# Patient Record
Sex: MALE | Race: WHITE | Employment: OTHER | URBAN - METROPOLITAN AREA
[De-identification: names, ages, dates, MRNs, and addresses within clinical notes are randomized per-mention and may not be internally consistent; named-entity substitution may affect disease eponyms.]

---

## 2017-01-11 ENCOUNTER — OFFICE VISIT (OUTPATIENT)
Dept: CARDIOLOGY CLINIC | Age: 67
End: 2017-01-11

## 2017-01-11 VITALS
DIASTOLIC BLOOD PRESSURE: 66 MMHG | BODY MASS INDEX: 22.76 KG/M2 | HEART RATE: 86 BPM | HEIGHT: 70 IN | SYSTOLIC BLOOD PRESSURE: 138 MMHG | RESPIRATION RATE: 20 BRPM | OXYGEN SATURATION: 97 % | WEIGHT: 159 LBS

## 2017-01-11 DIAGNOSIS — I25.10 CORONARY ARTERY DISEASE INVOLVING NATIVE CORONARY ARTERY OF NATIVE HEART WITHOUT ANGINA PECTORIS: ICD-10-CM

## 2017-01-11 DIAGNOSIS — I48.0 PAF (PAROXYSMAL ATRIAL FIBRILLATION) (HCC): Primary | ICD-10-CM

## 2017-01-11 DIAGNOSIS — E78.5 DYSLIPIDEMIA: ICD-10-CM

## 2017-01-11 NOTE — PATIENT INSTRUCTIONS
[employer] Activation    Thank you for requesting access to [employer]. Please follow the instructions below to securely access and download your online medical record. [employer] allows you to send messages to your doctor, view your test results, renew your prescriptions, schedule appointments, and more. How Do I Sign Up? 1. In your internet browser, go to [email or web address]  2. Click on the First Time User? Click Here link in the Sign In box. You will be redirect to the New Member Sign Up page. 3. Enter your [employer] Access Code exactly as it appears below. You will not need to use this code after youve completed the sign-up process. If you do not sign up before the expiration date, you must request a new code. [employer] Access Code: [email or web address]  Expires: 2017 10:49 AM (This is the date your [employer] access code will )    4. Enter the last four digits of your Social Security Number (xxxx) and Date of Birth (mm/dd/yyyy) as indicated and click Submit. You will be taken to the next sign-up page. 5. Create a [employer] ID. This will be your [employer] login ID and cannot be changed, so think of one that is secure and easy to remember. 6. Create a [employer] password. You can change your password at any time. 7. Enter your Password Reset Question and Answer. This can be used at a later time if you forget your password. 8. Enter your e-mail address. You will receive e-mail notification when new information is available in [address]. 9. Click Sign Up. You can now view and download portions of your medical record. 10. Click the Download Summary menu link to download a portable copy of your medical information. Additional Information    If you have questions, please visit the Frequently Asked Questions section of the [employer] website at [email or web address] Remember, [employer] is NOT to be used for urgent needs. For medical emergencies, dial 911.            Learning About Coronary Artery Disease (CAD)  What is coronary artery disease? Coronary artery disease (CAD) occurs when plaque builds up in the arteries that bring oxygen-rich blood to your heart. Plaque is a fatty substance made of cholesterol, calcium, and other substances in the blood. This process is called hardening of the arteries, or atherosclerosis. What happens when you have coronary artery disease? · Plaque may narrow the coronary arteries. Narrowed arteries cause poor blood flow. This can lead to angina symptoms such as chest pain or discomfort. If blood flow is completely blocked, you could have a heart attack. · You can slow CAD and reduce the risk of future problems by making changes in your lifestyle. These include quitting smoking and eating heart-healthy foods. · Treatments for CAD, along with changes in your lifestyle, can help you live a longer and healthier life. How can you prevent coronary artery disease? · Do not smoke. It may be the best thing you can do to prevent heart disease. If you need help quitting, talk to your doctor about stop-smoking programs and medicines. These can increase your chances of quitting for good. · Be active. Get at least 30 minutes of exercise on most days of the week. Walking is a good choice. You also may want to do other activities, such as running, swimming, cycling, or playing tennis or team sports. · Eat heart-healthy foods. Eat more fruits and vegetables and less foods that contain saturated and trans fats. Limit alcohol, sodium, and sweets. · Stay at a healthy weight. Lose weight if you need to. · Manage other health problems such as diabetes, high blood pressure, and high cholesterol. · Manage stress. Stress can hurt your heart. To keep stress low, talk about your problems and feelings. Don't keep your feelings hidden. · If you have talked about it with your doctor, take a low-dose aspirin every day.  Aspirin can help certain people lower their risk of a heart attack or stroke. But taking aspirin isn't right for everyone, because it can cause serious bleeding. Do not start taking daily aspirin unless your doctor knows about it. How is coronary artery disease treated? · Your doctor will suggest that you make lifestyle changes. For example, your doctor may ask you to eat healthy foods, quit smoking, lose extra weight, and be more active. · You will have to take medicines. · Your doctor may suggest a procedure to open narrowed or blocked arteries. This is called angioplasty. Or your doctor may suggest using healthy blood vessels to create detours around narrowed or blocked arteries. This is called bypass surgery. Follow-up care is a key part of your treatment and safety. Be sure to make and go to all appointments, and call your doctor if you are having problems. It's also a good idea to know your test results and keep a list of the medicines you take. Where can you learn more? Go to http://argentina-hasmukh.info/. Enter [phone] in the search box to learn more about \"Learning About Coronary Artery Disease (CAD). \"  Current as of: January 27, 2016  Content Version: 11.1  © 1748-0085 [employer]. Care instructions adapted under license by [employer] (which disclaims liability or warranty for this information). If you have questions about a medical condition or this instruction, always ask your healthcare professional. [address] any warranty or liability for your use of this information.

## 2017-01-11 NOTE — LETTER
[facility] 
[facility] 
[address], [address] [facility] 
[phone] Date: 1/11/2017 To Whom It May concern: 
 
Jen Greco was seen in our office by Umm Rodriguez.  is cleared to drive and may operate heavy equipment. He has nor restrictions related to driving. Sincerely, Park Galvin LPN

## 2017-01-11 NOTE — PROGRESS NOTES
Subjective:     Problem List  Date Reviewed: 1/11/2017          Codes Class Noted    Dyspepsia ICD-10-CM: R10.13  ICD-9-CM: 536.8  10/27/2015        PAF (paroxysmal atrial fibrillation) ([facility] [address] ICD-10-CM: I48.0  ICD-9-CM: 427.31  3/9/2015        Dyslipidemia ICD-10-CM: E78.5  ICD-9-CM: 272.4  7/25/2011    Overview Addendum 1/6/2016  9:58 AM by Kermit Estrada MD     A. FLP (6/23/11): Tot 150, TG 57, HDL 50, LDL 89 (no Rx). B.  FLP (1/18/12): Tot 154, TG 45, HDL 51, LDL 94 (Zocor 10)  C. FLP (3/6/15): Tot 221, TG 92, HDL 53,  (no Rx). D.  FLP (9/24/15): Tot 123, TG 74, HDL 47, LDL 61 (Lipitor 40). Coronary artery disease ICD-10-CM: I25.10  ICD-9-CM: 414.00  6/22/2011    Overview Addendum 10/2/2015 12:24 PM by Kermit Estrada MD     A. IMI (6/22/11)  B. Cath (6/22/11):  LAD m30; D1 ost90. LCx patent. RCA p30, m100. EF 60% with mild inferior HK. No AVG/MR. C.  PCI mRCA (6/22/11): 3.0x23 Xience. D.  Exercise Cardiolite (10/17/11): Normal perfusion. EF 64%. E.  Cath (3/9/15):  LAD m95; D1 ost90. LCx patent. RCA patent with patent mid stent. EF 50%. No AVG/MR. F.  PCI mLAD (3/9/15): 2.5x23 Xience. G.  Exercise Cardiolite (10/1/15): Normal perfusion, EF 55%. Mr. Rachana Wahl is a 78 y.o. man with the above past medical history, who presents for follow up of his coronary artery disease. Cube Route He is doing very well from a cardiac standpoint. He has been walking a lot and going hunting. The other day he walked about 25 miles while hunting without any significant cardiopulmonary symptoms. He denies any anginal sounding chest pain, chest discomfort, shortness of breath, dyspnea on exertion, orthopnea, paroxysmal nocturnal dyspnea, lower extremity swelling, palpitations, syncope or near syncope. He tells me he is going to see his primary care provider in the near future and will have his lipids checked by her.          History   Smoking Status    Never Smoker Smokeless Tobacco    Never Used       Current Outpatient Prescriptions   Medication Sig Dispense Refill    cyclobenzaprine (FLEXERIL) 5 mg tablet TAKE 1 TABLET BY MOUTH ONCE DAILY AS NEEDED FOR MUSCLE SPASMS 30 Tab 0    atorvastatin (LIPITOR) 40 mg tablet Take 1 Tab by mouth daily. 30 Tab 5    silodosin (RAPAFLO) 8 mg capsule Take 8 mg by mouth every other day.  B.infantis-B.ani-B.long-B.bifi (PROBIOTIC 4X) 10-15 mg TbEC Take  by mouth daily.  co-enzyme Q-10 (CO Q-10) 100 mg capsule Take 100 mg by mouth daily.  albuterol (PROVENTIL HFA, VENTOLIN HFA, PROAIR HFA) 90 mcg/actuation inhaler Take 2 Puffs by inhalation every four (4) hours as needed for Wheezing or Shortness of Breath. 1 Inhaler 2    aspirin 81 mg chewable tablet Take 1 Tab by mouth daily. Objective:     Visit Vitals    /66 (BP 1 Location: Left arm, BP Patient Position: Sitting)    Pulse 86    Resp 20    Ht 5' 10\" (1.778 m)    Wt 159 lb (72.1 kg)    SpO2 97%    BMI 22.81 kg/m2       HEENT Exam:     Normocephalic, atraumatic. EOMI. Oropharynx negative. Neck supple. No lymphadenopathy. Lung Exam:     The patient is not dyspneic. There is no cough. The lungs are clear to percussion. Breath sounds are heard equally in all lung fields. There are no wheezes, rales, rhonchi, or rubs heard on auscultation. Heart Exam:     The rhythm is regular. The PMI is in the 5th intercostal space of the MCL. Apical impulse is normal. S1 is regular. S2 is physiologic. There is no S3, S4 gallop, murmur, click, or rub. Abdomen Exam:     Bowel sounds are normoactive. Abdomen benign. Extremities Exam:     The extremities are atraumatic appearing. There is no clubbing, cyanosis, edema, ulcers, varicose veins, rash, swelling, erythemia noted in the extremities. The neurovascular status is grossly intact with normal distal sensation and pulses.           Vascular Exam:     The radial, brachial, dorsalis pedis, posterior tibial, are equal and strong bilaterally The carotids are equal bilaterally without bruits. EKG: NSR @ 71, no isch. Assessment/Plan:     Mr. Rachana Wahl appears stable from a cardiac standpoint. We are going to stay the course with his current medication regimen, and he is going to follow up with me in one year's time. We discussed diet and exercise. Plan:  1. Continue outpatient medication regimen. 2. Follow up with me in one year's time. 3. Diet and exercise. 4. Call my office, call his primary care physician, or return to the hospital should any concerning symptomatology arise. Mr. Rachana Wahl indicated that he understood this plan and wished to proceed ahead.             Patient Care Team:  Balaji Todd MD as PCP - General (Family Practice)  Alexy Hicks MD (Urology)

## 2017-01-11 NOTE — LETTER
[facility] 
[facility] 
[address], [address] [place] [address] Road 
[phone] Date: 1/11/2017 To Whom It May concern: 
 
Juan Diego Mercer has been seen in our office by [facility].  is able to drive with no restrictions.  
  
 
 
Sincerely, 
 
Sherwin Gimenez LPN

## 2017-03-30 ENCOUNTER — OFFICE VISIT (OUTPATIENT)
Dept: FAMILY MEDICINE CLINIC | Age: 67
End: 2017-03-30

## 2017-03-30 VITALS
RESPIRATION RATE: 16 BRPM | WEIGHT: 156.6 LBS | HEART RATE: 78 BPM | SYSTOLIC BLOOD PRESSURE: 140 MMHG | OXYGEN SATURATION: 99 % | TEMPERATURE: 98 F | BODY MASS INDEX: 22.42 KG/M2 | HEIGHT: 70 IN | DIASTOLIC BLOOD PRESSURE: 81 MMHG

## 2017-03-30 DIAGNOSIS — Z23 ENCOUNTER FOR IMMUNIZATION: ICD-10-CM

## 2017-03-30 DIAGNOSIS — Z12.11 SCREENING FOR MALIGNANT NEOPLASM OF COLON: ICD-10-CM

## 2017-03-30 DIAGNOSIS — Z13.39 SCREENING FOR ALCOHOLISM: ICD-10-CM

## 2017-03-30 DIAGNOSIS — Z00.00 MEDICARE ANNUAL WELLNESS VISIT, INITIAL: Primary | ICD-10-CM

## 2017-03-30 DIAGNOSIS — R53.83 FATIGUE, UNSPECIFIED TYPE: ICD-10-CM

## 2017-03-30 DIAGNOSIS — Z11.59 NEED FOR HEPATITIS C SCREENING TEST: ICD-10-CM

## 2017-03-30 DIAGNOSIS — E78.5 DYSLIPIDEMIA: ICD-10-CM

## 2017-03-30 DIAGNOSIS — Z13.31 SCREENING FOR DEPRESSION: ICD-10-CM

## 2017-03-30 RX ORDER — BISMUTH SUBSALICYLATE 262 MG
1 TABLET,CHEWABLE ORAL DAILY
COMMUNITY
End: 2018-07-10

## 2017-03-30 NOTE — PROGRESS NOTES
Date of visit: 3/30/2017     This is an Initial Medicare Annual Wellness Exam (AWV) (Performed 12 months after IPPE or effective date of Medicare Part B enrollment, Once in a lifetime)    I have reviewed the patient's medical history in detail and updated the computerized patient record. Bob Reed is a 78 y.o. male   History obtained from: spouse and the patient. Concerns today   Hyperlipidemia:   - Significant other reports that he has muscle weakness and wasting which she attributes to his Lipitor. Reports muscle cramping and aching in the calves especially at night. Has discussed with Cardiologist and he needs to be on therapy. He is currently taking a multivitamin that is geared towards those taking statin therapy. - Also with fatigue and decreased libido. Interested in having his testosterone checked. - Due for labs but he is not fasting at this time. Will return. History     Patient Active Problem List   Diagnosis Code    Coronary artery disease I25.10    Dyslipidemia E78.5    PAF (paroxysmal atrial fibrillation) ([facility]) I48.0    Dyspepsia R10.13       Past Medical History:   Diagnosis Date    Atrial fibrillation ([facility] [address] 3/9/2015    Back problem     Dyslipidemia 7/25/2011    Embedded metal fragments     chest    MI (myocardial infarction) ([facility] [address]         Past Surgical History:   Procedure Laterality Date    HX CORONARY STENT PLACEMENT      STRESS TEST CARDIOLITE         No Known Allergies       Current Outpatient Prescriptions   Medication Sig Dispense Refill    multivitamin (ONE A DAY) tablet Take 1 Tab by mouth daily.  cyclobenzaprine (FLEXERIL) 5 mg tablet TAKE 1 TABLET BY MOUTH ONCE DAILY AS NEEDED FOR MUSCLE SPASMS 30 Tab 0    atorvastatin (LIPITOR) 40 mg tablet Take 1 Tab by mouth daily. 30 Tab 5    silodosin (RAPAFLO) 8 mg capsule Take 8 mg by mouth every other day.  B.infantis-B.ani-B.long-B.bifi (PROBIOTIC 4X) 10-15 mg TbEC Take  by mouth daily.       co-enzyme Q-10 (CO Q-10) 100 mg capsule Take 100 mg by mouth daily.  albuterol (PROVENTIL HFA, VENTOLIN HFA, PROAIR HFA) 90 mcg/actuation inhaler Take 2 Puffs by inhalation every four (4) hours as needed for Wheezing or Shortness of Breath. 1 Inhaler 2    aspirin 81 mg chewable tablet Take 1 Tab by mouth daily. Family History   Problem Relation Age of Onset    Heart Disease Mother     Heart Disease Father     Heart Disease Sister     Hypertension Sister     Heart Disease Brother     Asthma Brother      Social History   Substance Use Topics    Smoking status: Never Smoker    Smokeless tobacco: Never Used    Alcohol use No       Specialists/Care Team   Jovi Hopkins has established care with the following healthcare providers:  Patient Care Team:  Mariely Barney MD as PCP - General (Family Practice)  Madison Ennis MD (Urology)  Nancy Rutherford MD as Physician (Cardiology)    Health Risk Assessment     Demographics   male  78 y.o. General Health Questions   -During the past 4 weeks:   -how would you rate your health in general? Good   -how often have you been bothered by feeling dizzy when standing up? never   -how much have you been bothered by bodily pain? \"not unless we go out and over do it\"    -Have you noticed any hearing difficulties? no   -has your physical and emotional health limited your social activities with family or friends? no    Emotional Health Questions     PHQ 2 / 9, over the last two weeks 3/30/2017   Little interest or pleasure in doing things Not at all   Feeling down, depressed or hopeless Not at all   Total Score PHQ 2 0     Health Habits   · Please describe your diet habits: well balanced   · Do you get 5 servings of fruits or vegetables daily? yes  · Do you exercise regularly?  yes    Hearing Loss   · none    Activities of Daily Living and Functional Status   · Do you need help with eating, walking, dressing, bathing, toileting, the phone, transportation, shopping, preparing meals, housework, laundry, medications or managing money? no  · In the past four weeks, was someone available to help you if you needed and wanted help with anything? yes  · Are you confident are you that you can control and manage most of your health problems? yes  · Have you been given information to help you keep track of your medications? yes  · How often do you have trouble taking your medications as prescribed? never     ADL Assessment 3/30/2017   Feeding yourself No Help Needed   Getting from bed to chair No Help Needed   Getting dressed No Help Needed   Bathing or showering No Help Needed   Walk across the room (includes cane/walker) No Help Needed   Using the telphone No Help Needed   Taking your medications No Help Needed   Preparing meals No Help Needed   Managing money (expenses/bills) No Help Needed   Moderately strenuous housework (laundry) No Help Needed   Shopping for personal items (toiletries/medicines) No Help Needed   Shopping for groceries No Help Needed   Driving No Help Needed   Climbing a flight of stairs No Help Needed   Getting to places beyond walking distances No Help Needed       Fall Risk and Home Safety     Fall Risk Assessment, last 12 mths 3/30/2017   Able to walk? Yes   Fall in past 12 months? No     · Does your home have rugs in the hallway, lack grab bars in the bathroom, lack handrails on the stairs or have poor lighting? no  · Do you have smoke detectors and check them regularly? yes  · Do you have difficulties driving a car? no  · Do you always fasten your seat belt when you are in a car? yes     Abuse Screen   · Patient is not abused    Alcohol Risk Factor Screening: On any occasion during the past 3 months, have you had more than 4 drinks containing alcohol? No  · Do you average more than 14 drinks per week?   No    Review of Systems (if indicated for problems addressed today)   None    Physical Examination     Vitals:    03/30/17 1007   BP: 140/81   Pulse: 78 Resp: 16   Temp: 98 °F (36.7 °C)   TempSrc: Oral   SpO2: 99%   Weight: 156 lb 9.6 oz (71 kg)   Height: 5' 10\" (1.778 m)     Body mass index is 22.47 kg/(m^2). Was the patient's timed Up & Go test unsteady or longer than 30 seconds? No    General appearance - alert, well appearing, and in no distress  Eyes - pupils equal and reactive, extraocular eye movements intact, sclera anicteric  Mouth - mucous membranes moist, pharynx normal without lesions  Chest - clear to auscultation, no wheezes, rales or rhonchi, symmetric air entry  Heart - normal rate, regular rhythm, normal S1, S2, no murmurs, rubs, clicks or gallops  Skin - normal coloration and turgor    Evaluation of Cognitive Function   Mood/affect:  happy  Orientation: Person, Place, Time and Situation  Appearance: age appropriate, well dressed and within normal Limits  Family member/caregiver input: None      Advice/Referrals/Counseling (as indicated)   Education and counseling provided:  Pneumococcal Vaccine  Colorectal cancer screening tests  Hepatitis C screening    Preventive Services     (Preventive care checklist to be included in patient instructions)  Discussed today Done Previously Not Needed    x   Pneumococcal vaccines    x  Flu vaccine     x Hepatitis B vaccine (if at risk)    x  Shingles vaccine    x  TDAP vaccine   x   Digital rectal exam   x   PSA   x   Colorectal cancer screening     x Low-dose CT for lung cancer screening     x Bone density test    x  Glaucoma screening     x  Cholesterol test    x  Diabetes screening test      x Diabetes self-management class     x Nutritionist referral for diabetes or renal disease     Discussion of Advance Directive   Discussed with Annelise Lopez his ability to prepare and advance directive in the case that an injury or illness causes him to be unable to make health care decisions. ACP document reviewed and no changes; document is on file.      Assessment/Plan   Annelise Lopez is a 78 y.o. male seen today for Medicare Annual Wellness Visit. 1. Medicare annual wellness visit, initial: ACP document on file reviewed and no changes. 2. Screening for alcoholism: negative. 3. Screening for depression: negative. - Depression Screen Annual    4. Need for hepatitis C screening test: will screen based upon his birth year of 1950.   - HEPATITIS C AB    5. Encounter for immunization: PPSV-23 administered, VIS provided. - Pneumococcal Admin ()  - Pneumococcal Polysaccharide Vaccine    6. Dyslipidemia: he is due for fasting labs which he will return to have done. Continue with statin therapy. - LIPID PANEL  - METABOLIC PANEL, COMPREHENSIVE    7. Fatigue, unspecified type: check labs as below.   - TSH RFX ON ABNORMAL TO FREE T4  - TESTOSTERONE, TOTAL, ADULT MALE    8. Screening for malignant neoplasm of colon: referral to GI for screening colonoscopy provided. Encouraged to schedule appointment. - Referral to Gastroenterology    Follow-up Disposition:  Return in about 1 year (around 3/30/2018) for annual Medicare Wellness Visit or sooner as needed.       Jad Islas MD

## 2017-03-30 NOTE — PATIENT INSTRUCTIONS
The best way to stay healthy is to live a healthy lifestyle. A healthy lifestyle includes regular exercise, eating a well-balanced diet, keeping a healthy weight and not smoking. Regular physical exams and screening tests are another important way to take care of yourself. Preventive exams provided by health care providers can find health problems early when treatment works best and can keep you from getting certain diseases or illnesses. Preventive services include exams, lab tests, screenings, shots, monitoring and information to help you take care of your own health. All people over 65 should have a pneumonia shot. Pneumonia shots are usually only needed once in a lifetime unless your doctor decides differently. In addition to your physical exam, some screening tests are recommended:    All people over 65 should have a yearly flu shot. People over 65 are at medium to high risk for Hepatitis B. Three shots are needed for complete protection. Bone mass measurement (dexa scan) is recommended every two years. Diabetes Mellitus screening is recommended every year. Glaucoma is an eye disease caused by high pressure in the eye. An eye exam is recommended every year. Cardiovascular screening tests that check your cholesterol and other blood fat (lipid) levels are recommended every five years. Colorectal Cancer screening tests help to find pre-cancerous polyps (growths in the colon) so they can be removed before they turn into cancer. Tests ordered for screening depend on your personal and family history risk factors. Prostate Cancer Screening (annually up to age 76)    Screening for breast cancer is recommended yearly with a Mammogram.    Screening for cervical and vaginal cancer is recommended with a pelvic and Pap test every two years.  However if you have had an abnormal pap in the past  three years or at high risk for cervical or vaginal cancer Medicare will cover a pap test and a pelvic exam every year. Here is a list of your current Health Maintenance items with a due date:  Health Maintenance Due   Topic Date Due    Hepatitis C Test  1950   Lab has been ordered     Colonoscopy  11/01/1967    Pneumococcal Vaccine (2 of 2 - PPSV23) 08/19/2016  Done today 3/30/17     Annual Well Visit  01/12/2017  Done today 3/30/17              Pneumococcal Polysaccharide Vaccine: What You Need to Know  Why get vaccinated? Vaccination can protect older adults (and some children and younger adults) from pneumococcal disease. Pneumococcal disease is caused by bacteria that can spread from person to person through close contact. It can cause ear infections, and it can also lead to more serious infections of the:  · Lungs (pneumonia),  · Blood (bacteremia), and  · Covering of the brain and spinal cord (meningitis). Meningitis can cause deafness and brain damage, and it can be fatal.  Anyone can get pneumococcal disease, but children under 3years of age, people with certain medical conditions, adults over 72years of age, and cigarette smokers are at the highest risk. About 18,000 older adults die each year from pneumococcal disease in the [place]. Treatment of pneumococcal infections with penicillin and other drugs used to be more effective. But some strains of the disease have become resistant to these drugs. This makes prevention of the disease, through vaccination, even more important. Pneumococcal polysaccharide vaccine (PPSV23)  Pneumococcal polysaccharide vaccine (PPSV23) protects against 23 types of pneumococcal bacteria. It will not prevent all pneumococcal disease. PPSV23 is recommended for:  · All adults 72years of age and older,  · Anyone 2 through 59years of age with certain long-term health problems,  · Anyone 2 through 59years of age with a weakened immune system,  · Adults 23 through 59years of age who smoke cigarettes or have asthma.   Most people need only one dose of PPSV. A second dose is recommended for certain high-risk groups. People 72 and older should get a dose even if they have gotten one or more doses of the vaccine before they turned 65. Your healthcare provider can give you more information about these recommendations. Most healthy adults develop protection within 2 to 3 weeks of getting the shot. Some people should not get this vaccine  · Anyone who has had a life-threatening allergic reaction to PPSV should not get another dose. · Anyone who has a severe allergy to any component of PPSV should not receive it. Tell your provider if you have any severe allergies. · Anyone who is moderately or severely ill when the shot is scheduled may be asked to wait until they recover before getting the vaccine. Someone with a mild illness can usually be vaccinated. · Children less than 3years of age should not receive this vaccine. · There is no evidence that PPSV is harmful to either a pregnant woman or to her fetus. However, as a precaution, women who need the vaccine should be vaccinated before becoming pregnant, if possible. Risks of a vaccine reaction  With any medicine, including vaccines, there is a chance of side effects. These are usually mild and go away on their own, but serious reactions are also possible. About half of people who get PPSV have mild side effects, such as redness or pain where the shot is given, which go away within about two days. Less than 1 out of 100 people develop a fever, muscle aches, or more severe local reactions. Problems that could happen after any vaccine:  · People sometimes faint after a medical procedure, including vaccination. Sitting or lying down for about 15 minutes can help prevent fainting, and injuries caused by a fall. Tell your doctor if you feel dizzy, or have vision changes or ringing in the ears. · Some people get severe pain in the shoulder and have difficulty moving the arm where a shot was given.  This happens very rarely. · Any medication can cause a severe allergic reaction. Such reactions from a vaccine are very rare, estimated at about 1 in a million doses, and would happen within a few minutes to a few hours after the vaccination. As with any medicine, there is a very remote chance of a vaccine causing a serious injury or death. The safety of vaccines is always being monitored. For more information, visit: www.cdc.gov/vaccinesafety/  What if there is a serious reaction? What should I look for? Look for anything that concerns you, such as signs of a severe allergic reaction, very high fever, or unusual behavior. Signs of a severe allergic reaction can include hives, swelling of the face and throat, difficulty breathing, a fast heartbeat, dizziness, and weakness. These would usually start a few minutes to a few hours after the vaccination. What should I do? If you think it is a severe allergic reaction or other emergency that can't wait, call 9-1-1 or get to the nearest hospital. Otherwise, call your doctor. Afterward, the reaction should be reported to the Vaccine Adverse Event Reporting System (VAERS). Your doctor might file this report, or you can do it yourself through the VAERS web site at www.vaers. [facility].gov, or by calling 2-289.572.2280. [facility] does not give medical advice. How can I learn more? · Ask your doctor. He or she can give you the vaccine package insert or suggest other sources of information. · Call your local or state health department. · Contact the Centers for Disease Control and Prevention (CDC):  ¨ Call 6-559.287.1818 (1-800-CDC-INFO) or  ¨ Visit CDC's website at www.cdc.gov/vaccines  Vaccine Information Statement  PPSV Vaccine  (04/24/2015)  Department of Health and Human Services  Centers for Disease Control and Prevention  Many Vaccine Information Statements are available in Kiswahili and other languages. See www.immunize.org/vis.   [address] disponibles en español y en muchos otros idiomas. [employer]ite [place].si. Care instructions adapted under license by your healthcare professional. If you have questions about a medical condition or this instruction, always ask your healthcare professional. [address] any warranty or liability for your use of this information. A Healthy Lifestyle: Care Instructions  Your Care Instructions  A healthy lifestyle can help you feel good, stay at a healthy weight, and have plenty of energy for both work and play. A healthy lifestyle is something you can share with your whole family. A healthy lifestyle also can lower your risk for serious health problems, such as high blood pressure, heart disease, and diabetes. You can follow a few steps listed below to improve your health and the health of your family. Follow-up care is a key part of your treatment and safety. Be sure to make and go to all appointments, and call your doctor if you are having problems. Its also a good idea to know your test results and keep a list of the medicines you take. How can you care for yourself at home? · Do not eat too much sugar, fat, or fast foods. You can still have dessert and treats now and then. The goal is moderation. · Start small to improve your eating habits. Pay attention to portion sizes, drink less juice and soda pop, and eat more fruits and vegetables. ¨ Eat a healthy amount of food. A 3-ounce serving of meat, for example, is about the size of a deck of cards. Fill the rest of your plate with vegetables and whole grains. ¨ Limit the amount of soda and sports drinks you have every day. Drink more water when you are thirsty. ¨ Eat at least 5 servings of fruits and vegetables every day. It may seem like a lot, but it is not hard to reach this goal. A serving or helping is 1 piece of fruit, 1 cup of vegetables, or 2 cups of leafy, raw vegetables.  Have an apple or some carrot sticks as an afternoon snack instead of a candy bar. Try to have fruits and/or vegetables at every meal.  · Make exercise part of your daily routine. You may want to start with simple activities, such as walking, bicycling, or slow swimming. Try to be active 30 to 60 minutes every day. You do not need to do all 30 to 60 minutes all at once. For example, you can exercise 3 times a day for 10 or 20 minutes. Moderate exercise is safe for most people, but it is always a good idea to talk to your doctor before starting an exercise program.  · Keep moving. Suhail Leap the lawn, work in the garden, or [employer]. Take the stairs instead of the elevator at work. · If you smoke, quit. People who smoke have an increased risk for heart attack, stroke, cancer, and other lung illnesses. Quitting is hard, but there are ways to boost your chance of quitting tobacco for good. ¨ Use nicotine gum, patches, or lozenges. ¨ Ask your doctor about stop-smoking programs and medicines. ¨ Keep trying. In addition to reducing your risk of diseases in the future, you will notice some benefits soon after you stop using tobacco. If you have shortness of breath or asthma symptoms, they will likely get better within a few weeks after you quit. · Limit how much alcohol you drink. Moderate amounts of alcohol (up to 2 drinks a day for men, 1 drink a day for women) are okay. But drinking too much can lead to liver problems, high blood pressure, and other health problems. Family health  If you have a family, there are many things you can do together to improve your health. · Eat meals together as a family as often as possible. · Eat healthy foods. This includes fruits, vegetables, lean meats and dairy, and whole grains. · Include your family in your fitness plan. Most people think of activities such as jogging or tennis as the way to fitness, but there are many ways you and your family can be more active.  Anything that makes you breathe hard and gets your heart pumping is exercise. Here are some tips:  ¨ Walk to do errands or to take your child to school or the bus. ¨ Go for a family bike ride after dinner instead of watching TV. Where can you learn more? Go to http://argentina-hasmukh.info/. Enter U882 in the search box to learn more about \"A Healthy Lifestyle: Care Instructions. \"  Current as of: July 26, 2016  Content Version: 11.2  © 7996-1146 [employer]. Care instructions adapted under license by [employer] (which disclaims liability or warranty for this information). If you have questions about a medical condition or this instruction, always ask your healthcare professional. [address] any warranty or liability for your use of this information.

## 2017-03-30 NOTE — ACP (ADVANCE CARE PLANNING)
Advance Care Planning (ACP) Provider Conversation Snapshot    Date of ACP Conversation: 03/30/17  Persons included in Conversation:  patient and POA  Length of ACP Conversation in minutes:  <16 minutes (Non-Billable)    Authorized Decision Maker (if patient is incapable of making informed decisions): This person is:   Healthcare Agent/Medical Power of  under Advance Directive - [place]           For Patients with Decision Making Capacity:   Values/Goals: Exploration of values, goals, and preferences if recovery is not expected, even with continued medical treatment in the event of:  Imminent death  Severe, permanent brain injury  \"In these circumstances, what matters most to you? \"  Care focused more on comfort or quality of life.     Conversation Outcomes / Follow-Up Plan:   Reviewed existing Advance Directive   Recommended review of completed ACP document annually or upon change in health status

## 2017-03-30 NOTE — PROGRESS NOTES
Chief Complaint   Patient presents with   [facility] Annual Wellness Visit     \"REVIEWED RECORD IN PREPARATION FOR VISIT AND HAVE OBTAINED THE NECESSARY DOCUMENTATION\"  1. Have you been to the ER, urgent care clinic since your last visit? Hospitalized since your last visit? No    2. Have you seen or consulted any other health care providers outside of the [address] [place] [address] since your last visit? Include any pap smears or colon screening.  Patient does have a living will and advanced directives

## 2017-04-01 LAB
ALBUMIN SERPL-MCNC: 4.7 G/DL (ref 3.6–4.8)
ALBUMIN/GLOB SERPL: 2.1 {RATIO} (ref 1.2–2.2)
ALP SERPL-CCNC: 66 IU/L (ref 39–117)
ALT SERPL-CCNC: 23 IU/L (ref 0–44)
AST SERPL-CCNC: 20 IU/L (ref 0–40)
BILIRUB SERPL-MCNC: 0.5 MG/DL (ref 0–1.2)
BUN SERPL-MCNC: 17 MG/DL (ref 8–27)
BUN/CREAT SERPL: 16 (ref 10–22)
CALCIUM SERPL-MCNC: 9.5 MG/DL (ref 8.6–10.2)
CHLORIDE SERPL-SCNC: 98 MMOL/L (ref 96–106)
CHOLEST SERPL-MCNC: 192 MG/DL (ref 100–199)
CO2 SERPL-SCNC: 26 MMOL/L (ref 18–29)
COMMENT: NORMAL
CREAT SERPL-MCNC: 1.07 MG/DL (ref 0.76–1.27)
GLOBULIN SER CALC-MCNC: 2.2 G/DL (ref 1.5–4.5)
GLUCOSE SERPL-MCNC: 101 MG/DL (ref 65–99)
HCV AB S/CO SERPL IA: <0.1 S/CO RATIO (ref 0–0.9)
HDLC SERPL-MCNC: 63 MG/DL
INTERPRETATION, 910389: NORMAL
LDLC SERPL CALC-MCNC: 114 MG/DL (ref 0–99)
POTASSIUM SERPL-SCNC: 4.7 MMOL/L (ref 3.5–5.2)
PROT SERPL-MCNC: 6.9 G/DL (ref 6–8.5)
SODIUM SERPL-SCNC: 142 MMOL/L (ref 134–144)
TESTOST SERPL-MCNC: 570 NG/DL (ref 348–1197)
TRIGL SERPL-MCNC: 76 MG/DL (ref 0–149)
TSH SERPL DL<=0.005 MIU/L-ACNC: 3.78 UIU/ML (ref 0.45–4.5)
VLDLC SERPL CALC-MCNC: 15 MG/DL (ref 5–40)

## 2017-04-07 ENCOUNTER — TELEPHONE (OUTPATIENT)
Dept: FAMILY MEDICINE CLINIC | Age: 67
End: 2017-04-07

## 2017-04-12 NOTE — TELEPHONE ENCOUNTER
Patient called and VM message left asking that he return my phone call. Labs:   - Testosterone level is normal.   - Thyroid function is normal.   - Electrolytes including kidney and liver function normal. Mildly elevated blood glucose level at 101. - Lipid panel notable for mild increase in LDL level from previous (61 --> 114). He should continue with his statin therapy. - Hepatitis C screening is negative.

## 2017-04-26 ENCOUNTER — HOSPITAL ENCOUNTER (OUTPATIENT)
Dept: GENERAL RADIOLOGY | Age: 67
Discharge: HOME | End: 2017-04-26
Attending: PODIATRIST
Payer: MEDICARE

## 2017-04-26 DIAGNOSIS — M20.41 ACQUIRED HAMMER TOE OF RIGHT FOOT: ICD-10-CM

## 2017-04-26 DIAGNOSIS — M20.42 HAMMER TOE OF LEFT FOOT: ICD-10-CM

## 2017-04-26 PROCEDURE — 73630 X-RAY EXAM OF FOOT: CPT

## 2017-04-28 NOTE — H&P
.     History and Physical    Patient: Baljinder Claire MRN: [id number]  SSN: [id number]    YOB: 1950  Age: 78 y.o. Sex: male      Subjective:      Baljinder Claire is a 78 y.o. male with a  Past medical history significant for atrial fibrillation and myocardial infarctions, comes to my private office with a chief complain of pain and discomfort to the right 4th interspace. Patient has has lesion inbetween his 4th interspace for approximatly 5 years. Patient has tried all conservative method to relieve the pain however with minimal to no improvement. Patient now has an ulcer between his digits from the extensive rubbing and removal of the corn. Past Medical History:   Diagnosis Date    Atrial fibrillation ([facility] [address] 3/9/2015    Back problem     Dyslipidemia 7/25/2011    Embedded metal fragments     chest    MI (myocardial infarction) ([facility] [address]      Past Surgical History:   Procedure Laterality Date    HX CORONARY STENT PLACEMENT      STRESS TEST CARDIOLITE        Family History   Problem Relation Age of Onset    Heart Disease Mother     Heart Disease Father     Heart Disease Sister     Hypertension Sister     Heart Disease Brother     Asthma Brother      Social History   Substance Use Topics    Smoking status: Never Smoker    Smokeless tobacco: Never Used    Alcohol use No      Prior to Admission medications    Medication Sig Start Date End Date Taking? Authorizing Provider   multivitamin (ONE A DAY) tablet Take 1 Tab by mouth daily. Historical Provider   cyclobenzaprine (FLEXERIL) 5 mg tablet TAKE 1 TABLET BY MOUTH ONCE DAILY AS NEEDED FOR MUSCLE SPASMS 12/14/16   Casimiro Pompa MD   atorvastatin (LIPITOR) 40 mg tablet Take 1 Tab by mouth daily. 11/10/16   Rachel Krishnan MD   silodosin (RAPAFLO) 8 mg capsule Take 8 mg by mouth every other day. Historical Provider   B.infantis-B.ani-B.long-B.bifi (PROBIOTIC 4X) 10-15 mg TbEC Take  by mouth daily.     Historical Provider   co-enzyme Q-10 (CO Q-10) 100 mg capsule Take 100 mg by mouth daily. Historical Provider   albuterol (PROVENTIL HFA, VENTOLIN HFA, PROAIR HFA) 90 mcg/actuation inhaler Take 2 Puffs by inhalation every four (4) hours as needed for Wheezing or Shortness of Breath. 10/27/15   Lencho Stephens MD   aspirin 81 mg chewable tablet Take 1 Tab by mouth daily. 3/10/15   Danna Kearney MD        No Known Allergies    Review of Systems:  A comprehensive review of systems was negative except for that written in the History of Present Illness. Objective:       Physical Exam:  GENERAL: alert, cooperative, no distress  LYMPHATIC: Cervical, supraclavicular, and axillary nodes normal.   THROAT & NECK: normal and no erythema or exudates noted. LUNG: clear to auscultation bilaterally  HEART: regular rate and rhythm, S1, S2 normal, no murmur, click, rub or gallop  ABDOMEN: soft, non-tender. Bowel sounds normal. No masses,  no organomegaly  EXTREMITIES:  extremities normal, atraumatic, no cyanosis or edema  SKIN: lesion noted on both toes in the left and right 4th interspaces, right worse than left, macerationed hyperkeratotic lesion with thick core. PSYCHIATRIC: non focal    Assessment:     Hospital Problems  Date Reviewed: 3/30/2017    None          Plan:     78year old male with a PMH significant for afib and MI's, has a painful listers corn on his right foot which has progressed to ulcerations    1. Pt examined and evaluated  2. All conservative measures including padding, strapping, wider shoe gear have been exhausted. Patient understands if nothing is done lesion can progress to a bone infection. 3.  Pt scheduled for surgery next Wednesday for right 5th digit arthroplasty and right 4th metatarsal and proximal phalanx exostectomy. 4. All alternatives risks and complications discussed with patient.     5. P    Signed By: Mandy Betancourt DPM     April 28, 2017

## 2017-05-01 ENCOUNTER — HOSPITAL ENCOUNTER (OUTPATIENT)
Dept: PREADMISSION TESTING | Age: 67
Discharge: HOME | End: 2017-05-01

## 2017-05-01 VITALS
WEIGHT: 154.3 LBS | SYSTOLIC BLOOD PRESSURE: 150 MMHG | BODY MASS INDEX: 22.09 KG/M2 | TEMPERATURE: 98.5 F | RESPIRATION RATE: 16 BRPM | DIASTOLIC BLOOD PRESSURE: 75 MMHG | HEART RATE: 68 BPM | OXYGEN SATURATION: 98 % | HEIGHT: 70 IN

## 2017-05-01 RX ORDER — ACETAMINOPHEN 160 MG/5ML
200 SUSPENSION, ORAL (FINAL DOSE FORM) ORAL DAILY
COMMUNITY
End: 2018-07-10

## 2017-05-01 NOTE — PERIOP NOTES
[facility]  PREOPERATIVE INSTRUCTIONS    Surgery Date:  5/3/2017  Surgery arrival time given by surgeon: NO   If no,[facility] staff will call you between 4 PM- 8 PM the day before surgery with your arrival time. If your surgery is on a Monday, we will call you the preceding Friday. Please call [phone] after 8 PM if you did not receive your arrival time. 1. Please report at the designated time to the 2[address] [facility]. Bring your insurance card, photo identification, and any copayment ( if applicable). 2. You must have a responsible adult to drive you home. You need to have a responsible adult to stay with you the first 24 hours after surgery if you are going home the same day of your surgery and you should not drive a car for 24 hours following your surgery. 3. Nothing to eat or drink after midnight the night before surgery. This includes no water, gum, mints, coffee, juice, etc.  Please note special instructions, if applicable, below for medications. 4. MEDICATIONS TO TAKE THE MORNING OF SURGERY WITH A SIP OF WATER: None  5. You MAY take your pain medication the day of surgery with a sip of water. 6. No alcoholic beverages 24 hours before or after your surgery. 7. If you are being admitted to the hospital,please leave personal belongings/luggage in your car until you have an assigned hospital room number. 8. Stop Aspirin and/or any non-steroidal anti-inflammatory drugs (i.e. Ibuprofen, Naproxen, Advil, Aleve) as directed by your prescribing physician. You may take Tylenol. Stop herbal supplements 1 week prior to  surgery. 9. If you are currently taking Plavix, Coumadin,or any other blood-thinning/anticoagulant medication contact your prescribing physician for instructions. 10. Please wear comfortable clothes. Wear your glasses instead of contacts. We ask that all money, jewelry and valuables be left at home. Wear no make up, particularly mascara, the day of surgery.    11.  All body piercings, rings,and jewelry need to be removed and left at home. Please wear your hair loose or down. Please no pony-tails, buns, or any metal hair accessories. If you shower the morning of surgery, please do not apply any lotions, powders, or deodorants afterwards. Do not shave any body area within 24 hours of your surgery. 12. Please follow all instructions to avoid any potential surgical cancellation. 13. Should your physical condition change, (i.e. fever, cold, flu, etc.) please notify your surgeon as soon as possible. 14. It is important to be on time. If a situation occurs where you may be delayed, please call:  [phone] on the day of surgery. 15. The Preadmission Testing staff can be reached at [phone]. Aurelia [address] 16. Special instructions:   1. Please bring your completed medication sheet with you the day of surgery. Please update any changes in medications. 2. Free  parking  The patient was contacted  in person. He  demonstrate  understanding of all instructions does not  need reinforcement.

## 2017-05-03 ENCOUNTER — HOSPITAL ENCOUNTER (OUTPATIENT)
Age: 67
Setting detail: OUTPATIENT SURGERY
Discharge: HOME | End: 2017-05-03
Attending: PODIATRIST | Admitting: PODIATRIST
Payer: MEDICARE

## 2017-05-03 ENCOUNTER — APPOINTMENT (OUTPATIENT)
Dept: GENERAL RADIOLOGY | Age: 67
End: 2017-05-03
Attending: PODIATRIST
Payer: MEDICARE

## 2017-05-03 ENCOUNTER — ANESTHESIA (OUTPATIENT)
Dept: SURGERY | Age: 67
End: 2017-05-03
Payer: MEDICARE

## 2017-05-03 ENCOUNTER — ANESTHESIA EVENT (OUTPATIENT)
Dept: SURGERY | Age: 67
End: 2017-05-03
Payer: MEDICARE

## 2017-05-03 VITALS
SYSTOLIC BLOOD PRESSURE: 160 MMHG | HEART RATE: 61 BPM | TEMPERATURE: 97.5 F | OXYGEN SATURATION: 100 % | RESPIRATION RATE: 14 BRPM | DIASTOLIC BLOOD PRESSURE: 90 MMHG

## 2017-05-03 PROCEDURE — 76010000149 HC OR TIME 1 TO 1.5 HR: Performed by: PODIATRIST

## 2017-05-03 PROCEDURE — 77030006773 HC BLD SAW OSC BRSM -A: Performed by: PODIATRIST

## 2017-05-03 PROCEDURE — 74011250636 HC RX REV CODE- 250/636

## 2017-05-03 PROCEDURE — 77030002986 HC SUT PROL J&J -A: Performed by: PODIATRIST

## 2017-05-03 PROCEDURE — 74011000250 HC RX REV CODE- 250: Performed by: PODIATRIST

## 2017-05-03 PROCEDURE — 77030036687 HC SHOE PSTOP S2SG -A

## 2017-05-03 PROCEDURE — 74011250636 HC RX REV CODE- 250/636: Performed by: PODIATRIST

## 2017-05-03 PROCEDURE — 77030018836 HC SOL IRR NACL ICUM -A: Performed by: PODIATRIST

## 2017-05-03 PROCEDURE — 77030002933 HC SUT MCRYL J&J -A: Performed by: PODIATRIST

## 2017-05-03 PROCEDURE — 73620 X-RAY EXAM OF FOOT: CPT

## 2017-05-03 PROCEDURE — 76060000033 HC ANESTHESIA 1 TO 1.5 HR: Performed by: PODIATRIST

## 2017-05-03 PROCEDURE — 74011000250 HC RX REV CODE- 250

## 2017-05-03 PROCEDURE — 76210000026 HC REC RM PH II 1 TO 1.5 HR: Performed by: PODIATRIST

## 2017-05-03 PROCEDURE — 77030020782 HC GWN BAIR PAWS FLX 3M -B

## 2017-05-03 RX ORDER — SODIUM CHLORIDE, SODIUM LACTATE, POTASSIUM CHLORIDE, CALCIUM CHLORIDE 600; 310; 30; 20 MG/100ML; MG/100ML; MG/100ML; MG/100ML
INJECTION, SOLUTION INTRAVENOUS
Status: DISCONTINUED | OUTPATIENT
Start: 2017-05-03 | End: 2017-05-03 | Stop reason: HOSPADM

## 2017-05-03 RX ORDER — BUPIVACAINE HYDROCHLORIDE 5 MG/ML
INJECTION, SOLUTION EPIDURAL; INTRACAUDAL AS NEEDED
Status: DISCONTINUED | OUTPATIENT
Start: 2017-05-03 | End: 2017-05-03 | Stop reason: HOSPADM

## 2017-05-03 RX ORDER — GLYCOPYRROLATE 0.2 MG/ML
INJECTION INTRAMUSCULAR; INTRAVENOUS AS NEEDED
Status: DISCONTINUED | OUTPATIENT
Start: 2017-05-03 | End: 2017-05-03 | Stop reason: HOSPADM

## 2017-05-03 RX ORDER — PROPOFOL 10 MG/ML
INJECTION, EMULSION INTRAVENOUS
Status: DISCONTINUED | OUTPATIENT
Start: 2017-05-03 | End: 2017-05-03 | Stop reason: HOSPADM

## 2017-05-03 RX ORDER — SODIUM CHLORIDE 0.9 % (FLUSH) 0.9 %
5-10 SYRINGE (ML) INJECTION EVERY 8 HOURS
Status: DISCONTINUED | OUTPATIENT
Start: 2017-05-03 | End: 2017-05-03 | Stop reason: HOSPADM

## 2017-05-03 RX ORDER — HYDROMORPHONE HYDROCHLORIDE 1 MG/ML
1 INJECTION, SOLUTION INTRAMUSCULAR; INTRAVENOUS; SUBCUTANEOUS
Status: DISCONTINUED | OUTPATIENT
Start: 2017-05-03 | End: 2017-05-03 | Stop reason: HOSPADM

## 2017-05-03 RX ORDER — MIDAZOLAM HYDROCHLORIDE 1 MG/ML
INJECTION, SOLUTION INTRAMUSCULAR; INTRAVENOUS AS NEEDED
Status: DISCONTINUED | OUTPATIENT
Start: 2017-05-03 | End: 2017-05-03 | Stop reason: HOSPADM

## 2017-05-03 RX ORDER — SODIUM CHLORIDE 0.9 % (FLUSH) 0.9 %
5-10 SYRINGE (ML) INJECTION AS NEEDED
Status: DISCONTINUED | OUTPATIENT
Start: 2017-05-03 | End: 2017-05-03 | Stop reason: HOSPADM

## 2017-05-03 RX ORDER — DEXAMETHASONE SODIUM PHOSPHATE 4 MG/ML
INJECTION, SOLUTION INTRA-ARTICULAR; INTRALESIONAL; INTRAMUSCULAR; INTRAVENOUS; SOFT TISSUE AS NEEDED
Status: DISCONTINUED | OUTPATIENT
Start: 2017-05-03 | End: 2017-05-03 | Stop reason: HOSPADM

## 2017-05-03 RX ORDER — POVIDONE-IODINE 10 %
SOLUTION, NON-ORAL TOPICAL AS NEEDED
Status: DISCONTINUED | OUTPATIENT
Start: 2017-05-03 | End: 2017-05-03 | Stop reason: HOSPADM

## 2017-05-03 RX ORDER — ACETAMINOPHEN 325 MG/1
650 TABLET ORAL
Status: DISCONTINUED | OUTPATIENT
Start: 2017-05-03 | End: 2017-05-03 | Stop reason: HOSPADM

## 2017-05-03 RX ORDER — PROPOFOL 10 MG/ML
INJECTION, EMULSION INTRAVENOUS AS NEEDED
Status: DISCONTINUED | OUTPATIENT
Start: 2017-05-03 | End: 2017-05-03 | Stop reason: HOSPADM

## 2017-05-03 RX ORDER — CEFAZOLIN SODIUM IN 0.9 % NACL 2 G/50 ML
2 INTRAVENOUS SOLUTION, PIGGYBACK (ML) INTRAVENOUS ONCE
Status: COMPLETED | OUTPATIENT
Start: 2017-05-03 | End: 2017-05-03

## 2017-05-03 RX ORDER — FENTANYL CITRATE 50 UG/ML
INJECTION, SOLUTION INTRAMUSCULAR; INTRAVENOUS AS NEEDED
Status: DISCONTINUED | OUTPATIENT
Start: 2017-05-03 | End: 2017-05-03 | Stop reason: HOSPADM

## 2017-05-03 RX ORDER — HYDROCODONE BITARTRATE AND ACETAMINOPHEN 5; 325 MG/1; MG/1
1 TABLET ORAL
Status: DISCONTINUED | OUTPATIENT
Start: 2017-05-03 | End: 2017-05-03 | Stop reason: HOSPADM

## 2017-05-03 RX ADMIN — PROPOFOL 30 MG: 10 INJECTION, EMULSION INTRAVENOUS at 12:59

## 2017-05-03 RX ADMIN — FENTANYL CITRATE 12.5 MCG: 50 INJECTION, SOLUTION INTRAMUSCULAR; INTRAVENOUS at 12:51

## 2017-05-03 RX ADMIN — CEFAZOLIN 2 G: 1 INJECTION, POWDER, FOR SOLUTION INTRAMUSCULAR; INTRAVENOUS; PARENTERAL at 12:59

## 2017-05-03 RX ADMIN — SODIUM CHLORIDE, SODIUM LACTATE, POTASSIUM CHLORIDE, CALCIUM CHLORIDE: 600; 310; 30; 20 INJECTION, SOLUTION INTRAVENOUS at 12:30

## 2017-05-03 RX ADMIN — MIDAZOLAM HYDROCHLORIDE 2 MG: 1 INJECTION, SOLUTION INTRAMUSCULAR; INTRAVENOUS at 12:51

## 2017-05-03 RX ADMIN — GLYCOPYRROLATE 0.1 MG: 0.2 INJECTION INTRAMUSCULAR; INTRAVENOUS at 13:09

## 2017-05-03 RX ADMIN — PROPOFOL 50 MCG/KG/MIN: 10 INJECTION, EMULSION INTRAVENOUS at 12:59

## 2017-05-03 RX ADMIN — SODIUM CHLORIDE, SODIUM LACTATE, POTASSIUM CHLORIDE, CALCIUM CHLORIDE: 600; 310; 30; 20 INJECTION, SOLUTION INTRAVENOUS at 13:36

## 2017-05-03 RX ADMIN — FENTANYL CITRATE 12.5 MCG: 50 INJECTION, SOLUTION INTRAMUSCULAR; INTRAVENOUS at 12:53

## 2017-05-03 RX ADMIN — GLYCOPYRROLATE 0.1 MG: 0.2 INJECTION INTRAMUSCULAR; INTRAVENOUS at 13:13

## 2017-05-03 NOTE — IP AVS SNAPSHOT
303 [facility] 104 1007 [facility] 
[phone] Patient: Benigno Vásquez MRN: [id number] HEL:[id number] You are allergic to the following No active allergies Recent Documentation Smoking Status Never Smoker Emergency Contacts Name Discharge Info Relation Home Work Mobile Mahogany Ponce  Other Relative [6] [phone] About your hospitalization You were admitted on:  May 3, 2017 You last received care in the:  [facility] PACU You were discharged on:  May 3, 2017 Unit phone number:  [phone] Why you were hospitalized Your primary diagnosis was:  Not on File Providers Seen During Your Hospitalizations Provider Role Specialty Primary office phone Rommel Nguyen DPM Attending Provider Podiatry [phone] Your Primary Care Physician (PCP) Primary Care Physician Office Phone Office Fax Bubba Hernandez [phone] Follow-up Information Follow up With Details Comments Contact Info Brenda Cardoza MD   [address] [facility] [address] [place] [address] 
[phone] Current Discharge Medication List  
  
ASK your doctor about these medications Dose & Instructions Dispensing Information Comments Morning Noon Evening Bedtime  
 albuterol 90 mcg/actuation inhaler Commonly known as:  PROVENTIL HFA, VENTOLIN HFA, PROAIR HFA Your last dose was: Your next dose is:    
   
   
 Dose:  2 Puff Take 2 Puffs by inhalation every four (4) hours as needed for Wheezing or Shortness of Breath. Quantity:  1 Inhaler Refills:  2  
     
   
   
   
  
 aspirin 81 mg chewable tablet Your last dose was: Your next dose is:    
   
   
 Dose:  81 mg Take 1 Tab by mouth daily. Refills:  0 atorvastatin 40 mg tablet Commonly known as:  LIPITOR Your last dose was: Your next dose is:    
   
   
 Dose:  40 mg Take 1 Tab by mouth daily. Quantity:  30 Tab Refills:  5  
     
   
   
   
  
 coenzyme Q-10 200 mg capsule Commonly known as:  CO Q-10 Your last dose was: Your next dose is:    
   
   
 Dose:  200 mg Take 200 mg by mouth daily. Refills:  0  
     
   
   
   
  
 cyclobenzaprine 5 mg tablet Commonly known as:  FLEXERIL Your last dose was: Your next dose is: TAKE 1 TABLET BY MOUTH ONCE DAILY AS NEEDED FOR MUSCLE SPASMS Quantity:  30 Tab Refills:  0 LECITHIN PO Your last dose was: Your next dose is: Take  by mouth daily. Refills:  0  
     
   
   
   
  
 multivitamin tablet Commonly known as:  ONE A DAY Your last dose was: Your next dose is:    
   
   
 Dose:  1 Tab Take 1 Tab by mouth daily. Refills:  0  
     
   
   
   
  
 OTHER Your last dose was: Your next dose is:    
   
   
 Dose:  2 Tab  
2 Tabs daily. Oxyflex Refills:  0 PROBIOTIC 4X 10-15 mg Tbec Generic drug:  B.infantis-B.ani-B.long-B.bifi Your last dose was: Your next dose is: Take  by mouth daily. Refills:  0  
     
   
   
   
  
 silodosin 8 mg capsule Commonly known as:  RAPAFLO Your last dose was: Your next dose is:    
   
   
 Dose:  8 mg Take 8 mg by mouth every other day. Refills:  0 Discharge Instructions [facility], [address] P:   F: [phone] Podiatric Surgery Post-Operative Instructions 1. Foot surgery is more inconvenient than painful.   Although it is normal to have some pain after surgery, you may be surprised at how well you actually feel. In spite of this, it is imperative that you follow the instructions carefully and prepare yourself and your family for an extended period of rest and convalescence. 2. Numbness in the feet will wear off after approximately five to twelve hours (this will vary patient to patient). 3. Start taking your pain medication immediately upon returning home. First take the anti-inflammatory medicine with food or milk. Take the anti-inflammatory even if you experience no pain. The painkiller is only for pain not relieved by the anti-inflammatory and should be taken only if needed. Stop the medication if you develop any abnormal rash, stomach pains, or unusual sensation and call the office. The following medications have been prescribed to you: 
 
Rx pain killer: Percocet 5/325 one tab by mouth every 4 hrs as needed for pain Rx antibiotic: Keflex 500 mg one tab by mouth twice daily for 10 days Rx NSAID: Motrin 800mg one tab by mouth three time a day 4. Black and blue toes or black and blue in areas un-bandaged are a normal occurrence and resolves unremarkably after surgery. 5. The bandage must be kept completely dry after surgery. Notify the doctor if your bandage becomes wet. 6. Your feet should be elevated above your hip at all times. DO not sit with the foot on a hassock or chair. When traveling, sit in the back seat with the foot elevated on pillows. You should sleep with the surgical shoe on for the first three weeks post-operatively or until notified by your doctor that it is safe to be removed while sleeping. 7. Ice may be indicated for your surgery, you should put a cold pack behind the knee of the operative leg, 20 minutes on, 20 minutes off, especially in the first 1-2 days post op. This will reduce pain and swelling and aid in a faster recovery. Instructions for Walking After Foot Surgery 1. Weightbearing status: full weight bearing as tolerated in surgical shoe 2. Most patients can walk with only the surgical shoe after foot surgery (canes and walkers may be needed for additional stability; this will be determined by the doctor or nurses post-operatively). The following are important rules regarding walking after foot surgery for the first one to three weeks: You may walk or stand for bathroom or meals only. No prolonged standing or walking. No public places. Extremely limited use of stairs. Try to stay on one level of your home as long as possible during the day. General rule; 50 minutes off foot with limb elevated, 10 minutes on foot (maximum) for meals and bathroom only. When climbing stairs carefully advance one foot at a time (flatfooted) with assistance of a person and the banister or on your buttocks. Walking With the Surgical Shoe The shoe should be snuggly applied as to avoid movement or shifting while attempting to stand. Please sleep with the shoe on as stated unless otherwise directed by your doctor. While walking, the right foot should be turned out at approximately 2 oclock. Left foot should be turned outward at approximately 10 oclock. Walk with the surgical foot rotated outward as shown. DO not pull toes up in the air by walking on your heel. Keep your foot flat and literally drag it along slightly off the ground surface. Do not push off or bend the front of your foot while walking (no propulsion). Wear a shoe or sneaker of comparable heel height on the non-surgical foot. SIMPLY WALK ON THE FLAT OF YOUR FOOT WITH THE WEIGHT SHIFTED BACKWARDS TOWARDS THE HEEL AND THE BIG TOE TURNED OUTWARD. If you have additional questions or need assistance please call our office for emergency service 24 hours a day. Do not initiate any action or remove the bandages unless directed by your doctor.   If you notice any excessive bleeding or have pain not relieved by the prescribed medications, please call the emergency number without hesitation. DISCHARGE SUMMARY from your Nurse The following personal items collected during your admission are returned to you:  
Dental Appliance: Dental Appliances: None Vision: Visual Aid: None Hearing Aid:   
Jewelry: Jewelry: None Clothing: Clothing: Footwear, Hat, Jacket/Coat, Pants, [place], [employer], Undergarments Other Valuables: Other Valuables: None Valuables sent to safe:   
 
PATIENT INSTRUCTIONS: 
 
After general anesthesia or intravenous sedation, for 24 hours or while taking prescription Narcotics: · Limit your activities · Do not drive and operate hazardous machinery · Do not make important personal or business decisions · Do  not drink alcoholic beverages · If you have not urinated within 8 hours after discharge, please contact your surgeon on call. Report the following to your surgeon: 
· Excessive pain, swelling, redness or odor of or around the surgical area · Temperature over 100.5 · Nausea and vomiting lasting longer than 4 hours or if unable to take medications · Any signs of decreased circulation or nerve impairment to extremity: change in color, persistent  numbness, tingling, coldness or increase pain · Any questions [address] Breathing deep and coughing are very important exercises to do after surgery. Deep breathing and coughing open the little air tubes and air sacks in your lungs. You take deep breaths every day. You may not even notice - it is just something you do when you sigh or yawn. It is a natural exercise you do to keep these air passages open. After surgery, take deep breaths and cough, on purpose. Coughing and deep breathing help prevent bronchitis and pneumonia after surgery. If you had chest or belly surgery, use a pillow as a \"hug buddy\" and hold it tightly to your chest or belly when you cough. DIRECTIONS: 
6. Take 10 to 15 slow deep breaths every hour while awake. 7. Breathe in deeply, and hold it for 2 seconds. 8. Exhale slowly through puckered lips, like blowing up a balloon. 9. After every 4th or 5th deep breath, hug your pillow to your chest or belly and give a hard, deep cough. Yes, it will probably hurt. But doing this exercise is very important part of healing after surgery. Take your pain medicine to help you do this exercise without too much pain. IF YOU HAVE BEEN DIAGNOSED WITH SLEEP APNEA, PLEASE USE YOUR SLEEIFP APNEA DEVICE OR CPAP MACHINE WHEN YOU INTEND TO NAP AFTER TAKING PAIN MEDICATION. Ankle Pumps Ankle pumps increase the circulation of oxygenated blood to your lower extremities and decrease your risk for circulation problems such as blood clots. They also stretch the muscles, tendons and ligaments in your foot and ankle, and prevent joint contracture in the ankle and foot, especially after surgeries on the legs. It is important to do ankle pump exercises regularly after surgery because immobility increases your risk for developing a blood clot. Your doctor may also have you take an Aspirin for the next few days as well. If your doctor did not ask you to take an Aspirin, consult with him before starting Aspirin therapy on your own. Slowly point your foot forward, feeling the muscles on the top of your lower leg stretch, and hold this position for 5 seconds. Next, pull your foot back toward you as far as possible, stretching the calf muscles, and hold that position for 5 seconds. Repeat with the other foot. Perform 10 repetitions every hour while awake for both ankles if possible (down and then up with the foot once is one repetition). You should feel gentle stretching of the muscles in your lower leg when doing this exercise.   If you feel pain, or your range of motion is limited, don't  Push too hard. Only go the limit your joint and muscles will let you go. If you have increasing pain, progressively worsening leg warmth or swelling, STOP the exercise and call your doctor. Below is information about the medications your doctor is prescribing after your visit: 
 
Other information in your discharge envelope: PRESCRIPTIONS PHYSICAL THERAPY PRESCRIPTION 
     APPOINTMENT CARDS [facility] Pamphlet for block or block with On-Q Catheter from Anesthesia Service Medical device information sheets/pamphlets from their  School/work excuse note. /parent work excuse note. These are general instructions for a healthy lifestyle: *  Please give a list of your current medications to your Primary Care Provider. *  Please update this list whenever your medications are discontinued, doses are 
    changed, or new medications (including over-the-counter products) are added. *  Please carry medication information at all times in case of emergency situations. About Smoking No smoking / No tobacco products / Avoid exposure to second hand smoke Surgeon General's Warning:  Quitting smoking now greatly reduces serious risk to your health. Obesity, smoking, and sedentary lifestyle greatly increases your risk for illness and disease. A healthy diet, regular physical exercise & weight monitoring are important for maintaining a healthy lifestyle. Congestive Heart Failure You may be retaining fluid if you have a history of heart failure or if you experience any of the following symptoms:  Weight gain of 3 pounds or more overnight or 5 pounds in a week, increased swelling in our hands or feet or shortness of breath while lying flat in bed. Please call your doctor as soon as you notice any of these symptoms; do not wait until your next office visit. Recognize signs and symptoms of STROKE: 
F - face looks uneven A - arms unable to move or move even S - speech slurred or non-existent T - time-call 911 as soon as signs and symptoms begin-DO NOT go Back to bed or wait to see if you get better-TIME IS BRAIN. Warning signs of HEART ATTACK Call 911 if you have these symptoms · Chest discomfort. Most heart attacks involve discomfort in the center of the chest that lasts more than a few minutes, or that goes away and comes back. It can feel like uncomfortable pressure, squeezing, fullness, or pain. · Discomfort in other areas of the upper body. Symptoms can include pain or discomfort in one or both Arms, the back, neck, jaw, or stomach. ·  Shortness of breath with or without chest discomfort. · Other signs may include breaking out in a cold sweat, nausea, or lightheadedness Don't wait more than five minutes to call 911 - MINUTES MATTER! Fast action can save your life. Calling 911 is almost always the fastest way to get lifesaving treatment. Emergency Medical Services staff can begin treatment when they arrive - up to an hour sooner than if someone gets to the hospital by car. Discharge Orders None ACO Transitions of Care Introducing [employer] [address] Sara Rubio offers a voluntary care coordination program to provide high quality service and care to [facility] fee-for-service beneficiaries. Priscilla Burks was designed to help you enhance your health and well-being through the following services: ? Transitions of Care  support for individuals who are transitioning from one care setting to another (example: Hospital to home). ? Chronic and Complex Care Coordination  support for individuals and caregivers of those with serious or chronic illnesses or with more than one chronic (ongoing) condition and those who take a number of different medications. If you meet specific medical criteria, a [facility] may call you directly to coordinate your care with your primary care physician and your other care providers. For questions about the [facility] programs, please, contact your physicians office. For general questions or additional information about Accountable Care Organizations: 
Please visit www.medicare.gov/acos. html or call 1-800-MEDICARE (6-396.720.9698) TTY users should call 9-261.899.2294. Introducing [facility] SERVICES! Tanis Epley introduces Fl[employer] patient portal. Now you can access parts of your medical record, email your doctor's office, and request medication refills online. 1. In your internet browser, go to [email or web address] 2. Click on the First Time User? Click Here link in the Sign In box. You will see the New Member Sign Up page. 3. Enter your Flipboard Access Code exactly as it appears below. You will not need to use this code after youve completed the sign-up process. If you do not sign up before the expiration date, you must request a new code. · Flipboard Access Code: Hugo Cristina Expires: 6/28/2017 10:17 AM 
 
4. Enter the last four digits of your Social Security Number (xxxx) and Date of Birth (mm/dd/yyyy) as indicated and click Submit. You will be taken to the next sign-up page. 5. Create a Flipboard ID. This will be your Flipboard login ID and cannot be changed, so think of one that is secure and easy to remember. 6. Create a [employer] password. You can change your password at any time. 7. Enter your Password Reset Question and Answer. This can be used at a later time if you forget your password. 8. Enter your e-mail address. You will receive e-mail notification when new information is available in [address] 9. Click Sign Up. You can now view and download portions of your medical record.  
10. Click the Download Summary menu link to download a portable copy of your medical information. If you have questions, please visit the Frequently Asked Questions section of the Mobile [employer] website. Remember, MyChart is NOT to be used for urgent needs. For medical emergencies, dial 911. Now available from your iPhone and Android! General Information Please provide this summary of care documentation to your next provider. Patient Signature:  ____________________________________________________________ Date:  ____________________________________________________________  
  
Lajuanda Eli Provider Signature:  ____________________________________________________________ Date:  ____________________________________________________________

## 2017-05-03 NOTE — PERIOP NOTES
Reviewed DC instructions with pt and wife. Both verbalize understanding. VSS, pt up and dressed without diff.   Left via WC

## 2017-05-03 NOTE — BRIEF OP NOTE
BRIEF OPERATIVE NOTE    Date of Procedure: 5/3/2017   Preoperative Diagnosis: Acquired bilateral hammer toes [M20.41, M20.42]  Callosity [L84]  Unspecified disorder of skin and subcutaneous tissue [L98.9]  Postoperative Diagnosis: Acquired bilateral hammer toes [M20.41, M20.42]    Procedure(s):  RIGHT FOOT 5TH DIGIT ARTHROPLASTY, RIGHT FOOT 4TH DIGIT PROXIMAL PHALANX AND METATARSAL EXOSTECTOMY  (MAC/LOCAL)  Surgeon(s) and Role:     * Lexii Reese DPM - Primary    Assistant      Pb Askew DPM         Surgical Staff:  Circ-1: Liza Jo RN  Scrub Tech-1: Osiris Vogel  Event Time In   Incision Start 1312   Incision Close 1355     Anesthesia: MAC   Estimated Blood Loss: <5cc       Specimens: * No specimens in log *   Findings: Pt tolerated procedure and anesthesia well. Transported to PACU w/o complications.  Vascular status right foot intact  Complications: none  Implants: * No implants in log *

## 2017-05-03 NOTE — DISCHARGE INSTRUCTIONS
[facility]  LARY Allen Alex Hernandez, [address] [facility] El[place], [address] [facility]  P: [phone]  F: [phone]       Podiatric Surgery Post-Operative Instructions    1. Foot surgery is more inconvenient than painful. Although it is normal to have some pain after surgery, you may be surprised at how well you actually feel. In spite of this, it is imperative that you follow the instructions carefully and prepare yourself and your family for an extended period of rest and convalescence. 2. Numbness in the feet will wear off after approximately five to twelve hours (this will vary patient to patient). 3. Start taking your pain medication immediately upon returning home. First take the anti-inflammatory medicine with food or milk. Take the anti-inflammatory even if you experience no pain. The painkiller is only for pain not relieved by the anti-inflammatory and should be taken only if needed. Stop the medication if you develop any abnormal rash, stomach pains, or unusual sensation and call the office. The following medications have been prescribed to you:    Rx pain killer: Percocet 5/325 one tab by mouth every 4 hrs as needed for pain    Rx antibiotic: Keflex 500 mg one tab by mouth twice daily for 10 days    Rx NSAID: Motrin 800mg one tab by mouth three time a day    4. Black and blue toes or black and blue in areas un-bandaged are a normal occurrence and resolves unremarkably after surgery. 5. The bandage must be kept completely dry after surgery. Notify the doctor if your bandage becomes wet. 6. Your feet should be elevated above your hip at all times. DO not sit with the foot on a hassock or chair. When traveling, sit in the back seat with the foot elevated on pillows.   You should sleep with the surgical shoe on for the first three weeks post-operatively or until notified by your doctor that it is safe to be removed while sleeping. 7. Ice may be indicated for your surgery, you should put a cold pack behind the knee of the operative leg, 20 minutes on, 20 minutes off, especially in the first 1-2 days post op. This will reduce pain and swelling and aid in a faster recovery. Instructions for Walking After Foot Surgery    1. Weightbearing status: full weight bearing as tolerated in surgical shoe  2. Most patients can walk with only the surgical shoe after foot surgery (canes and walkers may be needed for additional stability; this will be determined by the doctor or nurses post-operatively). The following are important rules regarding walking after foot surgery for the first one to three weeks: You may walk or stand for bathroom or meals only. No prolonged standing or walking. No public places. Extremely limited use of stairs. Try to stay on one level of your home as long as possible during the day. General rule; 50 minutes off foot with limb elevated, 10 minutes on foot (maximum) for meals and bathroom only. When climbing stairs carefully advance one foot at a time (flatfooted) with assistance of a person and the banister or on your buttocks. Walking With the Surgical Shoe    The shoe should be snuggly applied as to avoid movement or shifting while attempting to stand. Please sleep with the shoe on as stated unless otherwise directed by your doctor. While walking, the right foot should be turned out at approximately 2 oclock. Left foot should be turned outward at approximately 10 oclock. Walk with the surgical foot rotated outward as shown. DO not pull toes up in the air by walking on your heel. Keep your foot flat and literally drag it along slightly off the ground surface. Do not push off or bend the front of your foot while walking (no propulsion). Wear a shoe or sneaker of comparable heel height on the non-surgical foot.   SIMPLY WALK ON THE FLAT OF YOUR FOOT WITH THE WEIGHT SHIFTED BACKWARDS TOWARDS THE HEEL AND THE BIG TOE TURNED OUTWARD. If you have additional questions or need assistance please call our office for emergency service 24 hours a day. Do not initiate any action or remove the bandages unless directed by your doctor. If you notice any excessive bleeding or have pain not relieved by the prescribed medications, please call the emergency number without hesitation. DISCHARGE SUMMARY from your Nurse    The following personal items collected during your admission are returned to you:   Dental Appliance: Dental Appliances: None  Vision: Visual Aid: None  Hearing Aid:    Jewelry: Jewelry: None  Clothing: Clothing: Footwear, Hat, Jacket/Coat, Pants, [place], Socks, Undergarments  Other Valuables: Other Valuables: None  Valuables sent to safe:      PATIENT INSTRUCTIONS:    After general anesthesia or intravenous sedation, for 24 hours or while taking prescription Narcotics:  · Limit your activities  · Do not drive and operate hazardous machinery  · Do not make important personal or business decisions  · Do  not drink alcoholic beverages  · If you have not urinated within 8 hours after discharge, please contact your surgeon on call. Report the following to your surgeon:  · Excessive pain, swelling, redness or odor of or around the surgical area  · Temperature over 100.5  · Nausea and vomiting lasting longer than 4 hours or if unable to take medications  · Any signs of decreased circulation or nerve impairment to extremity: change in color, persistent  numbness, tingling, coldness or increase pain  · Any questions    COUGH AND DEEP BREATHE    Breathing deep and coughing are very important exercises to do after surgery. Deep breathing and coughing open the little air tubes and air sacks in your lungs. You take deep breaths every day. You may not even notice - it is just something you do when you sigh or yawn. It is a natural exercise you do to keep these air passages open.      After surgery, take deep breaths and cough, on purpose. Coughing and deep breathing help prevent bronchitis and pneumonia after surgery. If you had chest or belly surgery, use a pillow as a \"hug perla\" and hold it tightly to your chest or belly when you cough. DIRECTIONS:  6. Take 10 to 15 slow deep breaths every hour while awake. 7. Breathe in deeply, and hold it for 2 seconds. 8. Exhale slowly through puckered lips, like blowing up a balloon. 9. After every 4th or 5th deep breath, hug your pillow to your chest or belly and give a hard, deep cough. Yes, it will probably hurt. But doing this exercise is very important part of healing after surgery. Take your pain medicine to help you do this exercise without too much pain. IF YOU HAVE BEEN DIAGNOSED WITH SLEEP APNEA, PLEASE USE YOUR SLEEIFP APNEA DEVICE OR CPAP MACHINE WHEN YOU INTEND TO NAP AFTER TAKING PAIN MEDICATION. Ankle Pumps    Ankle pumps increase the circulation of oxygenated blood to your lower extremities and decrease your risk for circulation problems such as blood clots. They also stretch the muscles, tendons and ligaments in your foot and ankle, and prevent joint contracture in the ankle and foot, especially after surgeries on the legs. It is important to do ankle pump exercises regularly after surgery because immobility increases your risk for developing a blood clot. Your doctor may also have you take an Aspirin for the next few days as well. If your doctor did not ask you to take an Aspirin, consult with him before starting Aspirin therapy on your own. Slowly point your foot forward, feeling the muscles on the top of your lower leg stretch, and hold this position for 5 seconds. Next, pull your foot back toward you as far as possible, stretching the calf muscles, and hold that position for 5 seconds. Repeat with the other foot.   Perform 10 repetitions every hour while awake for both ankles if possible (down and then up with the foot once is one repetition). You should feel gentle stretching of the muscles in your lower leg when doing this exercise. If you feel pain, or your range of motion is limited, don't  Push too hard. Only go the limit your joint and muscles will let you go. If you have increasing pain, progressively worsening leg warmth or swelling, STOP the exercise and call your doctor. Below is information about the medications your doctor is prescribing after your visit:    Other information in your discharge envelope:  []     PRESCRIPTIONS  []     PHYSICAL THERAPY PRESCRIPTION  []     APPOINTMENT CARDS  []     Regional Anesthesia Pamphlet for block or block with On-Q Catheter from Anesthesia Service  []     Medical device information sheets/pamphlets from their    []     School/work excuse note. []     /parent work excuse note. These are general instructions for a healthy lifestyle:    *  Please give a list of your current medications to your Primary Care Provider. *  Please update this list whenever your medications are discontinued, doses are      changed, or new medications (including over-the-counter products) are added. *  Please carry medication information at all times in case of emergency situations. About Smoking  No smoking / No tobacco products / Avoid exposure to second hand smoke    Surgeon General's Warning:  Quitting smoking now greatly reduces serious risk to your health. Obesity, smoking, and sedentary lifestyle greatly increases your risk for illness and disease. A healthy diet, regular physical exercise & weight monitoring are important for maintaining a healthy lifestyle.     Congestive Heart Failure  You may be retaining fluid if you have a history of heart failure or if you experience any of the following symptoms:  Weight gain of 3 pounds or more overnight or 5 pounds in a week, increased swelling in our hands or feet or shortness of breath while lying flat in bed. Please call your doctor as soon as you notice any of these symptoms; do not wait until your next office visit. Recognize signs and symptoms of STROKE:  F - face looks uneven  A - arms unable to move or move even  S - speech slurred or non-existent  T - time-call 911 as soon as signs and symptoms begin-DO NOT go         Back to bed or wait to see if you get better-TIME IS BRAIN. Warning signs of HEART ATTACK  Call 911 if you have these symptoms    · Chest discomfort. Most heart attacks involve discomfort in the center of the chest that lasts more than a few minutes, or that goes away and comes back. It can feel like uncomfortable pressure, squeezing, fullness, or pain. · Discomfort in other areas of the upper body. Symptoms can include pain or discomfort in one or both        Arms, the back, neck, jaw, or stomach. ·  Shortness of breath with or without chest discomfort. · Other signs may include breaking out in a cold sweat, nausea, or lightheadedness    Don't wait more than five minutes to call 911 - MINUTES MATTER! Fast action can save your life. Calling 911 is almost always the fastest way to get lifesaving treatment. Emergency Medical Services staff can begin treatment when they arrive - up to an hour sooner than if someone gets to the hospital by car.

## 2017-05-03 NOTE — ANESTHESIA POSTPROCEDURE EVALUATION
Post-Anesthesia Evaluation and Assessment    Patient: Rebel Rand MRN: [id number]  SSN: [id number]    YOB: 1950  Age: 78 y.o. Sex: male       Cardiovascular Function/Vital Signs  Visit Vitals    /85    Pulse 67    Temp 36.4 °C (97.5 °F)    Resp 20    SpO2 99%       Patient is status post MAC anesthesia for Procedure(s):  RIGHT FOOT 5TH DIGIT ARTHROPLASTY, RIGHT FOOT 4TH DIGIT PROXIMAL PHALANX AND METATARSAL EXOSTECTOMY  (MAC/LOCAL). Nausea/Vomiting: None    Postoperative hydration reviewed and adequate. Pain:  Pain Scale 1: Numeric (0 - 10) (05/03/17 1420)  Pain Intensity 1: 0 (05/03/17 1420)   Managed    Neurological Status:   Neuro (WDL): Exceptions to WDL (05/03/17 1407)  Neuro  Neurologic State: Drowsy; Eyes open to stimulus (05/03/17 1407)  Orientation Level: Oriented to person (05/03/17 1407)  Cognition: Follows commands (05/03/17 1407)  Speech: Clear (05/03/17 1407)  RLE Motor Response: Weak;Spontaneous ; Purposeful (05/03/17 1136)   At baseline    Mental Status and Level of Consciousness: Arousable    Pulmonary Status:   O2 Device: Room air (05/03/17 1420)   Adequate oxygenation and airway patent    Complications related to anesthesia: None    Post-anesthesia assessment completed.  No concerns    Signed By: Simon Duke MD     May 3, 2017

## 2017-05-03 NOTE — ANESTHESIA PREPROCEDURE EVALUATION
Anesthetic History   No history of anesthetic complications            Review of Systems / Medical History  Patient summary reviewed and pertinent labs reviewed    Pulmonary                Comments: Mild reactive airway disease   Neuro/Psych   Within defined limits           Cardiovascular              Past MI, CAD and cardiac stents    Exercise tolerance: >4 METS  Comments: Hx of parosysmal atrial fibrillation    GI/Hepatic/Renal  Within defined limits              Endo/Other  Within defined limits           Other Findings              Physical Exam    Airway  Mallampati: II  TM Distance: 4 - 6 cm  Neck ROM: normal range of motion   Mouth opening: Normal     Cardiovascular    Rhythm: regular  Rate: normal         Dental  No notable dental hx       Pulmonary  Breath sounds clear to auscultation               Abdominal         Other Findings            Anesthetic Plan    ASA: 3  Anesthesia type: MAC            Anesthetic plan and risks discussed with: Patient and Spouse

## 2017-05-03 NOTE — OP NOTES
Operative Report      Procedure date: 5/3/17       Surgeon: Jose Crawford DPM       1st Asst: Damien Contreras DPM        Preop diagnosis: ulcer in RT 4th interspace; hammertoe RT 5th toe, exostoses on RT 4th metatarsal head / RT 4th proximal phalanx base      Post op diagnosis: same as preop diagnosis      Procedure: RT 5th toe PIPJ arthroplasty, RT 4th proximal phalanx base exostectomy, RT 4th metatarsal head exostectomy      Pathology: none      Anesthesia: local with MAC, 10 cc 1:1 1% plain lidocaine, 0.5% plain marcaine      Hemostasis: PAT @ 250 x 40 minutes      Estimated Blood loss: <5cc      Materials: 3-0 vicryl, 4-0 proene      Injectables: 5cc- 4cc 0.5% marcaine and 1cc dexamethasone phosphate 4%.      Complications: none      Description of procedure:  Pre-operative patient identification was carried out by myself, then the patient was brought to the operating room and placed on the operating table in a supine position. After adequate anesthesia was obtained the RT LE was then prepped and draped in the normal sterile fashion. The surgical site was anesthetized with the aforementioned local anesthetic in field / digital / reverse Velez blocks.      Utilizing a #15 blade an elliptical incision was made over the RT 5th toe, the apices were distal medial and proximal lateral, dissection was carried down to the RT 5th proximal interphalangeal joint. A capsulotomy of the RT 5th PIPJ was performed, then the proximal phalanx head was freed from surrounding structures and resected with a sagittal bone saw. The heloma molle was palpated and an adequate amount of bone to offload the area was noted to have been resected.      Next a #15 blade was used to make an incision extending from the base of the 4th proximal phalanx to just proximal to the 4th metatarsophalangeal joint. Dissection was carried down to the 4th MTPJ and capsulotomy was performed just lateral to the long extensor tendon.  The lateral base of the proximal phalanx of the 4th toe and the lateral 4th metatarsal head were exposed. Sagittal saw used to resect the lateral aspects of these bones. The heloma molle was palpated and an adequate amount of bone to offload the area was noted to have been resected.      All bleeders were cauterized with Bovie. Surgical sites copiously irrigated with NSS. Satisfied with the reduction of the exostoses and deformities, deep/capsular closure was performed by 3-0 vicryl and and skin closed with 4-0 prolene in simple running fashion.      Incisions dressed with betadine gauntlet, 4x4s, Catarino, ACE. PAT was dropped and NV status of operative digits returned to baseline promptly.      The patient tolerated the procedure and anesthesia well, and was transported from the operating room to the PACU with vital signs stable and with the neurovascular status of the operative foot intact.

## 2017-11-03 RX ORDER — CYCLOBENZAPRINE HCL 5 MG
TABLET ORAL
Qty: 30 TAB | Refills: 1 | Status: SHIPPED | OUTPATIENT
Start: 2017-11-03 | End: 2020-01-10 | Stop reason: SDUPTHER

## 2017-12-15 ENCOUNTER — OFFICE VISIT (OUTPATIENT)
Dept: FAMILY MEDICINE CLINIC | Age: 67
End: 2017-12-15

## 2017-12-15 VITALS
DIASTOLIC BLOOD PRESSURE: 76 MMHG | SYSTOLIC BLOOD PRESSURE: 125 MMHG | RESPIRATION RATE: 16 BRPM | HEART RATE: 78 BPM | OXYGEN SATURATION: 99 % | WEIGHT: 152 LBS | HEIGHT: 70 IN | BODY MASS INDEX: 21.76 KG/M2 | TEMPERATURE: 97.1 F

## 2017-12-15 DIAGNOSIS — J06.9 URI WITH COUGH AND CONGESTION: Primary | ICD-10-CM

## 2017-12-15 DIAGNOSIS — Z91.030 BEE STING ALLERGY: ICD-10-CM

## 2017-12-15 RX ORDER — AMOXICILLIN AND CLAVULANATE POTASSIUM 875; 125 MG/1; MG/1
1 TABLET, FILM COATED ORAL EVERY 12 HOURS
Qty: 20 TAB | Refills: 0 | Status: SHIPPED | OUTPATIENT
Start: 2017-12-15 | End: 2017-12-25

## 2017-12-15 RX ORDER — AMOXICILLIN 250 MG/1
250 CAPSULE ORAL 3 TIMES DAILY
COMMUNITY
End: 2017-12-15 | Stop reason: ALTCHOICE

## 2017-12-15 RX ORDER — EPINEPHRINE 0.3 MG/.3ML
0.3 INJECTION SUBCUTANEOUS
Qty: 2 SYRINGE | Refills: 0 | Status: SHIPPED | OUTPATIENT
Start: 2017-12-15 | End: 2020-01-10 | Stop reason: SDUPTHER

## 2017-12-15 RX ORDER — ALBUTEROL SULFATE 90 UG/1
2 AEROSOL, METERED RESPIRATORY (INHALATION)
Qty: 1 INHALER | Refills: 2 | Status: SHIPPED | OUTPATIENT
Start: 2017-12-15 | End: 2020-01-10 | Stop reason: SDUPTHER

## 2017-12-15 NOTE — PATIENT INSTRUCTIONS

## 2017-12-15 NOTE — PROGRESS NOTES
Identified pt with two pt identifiers(name and ). Chief Complaint   Patient presents with    Nasal Congestion     girlfriend with him reports she brought him in as she is conerned becuase he had a tooth pulled on Monday, is on antibx and sx started soon after    Fever     low grade fever         Health Maintenance Due   Topic    COLONOSCOPY     GLAUCOMA SCREENING Q2Y        Wt Readings from Last 3 Encounters:   12/15/17 152 lb (68.9 kg)   17 154 lb 4.8 oz (70 kg)   17 156 lb 9.6 oz (71 kg)     Temp Readings from Last 3 Encounters:   12/15/17 97.1 °F (36.2 °C) (Oral)   17 97.5 °F (36.4 °C)   17 98.5 °F (36.9 °C)     BP Readings from Last 3 Encounters:   12/15/17 125/76   17 160/90   17 150/75     Pulse Readings from Last 3 Encounters:   12/15/17 78   17 61   17 68         Learning Assessment:  :     Learning Assessment 10/27/2015 2015   PRIMARY LEARNER Patient Patient   HIGHEST LEVEL OF EDUCATION - PRIMARY LEARNER  GRADUATED HIGH SCHOOL OR GED -   BARRIERS PRIMARY LEARNER NONE -   CO-LEARNER CAREGIVER No -   PRIMARY LANGUAGE ENGLISH ENGLISH   LEARNER PREFERENCE PRIMARY OTHER (COMMENT) LISTENING   ANSWERED BY self patient   RELATIONSHIP SELF SELF       Depression Screening:  :     PHQ over the last two weeks 3/30/2017   Little interest or pleasure in doing things Not at all   Feeling down, depressed or hopeless Not at all   Total Score PHQ 2 0       Fall Risk Assessment:  :     Fall Risk Assessment, last 12 mths 3/30/2017   Able to walk? Yes   Fall in past 12 months? No       Abuse Screening:  :     Abuse Screening Questionnaire 12/15/2017 10/27/2015   Do you ever feel afraid of your partner? N N   Are you in a relationship with someone who physically or mentally threatens you? N N   Is it safe for you to go home?  Y Y         Coordination of Care Questionnaire:  :     1) Have you been to an emergency room, urgent care clinic since your last visit? no Hospitalized since your last visit? no             2) Have you seen or consulted any other health care providers outside of [address] [place] [address] since your last visit? yes, has been seen by the dentist  (Include any pap smears or colon screenings in this section.)    3) Do you have an Advance Directive on file? no  Are you interested in receiving information about Advance Directives? no    Patient is accompanied by his girlfriend. I have received verbal consent from See Johnson to discuss any/all medical information while they are present in the room. Reviewed record in preparation for visit and have obtained necessary documentation. Medication reconciliation up to date and corrected with patient at this time.

## 2017-12-15 NOTE — PROGRESS NOTES
Subjective:      Juan Diego Mercer is a 75 y.o. male here with complaint of congestion, post nasal drip, productive cough and mild chest discomfort described as anterior chest discomfort during cough only for 8 days with gradually worsening. Associated with low grade fevers at home. He is currently on amoxicillin 250 mg three times daily for dental infection which he started 4 days ago. Evaluation to date: none   Treatment to date: albuterol inhaler - has been taking twice daily for the past few days due to his symptoms       Current Outpatient Prescriptions   Medication Sig Dispense Refill    amoxicillin (AMOXIL) 250 mg capsule Take 250 mg by mouth three (3) times daily.  cyclobenzaprine (FLEXERIL) 5 mg tablet TAKE 1 TABLET BY MOUTH ONCE DAILY AS NEEDED FOR MUSCLE SPASMS 30 Tab 1    coenzyme Q-10 (CO Q-10) 200 mg capsule Take 200 mg by mouth daily.  multivitamin (ONE A DAY) tablet Take 1 Tab by mouth daily.  atorvastatin (LIPITOR) 40 mg tablet Take 1 Tab by mouth daily. 30 Tab 5    silodosin (RAPAFLO) 8 mg capsule Take 8 mg by mouth every other day.  B.infantis-B.ani-B.long-B.bifi (PROBIOTIC 4X) 10-15 mg TbEC Take  by mouth daily.  albuterol (PROVENTIL HFA, VENTOLIN HFA, PROAIR HFA) 90 mcg/actuation inhaler Take 2 Puffs by inhalation every four (4) hours as needed for Wheezing or Shortness of Breath. 1 Inhaler 2    aspirin 81 mg chewable tablet Take 1 Tab by mouth daily. No Known Allergies      Past Medical History:   Diagnosis Date    Atrial fibrillation ([address] 3/9/2015    Back problem     Dyslipidemia 7/25/2011    Embedded metal fragments     chest    Enlarged prostate     MI (myocardial infarction)        Social History   Substance Use Topics    Smoking status: Never Smoker    Smokeless tobacco: Never Used    Alcohol use No        Review of Systems  Pertinent items are noted in HPI.      Objective:     Visit Vitals    /76 (BP 1 Location: Right arm, BP Patient Position: Sitting)    Pulse 78    Temp 97.1 °F (36.2 °C) (Oral)    Resp 16    Ht 5' 10\" (1.778 m)    Wt 152 lb (68.9 kg)    SpO2 99%    BMI 21.81 kg/m2      General appearance - alert, well appearing, and in no distress  Eyes - pupils equal and reactive, extraocular eye movements intact, sclera anicteric  Ears - bilateral TM's and external ear canals normal  Nose - mucosal congestion and mucosal erythema  Oropharyngx - mucous membranes moist, pharynx normal without lesions  Neck - supple, no significant adenopathy  Chest - clear to auscultation, no wheezes, rales or rhonchi, symmetric air entry, no tachypnea, retractions or cyanosis  Heart - normal rate, regular rhythm, normal S1, S2, no murmurs, rubs, clicks or gallops    Assessment/Plan:   Chata Marie is a 75 y.o. male seen for:     1. URI with cough and congestion: stop amoxicillin therapy and will start Augmentin as below. - amoxicillin-clavulanate (AUGMENTIN) 875-125 mg per tablet; Take 1 Tab by mouth every twelve (12) hours for 10 days. Dispense: 20 Tab; Refill: 0  - albuterol (PROVENTIL HFA, VENTOLIN HFA, PROAIR HFA) 90 mcg/actuation inhaler; Take 2 Puffs by inhalation every four (4) hours as needed for Wheezing or Shortness of Breath. Dispense: 1 Inhaler; Refill: 2  - Symptomatic therapy suggested: push fluids, rest and use cough suppressant of choice prn    2. Bee sting allergy  - EPINEPHrine (EPIPEN) 0.3 mg/0.3 mL injection; 0.3 mL by IntraMUSCular route once as needed for up to 1 dose. Indications: Anaphylaxis  Dispense: 2 Syringe; Refill: 0    I have discussed the diagnosis with the patient and the intended plan as seen in the above orders. The patient has received an after-visit summary and questions were answered concerning future plans. I have discussed medication side effects and warnings with the patient as well. Patient verbalizes understanding of plan of care and denies further questions or concerns at this time.  Informed patient to return to the office if symptoms worsen or if new symptoms arise. Follow-up Disposition:  Return if symptoms worsen or fail to improve.

## 2017-12-15 NOTE — MR AVS SNAPSHOT
Visit Information Date & Time Provider Department Dept. Phone Encounter #  
 12/15/2017  8:30 AM Emmanuel MesaGallo [phone] [id number] Follow-up Instructions Return if symptoms worsen or fail to improve. Your Appointments 1/17/2018 10:20 AM  
ESTABLISHED PATIENT with Kanchan Gonzalez MD  
[facility] (RIGO SCHEDULING) Appt Note: annual  
 [address] [facility] [place] [address] [facility]  
[address] [facility] [address] [address] Upcoming Health Maintenance Date Due COLONOSCOPY 11/1/1967 GLAUCOMA SCREENING Q2Y 7/28/2017 MEDICARE YEARLY EXAM 3/31/2018 DTaP/Tdap/Td series (3 - Td) 8/19/2025 Allergies as of 12/15/2017  Review Complete On: 12/15/2017 By: Emmanuel Mesa MD  
 No Known Allergies Current Immunizations  Reviewed on 11/29/2017 Name Date Influenza High Dose Vaccine PF 11/9/2017, 9/28/2015 Pneumococcal Conjugate (PCV-13) 8/19/2015 Pneumococcal Polysaccharide (PPSV-23) 3/30/2017 10:50 AM  
 Tdap 8/19/2015, 8/22/2011 Zoster Vaccine, Live 4/22/2010 Not reviewed this visit You Were Diagnosed With   
  
 Codes Comments URI with cough and congestion    -  Primary ICD-10-CM: J06.9 ICD-9-CM: 465.9 Bee sting allergy     ICD-10-CM: Z91.038 
ICD-9-CM: V15.06 Vitals BP Pulse Temp Resp Height(growth percentile) Weight(growth percentile) 125/76 (BP 1 Location: Right arm, BP Patient Position: Sitting) 78 97.1 °F (36.2 °C) (Oral) 16 5' 10\" (1.778 m) 152 lb (68.9 kg) SpO2 BMI Smoking Status 99% 21.81 kg/m2 Never Smoker Vitals History BMI and BSA Data Body Mass Index Body Surface Area  
 21.81 kg/m 2 1.84 m 2 Preferred Pharmacy Pharmacy Name Phone [facility], [address] [phone] Your Updated Medication List  
  
 This list is accurate as of: 12/15/17  9:13 AM.  Always use your most recent med list.  
  
  
  
  
 albuterol 90 mcg/actuation inhaler Commonly known as:  PROVENTIL HFA, VENTOLIN HFA, PROAIR HFA Take 2 Puffs by inhalation every four (4) hours as needed for Wheezing or Shortness of Breath. amoxicillin-clavulanate 875-125 mg per tablet Commonly known as:  AUGMENTIN Take 1 Tab by mouth every twelve (12) hours for 10 days. aspirin 81 mg chewable tablet Take 1 Tab by mouth daily. atorvastatin 40 mg tablet Commonly known as:  LIPITOR Take 1 Tab by mouth daily. coenzyme Q-10 200 mg capsule Commonly known as:  CO Q-10 Take 200 mg by mouth daily. cyclobenzaprine 5 mg tablet Commonly known as:  FLEXERIL  
TAKE 1 TABLET BY MOUTH ONCE DAILY AS NEEDED FOR MUSCLE SPASMS EPINEPHrine 0.3 mg/0.3 mL injection Commonly known as:  EPIPEN  
0.3 mL by IntraMUSCular route once as needed for up to 1 dose. Indications: Anaphylaxis  
  
 multivitamin tablet Commonly known as:  ONE A DAY Take 1 Tab by mouth daily. PROBIOTIC 4X 10-15 mg Tbec Generic drug:  B.infantis-B.ani-B.long-B.bifi Take  by mouth daily. silodosin 8 mg capsule Commonly known as:  RAPAFLO Take 8 mg by mouth every other day. Prescriptions Sent to Pharmacy Refills  
 amoxicillin-clavulanate (AUGMENTIN) 875-125 mg per tablet 0 Sig: Take 1 Tab by mouth every twelve (12) hours for 10 days. Class: Normal  
 Pharmacy: [place] [address] [address] Ph #: [phone] Route: Oral  
 albuterol (PROVENTIL HFA, VENTOLIN HFA, PROAIR HFA) 90 mcg/actuation inhaler 2 Sig: Take 2 Puffs by inhalation every four (4) hours as needed for Wheezing or Shortness of Breath. Class: Normal  
 Pharmacy: [place] [address] [address] Ph #: [phone] Route: Inhalation  EPINEPHrine (EPIPEN) 0.3 mg/0.3 mL injection 0  
 Si.3 mL by IntraMUSCular route once as needed for up to 1 dose. Indications: Anaphylaxis Class: Normal  
 Pharmacy: [place], [place] - [address] [address]  #: [phone] Route: IntraMUSCular Follow-up Instructions Return if symptoms worsen or fail to improve. Patient Instructions Cough: Care Instructions Your Care Instructions A cough is your body's response to something that bothers your throat or airways. Many things can cause a cough. You might cough because of a cold or the flu, bronchitis, or asthma. Smoking, postnasal drip, allergies, and stomach acid that backs up into your throat also can cause coughs. A cough is a symptom, not a disease. Most coughs stop when the cause, such as a cold, goes away. You can take a few steps at home to cough less and feel better. Follow-up care is a key part of your treatment and safety. Be sure to make and go to all appointments, and call your doctor if you are having problems. It's also a good idea to know your test results and keep a list of the medicines you take. How can you care for yourself at home? · Drink lots of water and other fluids. This helps thin the mucus and soothes a dry or sore throat. Honey or lemon juice in hot water or tea may ease a dry cough. · Take cough medicine as directed by your doctor. · Prop up your head on pillows to help you breathe and ease a dry cough. · Try cough drops to soothe a dry or sore throat. Cough drops don't stop a cough. Medicine-flavored cough drops are no better than candy-flavored drops or hard candy. · Do not smoke. Avoid secondhand smoke. If you need help quitting, talk to your doctor about stop-smoking programs and medicines. These can increase your chances of quitting for good. When should you call for help? Call 911 anytime you think you may need emergency care. For example, call if: 
? · You have severe trouble breathing. ?Call your doctor now or seek immediate medical care if: 
? · You cough up blood. ? · You have new or worse trouble breathing. ? · You have a new or higher fever. ? · You have a new rash. ? Watch closely for changes in your health, and be sure to contact your doctor if: 
? · You cough more deeply or more often, especially if you notice more mucus or a change in the color of your mucus. ? · You have new symptoms, such as a sore throat, an earache, or sinus pain. ? · You do not get better as expected. Where can you learn more? Go to http://argentina-hasmukh.info/. Enter D279 in the search box to learn more about \"Cough: Care Instructions. \" Current as of: May 12, 2017 Content Version: 11.4 © 1486-4722 [employer]. Care instructions adapted under license by [employer] (which disclaims liability or warranty for this information). If you have questions about a medical condition or this instruction, always ask your healthcare professional. [address] any warranty or liability for your use of this information. Introducing [facility]! Keli Frost introduces ToVieFor patient portal. Now you can access parts of your medical record, email your doctor's office, and request medication refills online. 1. In your internet browser, go to [email or web address] 2. Click on the First Time User? Click Here link in the Sign In box. You will see the New Member Sign Up page. 3. Enter your ToVieFor Access Code exactly as it appears below. You will not need to use this code after youve completed the sign-up process. If you do not sign up before the expiration date, you must request a new code. · ToVieFor Access Code: [email or web address] Expires: 3/15/2018  8:51 AM 
 
4. Enter the last four digits of your Social Security Number (xxxx) and Date of Birth (mm/dd/yyyy) as indicated and click Submit.  You will be taken to the next sign-up page. 5. Create a Birdi ID. This will be your Birdi login ID and cannot be changed, so think of one that is secure and easy to remember. 6. Create a Birdi password. You can change your password at any time. 7. Enter your Password Reset Question and Answer. This can be used at a later time if you forget your password. 8. Enter your e-mail address. You will receive e-mail notification when new information is available in [address] 9. Click Sign Up. You can now view and download portions of your medical record. 10. Click the Download Summary menu link to download a portable copy of your medical information. If you have questions, please visit the Frequently Asked Questions section of the Birdi website. Remember, Birdi is NOT to be used for urgent needs. For medical emergencies, dial 911. Now available from your iPhone and Android! Please provide this summary of care documentation to your next provider. Your primary care clinician is listed as Michelle Hernandez. If you have any questions after today's visit, please call [phone].

## 2018-01-02 ENCOUNTER — OFFICE VISIT (OUTPATIENT)
Dept: CARDIOLOGY CLINIC | Age: 68
End: 2018-01-02

## 2018-01-02 VITALS
BODY MASS INDEX: 21.62 KG/M2 | HEART RATE: 63 BPM | SYSTOLIC BLOOD PRESSURE: 136 MMHG | WEIGHT: 151 LBS | HEIGHT: 70 IN | DIASTOLIC BLOOD PRESSURE: 74 MMHG

## 2018-01-02 DIAGNOSIS — E78.5 DYSLIPIDEMIA: ICD-10-CM

## 2018-01-02 DIAGNOSIS — I25.10 CORONARY ARTERY DISEASE INVOLVING NATIVE CORONARY ARTERY OF NATIVE HEART WITHOUT ANGINA PECTORIS: Primary | ICD-10-CM

## 2018-01-02 DIAGNOSIS — M79.10 MUSCLE PAIN: ICD-10-CM

## 2018-01-02 DIAGNOSIS — I48.0 PAF (PAROXYSMAL ATRIAL FIBRILLATION) (HCC): ICD-10-CM

## 2018-01-02 NOTE — PROGRESS NOTES
Alban Hughes MD. [facility]              Patient: Candance Brunswick  : 1950      Today's Date: 2018            HISTORY OF PRESENT ILLNESS:     History of Present Illness:  Mr. Siomara Cleary is here for follow-up. He has been OK without major cardiac complaints. He had an infected root canal 3 weeks ago and felt weak from that. No sig chest pain. Breathing OK overall. Active physically farming. PAST MEDICAL HISTORY:     Past Medical History:   Diagnosis Date    Atrial fibrillation ([address] 2015    Had brief Afib  that resolved on its own withrout obvious recurrence     Back problem     CAD (coronary artery disease)     MI in  (RCA);  PCI to LAD      Dyslipidemia 2011    Embedded metal fragments     chest    Enlarged prostate     MI (myocardial infarction)     MI in          Past Surgical History:   Procedure Laterality Date    HX CORONARY STENT PLACEMENT      HX HEART CATHETERIZATION      x2    HX ORTHOPAEDIC      STRESS TEST CARDIOLITE             MEDICATIONS:     Current Outpatient Prescriptions   Medication Sig Dispense Refill    albuterol (PROVENTIL HFA, VENTOLIN HFA, PROAIR HFA) 90 mcg/actuation inhaler Take 2 Puffs by inhalation every four (4) hours as needed for Wheezing or Shortness of Breath. 1 Inhaler 2    EPINEPHrine (EPIPEN) 0.3 mg/0.3 mL injection 0.3 mL by IntraMUSCular route once as needed for up to 1 dose. Indications: Anaphylaxis 2 Syringe 0    cyclobenzaprine (FLEXERIL) 5 mg tablet TAKE 1 TABLET BY MOUTH ONCE DAILY AS NEEDED FOR MUSCLE SPASMS 30 Tab 1    coenzyme Q-10 (CO Q-10) 200 mg capsule Take 200 mg by mouth daily.  multivitamin (ONE A DAY) tablet Take 1 Tab by mouth daily.  silodosin (RAPAFLO) 8 mg capsule Take 8 mg by mouth every other day.  B.infantis-B.ani-B.long-B.bifi (PROBIOTIC 4X) 10-15 mg TbEC Take  by mouth daily.  aspirin 81 mg chewable tablet Take 1 Tab by mouth daily.          No Known Allergies          SOCIAL HISTORY:     Social History   Substance Use Topics    Smoking status: Never Smoker    Smokeless tobacco: Never Used    Alcohol use No           REVIEW OF SYMPTOMS:     Review of Symptoms:  Constitutional: Negative for fever, chills  HEENT: Negative for nosebleeds, tinnitus, and vision changes. Respiratory: Negative for cough, wheezing  Cardiovascular: Negative for orthopnea, claudication, syncope, and PND. Gastrointestinal: Negative for abdominal pain, diarrhea, melena. Genitourinary: Negative for dysuria  Musculoskeletal: Negative for myalgias. Skin: Negative for rash  Heme: No problems bleeding. Neurological: Negative for speech change and focal weakness. PHYSICAL EXAM:     Physical Exam:  Visit Vitals    /74    Pulse 63    Ht 5' 10\" (1.778 m)    Wt 151 lb (68.5 kg)    BMI 21.67 kg/m2     Patient appears generally well, mood and affect are appropriate and pleasant. HEENT:  Hearing intact, non-icteric, normocephalic, atraumatic. Neck Exam: Supple, No JVD    Lung Exam: Clear to auscultation, even breath sounds. Cardiac Exam: Regular rate and rhythm with no murmur  Abdomen: Soft, non-tender, normal bowel sounds. No bruits or masses. Extremities: Moves all ext well. No lower extremity edema. Psych: Appropriate affect  Neuro - Grossly intact          LABS / OTHER STUDIES:       Lab Results   Component Value Date/Time    Sodium 142 03/31/2017 08:51 AM    Potassium 4.7 03/31/2017 08:51 AM    Chloride 98 03/31/2017 08:51 AM    CO2 26 03/31/2017 08:51 AM    Anion gap 7 03/10/2015 03:35 AM    Glucose 101 03/31/2017 08:51 AM    BUN 17 03/31/2017 08:51 AM    Creatinine 1.07 03/31/2017 08:51 AM    BUN/Creatinine ratio 16 03/31/2017 08:51 AM    GFR est AA 83 03/31/2017 08:51 AM    GFR est non-AA 71 03/31/2017 08:51 AM    Calcium 9.5 03/31/2017 08:51 AM    Bilirubin, total 0.5 03/31/2017 08:51 AM    AST (SGOT) 20 03/31/2017 08:51 AM    Alk.  phosphatase 66 03/31/2017 08:51 AM    Protein, total 6.9 03/31/2017 08:51 AM    Albumin 4.7 03/31/2017 08:51 AM    Globulin 3.3 03/09/2015 08:45 AM    A-G Ratio 2.1 03/31/2017 08:51 AM    ALT (SGPT) 23 03/31/2017 08:51 AM       Lab Results   Component Value Date/Time    WBC 8.4 12/02/2015 02:38 PM    Hemoglobin (POC) 13.6 06/22/2011 04:08 PM    HGB 14.6 12/02/2015 02:38 PM    Hematocrit (POC) 40 06/22/2011 04:08 PM    HCT 42.1 12/02/2015 02:38 PM    PLATELET 607 29/89/2743 02:38 PM    MCV 92 12/02/2015 02:38 PM     Lab Results   Component Value Date/Time    Cholesterol, total 192 03/31/2017 08:51 AM    HDL Cholesterol 63 03/31/2017 08:51 AM    LDL, calculated 114 03/31/2017 08:51 AM    VLDL, calculated 15 03/31/2017 08:51 AM    Triglyceride 76 03/31/2017 08:51 AM    CHOL/HDL Ratio 3.0 06/23/2011 04:00 AM       Lab Results   Component Value Date/Time    TSH 3.780 03/31/2017 08:51 AM    TSH 2.71 03/10/2015 03:35 AM             CARDIAC DIAGNOSTICS:     Cardiac Evaluation Includes:    A. IMI (6/22/11)  B. Cath (6/22/11):  LAD m30; D1 ost90. LCx patent. RCA p30, m100. EF 60% with mild inferior HK. No AVG/MR. C.  PCI mRCA (6/22/11): 3.0x23 Xience. D.  Exercise Cardiolite (10/17/11): Normal perfusion. EF 64%. E.  Cath (3/9/15):  LAD m95; D1 ost90. LCx patent. RCA patent with patent mid stent. EF 50%. No AVG/MR. F.  PCI mLAD (3/9/15): 2.5x23 Xience. G.  Exercise Cardiolite (10/1/15): Normal perfusion, EF 55%. EKG 1/2/18 - NSR, normal           ASSESSMENT AND PLAN:     Assessment and Plan:  1) CAD, prior MI   - denies any anginal complaints   - see below regarding statin     2) Dyslipidemia   - Patient stopped atorvastatin March 2017 due to muscle pain / wasting (discussed with PCP). Refuses crestor based on what he has heard about its side effects.     - I recommended a statin for secondary prevention but he is worried about side effects    - Reviewed risks and benefits and he is agreeable to try pitavastatin 1mg three days a week - recheck lipids in 3 months     3) CDL licence   - he can proceed driving without restrictions (last stress test normal in 2015 and denies anginal complaints)     4) See me back in one year. Patient expressed understanding of the plan - questions were answered. 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 Ricardo Ceja MD, [address]  [address], [facility] [address]. Juana Vogel.  [place]nel[place], [address] [place] [address]  Ph: [phone]   Ph [phone]

## 2018-01-02 NOTE — MR AVS SNAPSHOT
Visit Information Date & Time Provider Department Dept. Phone Encounter #  
 1/2/2018 10:40 AM Divya Victor MD [facility] [place] [phone] [id number] Upcoming Health Maintenance Date Due COLONOSCOPY 11/1/1967 GLAUCOMA SCREENING Q2Y 7/28/2017 MEDICARE YEARLY EXAM 3/31/2018 DTaP/Tdap/Td series (3 - Td) 8/19/2025 Allergies as of 1/2/2018  Review Complete On: 1/2/2018 By: Divya Victor MD  
 No Known Allergies Current Immunizations  Reviewed on 11/29/2017 Name Date Influenza High Dose Vaccine PF 11/9/2017, 9/28/2015 Pneumococcal Conjugate (PCV-13) 8/19/2015 Pneumococcal Polysaccharide (PPSV-23) 3/30/2017 10:50 AM  
 Tdap 8/19/2015, 8/22/2011 Zoster Vaccine, Live 4/22/2010 Not reviewed this visit You Were Diagnosed With   
  
 Codes Comments Dyslipidemia    -  Primary ICD-10-CM: E78.5 ICD-9-CM: 272.4 Coronary artery disease involving native coronary artery of native heart without angina pectoris     ICD-10-CM: I25.10 ICD-9-CM: 414.01   
 PAF (paroxysmal atrial fibrillation) (HCC)     ICD-10-CM: I48.0 ICD-9-CM: 427.31 Muscle pain     ICD-10-CM: M79.1 ICD-9-CM: 729.1 Vitals BP Pulse Height(growth percentile) Weight(growth percentile) BMI Smoking Status 136/74 63 5' 10\" (1.778 m) 151 lb (68.5 kg) 21.67 kg/m2 Never Smoker Vitals History BMI and BSA Data Body Mass Index Body Surface Area  
 21.67 kg/m 2 1.84 m 2 Preferred Pharmacy Pharmacy Name Phone [facility], [address] [phone] Your Updated Medication List  
  
   
This list is accurate as of: 1/2/18 11:10 AM.  Always use your most recent med list.  
  
  
  
  
 albuterol 90 mcg/actuation inhaler Commonly known as:  PROVENTIL HFA, VENTOLIN HFA, PROAIR HFA Take 2 Puffs by inhalation every four (4) hours as needed for Wheezing or Shortness of Breath. aspirin 81 mg chewable tablet Take 1 Tab by mouth daily. coenzyme Q-10 200 mg capsule Commonly known as:  CO Q-10 Take 200 mg by mouth daily. cyclobenzaprine 5 mg tablet Commonly known as:  FLEXERIL  
TAKE 1 TABLET BY MOUTH ONCE DAILY AS NEEDED FOR MUSCLE SPASMS EPINEPHrine 0.3 mg/0.3 mL injection Commonly known as:  EPIPEN  
0.3 mL by IntraMUSCular route once as needed for up to 1 dose. Indications: Anaphylaxis  
  
 multivitamin tablet Commonly known as:  ONE A DAY Take 1 Tab by mouth daily. pitavastatin calcium 1 mg Tab tablet Commonly known as:  LIVALO Take 1 Tab by mouth every Monday, Wednesday, Friday. Start taking on:  1/3/2018 PROBIOTIC 4X 10-15 mg Tbec Generic drug:  B.infantis-B.ani-B.long-B.bifi Take  by mouth daily. silodosin 8 mg capsule Commonly known as:  RAPAFLO Take 8 mg by mouth every other day. Prescriptions Sent to Pharmacy Refills  
 pitavastatin calcium (LIVALO) 1 mg tab tablet 12 Starting on: 1/3/2018 Sig: Take 1 Tab by mouth every Monday, Wednesday, Friday. Class: Normal  
 Pharmacy: [address] [place] Ph #: [phone] Route: Oral  
  
We Performed the Following AMB POC EKG ROUTINE W/ 12 LEADS, INTER & REP [98991 CPT(R)] CK [id number] CPT(R)] LIPID PANEL [60798 CPT(R)] METABOLIC PANEL, COMPREHENSIVE [97474 CPT(R)] Introducing [facility]! Anjana Epps introduces Te[employer]ent Systems patient portal. Now you can access parts of your medical record, email your doctor's office, and request medication refills online. 1. In your internet browser, go to [email or web address] 2. Click on the First Time User? Click Here link in the Sign In box. You will see the New Member Sign Up page. 3. Enter your Telegent Systems Access Code exactly as it appears below. You will not need to use this code after youve completed the sign-up process.  If you do not sign up before the expiration date, you must request a new code. · CloudTalk Access Code: [email or web address] Expires: 3/15/2018  8:51 AM 
 
4. Enter the last four digits of your Social Security Number (xxxx) and Date of Birth (mm/dd/yyyy) as indicated and click Submit. You will be taken to the next sign-up page. 5. Create a CloudTalk ID. This will be your CloudTalk login ID and cannot be changed, so think of one that is secure and easy to remember. 6. Create a CloudTalk password. You can change your password at any time. 7. Enter your Password Reset Question and Answer. This can be used at a later time if you forget your password. 8. Enter your e-mail address. You will receive e-mail notification when new information is available in [address] 9. Click Sign Up. You can now view and download portions of your medical record. 10. Click the Download Summary menu link to download a portable copy of your medical information. If you have questions, please visit the Frequently Asked Questions section of the CloudTalk website. Remember, CloudTalk is NOT to be used for urgent needs. For medical emergencies, dial 911. Now available from your iPhone and Android! Please provide this summary of care documentation to your next provider. Your primary care clinician is listed as Ammy Heath. If you have any questions after today's visit, please call [phone].

## 2018-01-29 ENCOUNTER — DOCUMENTATION ONLY (OUTPATIENT)
Dept: CARDIOLOGY CLINIC | Age: 68
End: 2018-01-29

## 2018-02-15 RX ORDER — AMOXICILLIN AND CLAVULANATE POTASSIUM 875; 125 MG/1; MG/1
1 TABLET, FILM COATED ORAL EVERY 12 HOURS
Qty: 14 TAB | Refills: 0 | Status: SHIPPED | OUTPATIENT
Start: 2018-02-15 | End: 2018-02-22

## 2018-04-24 ENCOUNTER — TELEPHONE (OUTPATIENT)
Dept: FAMILY MEDICINE CLINIC | Age: 68
End: 2018-04-24

## 2018-04-24 NOTE — TELEPHONE ENCOUNTER
Pt already has the shingles vaccination and wants to know if he needs the new version if so please call order into the [place] pharmacy - please advise pt

## 2018-07-10 ENCOUNTER — OFFICE VISIT (OUTPATIENT)
Dept: FAMILY MEDICINE CLINIC | Age: 68
End: 2018-07-10

## 2018-07-10 VITALS
BODY MASS INDEX: 21.33 KG/M2 | DIASTOLIC BLOOD PRESSURE: 58 MMHG | OXYGEN SATURATION: 97 % | RESPIRATION RATE: 18 BRPM | WEIGHT: 149 LBS | HEART RATE: 89 BPM | HEIGHT: 70 IN | SYSTOLIC BLOOD PRESSURE: 102 MMHG | TEMPERATURE: 98.8 F

## 2018-07-10 DIAGNOSIS — Z00.00 MEDICARE ANNUAL WELLNESS VISIT, SUBSEQUENT: ICD-10-CM

## 2018-07-10 DIAGNOSIS — Z23 NEED FOR SHINGLES VACCINE: ICD-10-CM

## 2018-07-10 DIAGNOSIS — Z13.31 SCREENING FOR DEPRESSION: ICD-10-CM

## 2018-07-10 DIAGNOSIS — Z13.39 SCREENING FOR ALCOHOLISM: ICD-10-CM

## 2018-07-10 DIAGNOSIS — Z12.11 SCREEN FOR COLON CANCER: ICD-10-CM

## 2018-07-10 DIAGNOSIS — R25.2 MUSCLE CRAMPING: Primary | ICD-10-CM

## 2018-07-10 DIAGNOSIS — R11.2 NAUSEA AND VOMITING IN ADULT: ICD-10-CM

## 2018-07-10 NOTE — MR AVS SNAPSHOT
[address] [facility] 
 
 
 [address] [facility] 
[phone] Patient: Bg Cantu MRN: [id number] [facility]:[id number] Visit Information Date & Time Provider Department Dept. Phone Encounter #  
 7/10/2018  9:45 AM Gallo Solorzano [facility] [phone] [id number] Follow-up Instructions Return if symptoms worsen or fail to improve. Your Appointments 1/11/2019  9:00 AM  
ESTABLISHED PATIENT with Obed Sinha MD  
[facility] ([facility]) Appt Note: annual  
 [address] [facility] [place] [address] [facility]  
[address] [address] [place] [address] [employer] Upcoming Health Maintenance Date Due COLONOSCOPY 11/1/1968 GLAUCOMA SCREENING Q2Y 7/28/2017 MEDICARE YEARLY EXAM 3/31/2018 Influenza Age 5 to Adult 8/1/2018 DTaP/Tdap/Td series (3 - Td) 8/19/2025 Allergies as of 7/10/2018  Review Complete On: 7/10/2018 By: Sandi Turner MD  
 No Known Allergies Current Immunizations  Reviewed on 7/10/2018 Name Date Influenza High Dose Vaccine PF 11/9/2017, 9/28/2015 Pneumococcal Conjugate (PCV-13) 8/19/2015 Pneumococcal Polysaccharide (PPSV-23) 3/30/2017 10:50 AM  
 Tdap 8/19/2015, 8/22/2011 Zoster Vaccine, Live 4/22/2010 Reviewed by Sandi Turner MD on 7/10/2018 at  9:58 AM  
You Were Diagnosed With   
  
 Codes Comments Muscle cramping    -  Primary ICD-10-CM: R25.2 ICD-9-CM: 729.82 Nausea and vomiting in adult     ICD-10-CM: R11.2 ICD-9-CM: 787.01 Medicare annual wellness visit, subsequent     ICD-10-CM: Z00.00 ICD-9-CM: V70.0 Need for shingles vaccine     ICD-10-CM: J02 ICD-9-CM: V04.89 Screening for alcoholism     ICD-10-CM: Z13.89 ICD-9-CM: V79.1 Screening for depression     ICD-10-CM: Z13.89 ICD-9-CM: V79.0  Screen for colon cancer     ICD-10-CM: Z12.11 
 ICD-9-CM: V76.51 Vitals BP Pulse Temp Resp Height(growth percentile) Weight(growth percentile) 102/58 (BP 1 Location: Right arm, BP Patient Position: Sitting) 89 98.8 °F (37.1 °C) (Oral) 18 5' 10\" (1.778 m) 149 lb (67.6 kg) SpO2 BMI Smoking Status 97% 21.38 kg/m2 Never Smoker Vitals History BMI and BSA Data Body Mass Index Body Surface Area  
 21.38 kg/m 2 1.83 m 2 Preferred Pharmacy Pharmacy Name Phone [facility] [phone] Your Updated Medication List  
  
   
This list is accurate as of 7/10/18 10:39 AM.  Always use your most recent med list.  
  
  
  
  
 albuterol 90 mcg/actuation inhaler Commonly known as:  PROVENTIL HFA, VENTOLIN HFA, PROAIR HFA Take 2 Puffs by inhalation every four (4) hours as needed for Wheezing or Shortness of Breath. aspirin 81 mg chewable tablet Take 1 Tab by mouth daily. cyclobenzaprine 5 mg tablet Commonly known as:  FLEXERIL  
TAKE 1 TABLET BY MOUTH ONCE DAILY AS NEEDED FOR MUSCLE SPASMS EPINEPHrine 0.3 mg/0.3 mL injection Commonly known as:  EPIPEN  
0.3 mL by IntraMUSCular route once as needed for up to 1 dose. Indications: Anaphylaxis  
  
 silodosin 8 mg capsule Commonly known as:  RAPAFLO Take 8 mg by mouth every other day. varicella-zoster recombinant (PF) 50 mcg/0.5 mL Susr injection Commonly known as:  SHINGRIX (PF)  
0.5 mL by IntraMUSCular route once for 1 dose. Indications: PREVENTION OF HERPES ZOSTER Prescriptions Printed Refills  
 varicella-zoster recombinant, PF, (SHINGRIX, PF,) 50 mcg/0.5 mL susr injection 0 Si.5 mL by IntraMUSCular route once for 1 dose. Indications: PREVENTION OF HERPES ZOSTER Class: Print Route: IntraMUSCular We Performed the Following CBC WITH AUTOMATED DIFF [23532 CPT(R)] CK [id number] CPT(R)] [address] [[id number] [facility]] METABOLIC PANEL, COMPREHENSIVE [08463 CPT(R)] REFERRAL FOR COLONOSCOPY [[id number] Custom] Follow-up Instructions Return if symptoms worsen or fail to improve. Referral Information Referral ID Referred By Referred To  
  
 [id number] [facility] [address] [place] [address] Phone: [phone] Fax: [phone] Visits Status Start Date End Date 1 New Request 7/10/18 7/10/19 If your referral has a status of pending review or denied, additional information will be sent to support the outcome of this decision. Patient Instructions Medicare Wellness Visit, Male The best way to live healthy is to have a lifestyle where you eat a well-balanced diet, exercise regularly, limit alcohol use, and quit all forms of tobacco/nicotine, if applicable. Regular preventive services are another way to keep healthy. Preventive services (vaccines, screening tests, monitoring & exams) can help personalize your care plan, which helps you manage your own care. Screening tests can find health problems at the earliest stages, when they are easiest to treat. [address] [facility] follows the current, evidence-based guidelines published by the [facility] [place] (USPSTF) when recommending preventive services for our patients. Because we follow these guidelines, sometimes recommendations change over time as research supports it. (For example, a prostate screening blood test is no longer routinely recommended for men with no symptoms.) Of course, you and your provider may decide to screen more often for some diseases, based on your risk and co-morbidities (chronic disease you are already diagnosed with). Preventive services for you include: - Medicare offers their members a free annual wellness visit, which is time for you and your primary care provider to discuss and plan for your preventive service needs. Take advantage of this benefit every year! 
 
-All people over age 72 should receive the recommended pneumonia vaccines. Current USPSTF guidelines recommend a series of two vaccines for the best pneumonia protection.  
 
-All adults should have a yearly flu vaccine and a tetanus vaccine every 10 years. All adults age 61 years should receive a shingles vaccine once in their lifetime.   
 
-All adults age 38-68 years who are overweight should have a diabetes screening test once every three years.  
 
-Other screening tests & preventive services for persons with diabetes include: an eye exam to screen for diabetic retinopathy, a kidney function test, a foot exam, and stricter control over your cholesterol.  
 
-Cardiovascular screening for adults with routine risk involves an electrocardiogram (ECG) at intervals determined by the provider.  
 
-Colorectal cancer screenings should be done for adults age 54-65 years with normal risk. There are a number of acceptable methods of screening for this type of cancer. Each test has its own benefits and drawbacks. Discuss with your provider what is most appropriate for you during your annual wellness visit. The different tests include: colonoscopy (considered the best screening method), a fecal occult blood test, a fecal DNA test, and sigmoidoscopy. 
 
-All adults born between [facility] should be screened once for Hepatitis C. 
 
-An Abdominal Aortic Aneurysm (AAA) Screening is recommended for men age 73-68 who has ever smoked in their lifetime. Here is a list of your current Health Maintenance items (your personalized list of preventive services) with a due date: 
Health Maintenance Due Topic Date Due  
 Colonoscopy  11[birth year]01/1968  Glaucoma Screening   07/28/2017 [facility] Annual Well Visit  03/31/2018 Nausea and Vomiting: Care Instructions Your Care Instructions When you are nauseated, you may feel weak and sweaty and notice a lot of saliva in your mouth. Nausea often leads to vomiting. Most of the time you do not need to worry about nausea and vomiting, but they can be signs of other illnesses. Two common causes of nausea and vomiting are stomach flu and food poisoning. Nausea and vomiting from viral stomach flu will usually start to improve within 24 hours. Nausea and vomiting from food poisoning may last from 12 to 48 hours. The doctor has checked you carefully, but problems can develop later. If you notice any problems or new symptoms, get medical treatment right away. Follow-up care is a key part of your treatment and safety. Be sure to make and go to all appointments, and call your doctor if you are having problems. It's also a good idea to know your test results and keep a list of the medicines you take. How can you care for yourself at home? · To prevent dehydration, drink plenty of fluids, enough so that your urine is light yellow or clear like water. Choose water and other caffeine-free clear liquids until you feel better. If you have kidney, heart, or liver disease and have to limit fluids, talk with your doctor before you increase the amount of fluids you drink. · Rest in bed until you feel better. · When you are able to eat, try clear soups, mild foods, and liquids until all symptoms are gone for 12 to 48 hours. Other good choices include dry toast, crackers, cooked cereal, and gelatin dessert, such as Jell-O. When should you call for help? Call 911 anytime you think you may need emergency care. For example, call if: 
? · You passed out (lost consciousness). ?Call your doctor now or seek immediate medical care if: 
? · You have symptoms of dehydration, such as: ¨ Dry eyes and a dry mouth. ¨ Passing only a little dark urine. ¨ Feeling thirstier than usual.  
? · You have new or worsening belly pain. ? · You have a new or higher fever. ? · You vomit blood or what looks like coffee grounds. ?Watch closely for changes in your health, and be sure to contact your doctor if: 
? · You have ongoing nausea and vomiting. ? · Your vomiting is getting worse. ? · Your vomiting lasts longer than 2 days. ? · You are not getting better as expected. Where can you learn more? Go to http://argentina-hasmukh.info/. Enter [phone] [id number] in the search box to learn more about \"Nausea and Vomiting: Care Instructions. \" Current as of: March 20, 2017 Content Version: 11.4 © 1832-4405 [employer] Care instructions adapted under license by [employer] (which disclaims liability or warranty for this information). If you have questions about a medical condition or this instruction, always ask your healthcare professional. [address] any warranty or liability for your use of this information. Introducing [facility]! Shania Miles introduces [employer] patient portal. Now you can access parts of your medical record, email your doctor's office, and request medication refills online. 1. In your internet browser, go to [email or web address] 2. Click on the First Time User? Click Here link in the Sign In box. You will see the New Member Sign Up page. 3. Enter your [employer] Access Code exactly as it appears below. You will not need to use this code after youve completed the sign-up process. If you do not sign up before the expiration date, you must request a new code. · [employer] Access Code: [email or web address] Expires: 10/8/2018  9:48 AM 
 
4. Enter the last four digits of your Social Security Number (xxxx) and Date of Birth (mm/dd/yyyy) as indicated and click Submit. You will be taken to the next sign-up page. 5. Create a Zenverget ID. This will be your [employer] login ID and cannot be changed, so think of one that is secure and easy to remember. 6. Create a [employer] password. You can change your password at any time. 7. Enter your Password Reset Question and Answer. This can be used at a later time if you forget your password. 8. Enter your e-mail address. You will receive e-mail notification when new information is available in [address] 9. Click Sign Up. You can now view and download portions of your medical record. 10. Click the Download Summary menu link to download a portable copy of your medical information. If you have questions, please visit the Frequently Asked Questions section of the [employer] website. Remember, [employer] is NOT to be used for urgent needs. For medical emergencies, dial 911. Now available from your iPhone and Android! Please provide this summary of care documentation to your next provider. Your primary care clinician is listed as Alec Ronquillo. If you have any questions after today's visit, please call [phone].

## 2018-07-10 NOTE — PROGRESS NOTES
This is the Subsequent Medicare Annual Wellness Exam, performed 12 months or more after the Initial AWV or the last Subsequent AWV    I have reviewed the patient's medical history in detail and updated the computerized patient record. History     Past Medical History:   Diagnosis Date    Atrial fibrillation ([facility] [address] 03/09/2015    Had brief Afib 2015 that resolved on its own withrout obvious recurrence     Back problem     CAD (coronary artery disease)     MI in 2011 (RCA);  PCI to LAD 2015     Dyslipidemia 7/25/2011    Embedded metal fragments     chest    Enlarged prostate     MI (myocardial infarction) ([facility] [address]     MI in 2011      Past Surgical History:   Procedure Laterality Date    HX CORONARY STENT PLACEMENT      HX HEART CATHETERIZATION      x2    HX ORTHOPAEDIC      STRESS TEST CARDIOLITE         Current Outpatient Prescriptions   Medication Sig Dispense Refill    albuterol (PROVENTIL HFA, VENTOLIN HFA, PROAIR HFA) 90 mcg/actuation inhaler Take 2 Puffs by inhalation every four (4) hours as needed for Wheezing or Shortness of Breath. 1 Inhaler 2    EPINEPHrine (EPIPEN) 0.3 mg/0.3 mL injection 0.3 mL by IntraMUSCular route once as needed for up to 1 dose. Indications: Anaphylaxis 2 Syringe 0    cyclobenzaprine (FLEXERIL) 5 mg tablet TAKE 1 TABLET BY MOUTH ONCE DAILY AS NEEDED FOR MUSCLE SPASMS 30 Tab 1    silodosin (RAPAFLO) 8 mg capsule Take 8 mg by mouth every other day.  aspirin 81 mg chewable tablet Take 1 Tab by mouth daily.          No Known Allergies       Family History   Problem Relation Age of Onset    Heart Disease Mother     Heart Disease Father     Heart Disease Sister     Hypertension Sister     Heart Disease Brother     Asthma Brother        Social History   Substance Use Topics    Smoking status: Never Smoker    Smokeless tobacco: Never Used    Alcohol use No       Patient Active Problem List   Diagnosis Code    Coronary artery disease I25.10    Dyslipidemia E78.5    PAF [facility]paroxysmal atrial fibrillation) ([facility] I48.0    Dyspepsia R10.13       Depression Risk Factor Screening:     PHQ over the last two weeks 7/10/2018   Little interest or pleasure in doing things Not at all   Feeling down, depressed or hopeless Not at all   Total Score PHQ 2 0     Alcohol Risk Factor Screening: You do not drink alcohol or very rarely. Functional Ability and Level of Safety:   Hearing Loss  Hearing is good. Activities of Daily Living  The home contains: no safety equipment. Abuse Screening Questionnaire 7/10/2018   Do you ever feel afraid of your partner? N   Are you in a relationship with someone who physically or mentally threatens you? N   Is it safe for you to go home? Y         Fall Risk  Fall Risk Assessment, last 12 mths 7/10/2018   Able to walk? Yes   Fall in past 12 months? No       Abuse Screen  Abuse Screening Questionnaire 7/10/2018   Do you ever feel afraid of your partner? N   Are you in a relationship with someone who physically or mentally threatens you? N   Is it safe for you to go home? Y       Cognitive Screening   Evaluation of Cognitive Function:  Has your family/caregiver stated any concerns about your memory: no  Normal    Patient Care Team   Patient Care Team:  Derrick Barclay MD as PCP - General (Family Practice)  Lachelle Villasenor MD (Urology)  America Carrillo MD as Physician (Cardiology)    Assessment/Plan   Education and counseling provided:  Prostate cancer screening tests (PSA, covered annually)  Colorectal cancer screening tests  Screening for glaucoma  Shingles Vaccine     Diagnoses and all orders for this visit:    1. Muscle cramping  -     CK    2. Nausea and vomiting in adult  -     METABOLIC PANEL, COMPREHENSIVE  -     CBC WITH AUTOMATED DIFF    3. Medicare annual wellness visit, subsequent    4.  Need for shingles vaccine  -     varicella-zoster recombinant, PF, (SHINGRIX, PF,) 50 mcg/0.5 mL susr injection; 0.5 mL by IntraMUSCular route once for 1 dose. Indications: PREVENTION OF HERPES ZOSTER    5. Screening for alcoholism    6. Screening for depression  -     Depression Screen Annual    7.  Screen for colon cancer  -     Referral for Colonoscopy (options for GI, Colon &  Rectal Surgery, & General Surgery)      Health Maintenance Due   Topic Date Due    COLONOSCOPY  11/01/1968    GLAUCOMA SCREENING Q2Y  07/28/2017    MEDICARE YEARLY EXAM  03/31/2018

## 2018-07-10 NOTE — PROGRESS NOTES
Identified pt with two pt identifiers(name and ). Chief Complaint   Patient presents with    Fever     All symptoms began yesterday. 100.4 this morning    Vomiting    Generalized Body Aches        Health Maintenance Due   Topic    COLONOSCOPY     GLAUCOMA SCREENING Q2Y     MEDICARE YEARLY EXAM        Wt Readings from Last 3 Encounters:   07/10/18 149 lb (67.6 kg)   18 151 lb (68.5 kg)   12/15/17 152 lb (68.9 kg)     Temp Readings from Last 3 Encounters:   07/10/18 98.8 °F (37.1 °C) (Oral)   12/15/17 97.1 °F (36.2 °C) (Oral)   17 97.5 °F (36.4 °C)     BP Readings from Last 3 Encounters:   07/10/18 102/58   18 136/74   12/15/17 125/76     Pulse Readings from Last 3 Encounters:   07/10/18 89   18 63   12/15/17 78         Learning Assessment:  :     Learning Assessment 10/27/2015 2015   PRIMARY LEARNER Patient Patient   HIGHEST LEVEL OF EDUCATION - PRIMARY LEARNER  GRADUATED HIGH SCHOOL OR GED -   BARRIERS PRIMARY LEARNER NONE -   CO-LEARNER CAREGIVER No -   PRIMARY LANGUAGE ENGLISH ENGLISH   LEARNER PREFERENCE PRIMARY OTHER (COMMENT) LISTENING   ANSWERED BY self patient   RELATIONSHIP SELF SELF       Depression Screening:  :     PHQ over the last two weeks 7/10/2018   Little interest or pleasure in doing things Not at all   Feeling down, depressed or hopeless Not at all   Total Score PHQ 2 0       Fall Risk Assessment:  :     Fall Risk Assessment, last 12 mths 7/10/2018   Able to walk? Yes   Fall in past 12 months? No       Abuse Screening:  :     Abuse Screening Questionnaire 7/10/2018 12/15/2017 10/27/2015   Do you ever feel afraid of your partner? N N N   Are you in a relationship with someone who physically or mentally threatens you? N N N   Is it safe for you to go home?  Y Y Y       Coordination of Care Questionnaire:  :     1) Have you been to an emergency room, urgent care clinic since your last visit? no   Hospitalized since your last visit? no             2) Have you seen or consulted any other health care providers outside of [facility] since your last visit? no  (Include any pap smears or colon screenings in this section.)    3) Do you have an Advance Directive on file? no  Are you interested in receiving information about Advance Directives? no    Reviewed record in preparation for visit and have obtained necessary documentation. Medication reconciliation up to date and corrected with patient at this time.

## 2018-07-10 NOTE — PATIENT INSTRUCTIONS
Medicare Wellness Visit, Male    The best way to live healthy is to have a lifestyle where you eat a well-balanced diet, exercise regularly, limit alcohol use, and quit all forms of tobacco/nicotine, if applicable. Regular preventive services are another way to keep healthy. Preventive services (vaccines, screening tests, monitoring & exams) can help personalize your care plan, which helps you manage your own care. Screening tests can find health problems at the earliest stages, when they are easiest to treat. [facility] follows the current, evidence-based guidelines published by the [facility] ([facility]) when recommending preventive services for our patients. Because we follow these guidelines, sometimes recommendations change over time as research supports it. (For example, a prostate screening blood test is no longer routinely recommended for men with no symptoms.)    Of course, you and your provider may decide to screen more often for some diseases, based on your risk and co-morbidities (chronic disease you are already diagnosed with). Preventive services for you include:    - Medicare offers their members a free annual wellness visit, which is time for you and your primary care provider to discuss and plan for your preventive service needs. Take advantage of this benefit every year!    -All people over age 72 should receive the recommended pneumonia vaccines. Current USPSTF guidelines recommend a series of two vaccines for the best pneumonia protection.     -All adults should have a yearly flu vaccine and a tetanus vaccine every 10 years.  All adults age 61 years should receive a shingles vaccine once in their lifetime.      -All adults age 38-68 years who are overweight should have a diabetes screening test once every three years.     -Other screening tests & preventive services for persons with diabetes include: an eye exam to screen for diabetic retinopathy, a kidney function test, a foot exam, and stricter control over your cholesterol.     -Cardiovascular screening for adults with routine risk involves an electrocardiogram (ECG) at intervals determined by the provider.     -Colorectal cancer screenings should be done for adults age 54-65 years with normal risk. There are a number of acceptable methods of screening for this type of cancer. Each test has its own benefits and drawbacks. Discuss with your provider what is most appropriate for you during your annual wellness visit. The different tests include: colonoscopy (considered the best screening method), a fecal occult blood test, a fecal DNA test, and sigmoidoscopy.    -All adults born between [facility] should be screened once for Hepatitis C.    -An Abdominal Aortic Aneurysm (AAA) Screening is recommended for men age 73-68 who has ever smoked in their lifetime. Here is a list of your current Health Maintenance items (your personalized list of preventive services) with a due date:  Health Maintenance Due   Topic Date Due    Colonoscopy  11/01/1968    Glaucoma Screening   07/28/2017    Annual Well Visit  03/31/2018          Nausea and Vomiting: Care Instructions  Your Care Instructions    When you are nauseated, you may feel weak and sweaty and notice a lot of saliva in your mouth. Nausea often leads to vomiting. Most of the time you do not need to worry about nausea and vomiting, but they can be signs of other illnesses. Two common causes of nausea and vomiting are stomach flu and food poisoning. Nausea and vomiting from viral stomach flu will usually start to improve within 24 hours. Nausea and vomiting from food poisoning may last from 12 to 48 hours. The doctor has checked you carefully, but problems can develop later. If you notice any problems or new symptoms, get medical treatment right away. Follow-up care is a key part of your treatment and safety.  Be sure to make and go to all appointments, and call your doctor if you are having problems. It's also a good idea to know your test results and keep a list of the medicines you take. How can you care for yourself at home? · To prevent dehydration, drink plenty of fluids, enough so that your urine is light yellow or clear like water. Choose water and other caffeine-free clear liquids until you feel better. If you have kidney, heart, or liver disease and have to limit fluids, talk with your doctor before you increase the amount of fluids you drink. · Rest in bed until you feel better. · When you are able to eat, try clear soups, mild foods, and liquids until all symptoms are gone for 12 to 48 hours. Other good choices include dry toast, crackers, cooked cereal, and gelatin dessert, such as Jell-O. When should you call for help? Call 911 anytime you think you may need emergency care. For example, call if:  ? · You passed out (lost consciousness). ?Call your doctor now or seek immediate medical care if:  ? · You have symptoms of dehydration, such as:  ¨ Dry eyes and a dry mouth. ¨ Passing only a little dark urine. ¨ Feeling thirstier than usual.   ? · You have new or worsening belly pain. ? · You have a new or higher fever. ? · You vomit blood or what looks like coffee grounds. ? Watch closely for changes in your health, and be sure to contact your doctor if:  ? · You have ongoing nausea and vomiting. ? · Your vomiting is getting worse. ? · Your vomiting lasts longer than 2 days. ? · You are not getting better as expected. Where can you learn more? Go to http://argentina-hasmukh.info/. Enter 25 [id number] in the search box to learn more about \"Nausea and Vomiting: Care Instructions. \"  Current as of: March 20, 2017  Content Version: 11.4  © 1392-0692 [employer], [employer]. Care instructions adapted under license by [employer] (which disclaims liability or warranty for this information).  If you have questions about a medical condition or this instruction, always ask your healthcare professional. [place] [address] any warranty or liability for your use of this information.

## 2018-07-10 NOTE — ACP (ADVANCE CARE PLANNING)
Advance Care Planning (ACP) Provider Conversation Snapshot    Date of ACP Conversation: 07/10/18  Persons included in Conversation:  patient and POA  Length of ACP Conversation in minutes:  <16 minutes (Non-Billable)    Authorized Decision Maker (if patient is incapable of making informed decisions): This person is:   Healthcare Agent/Medical Power of  under Advance Directive - Joleen Fairbanks           For Patients with Decision Making Capacity:   Values/Goals: Exploration of values, goals, and preferences if recovery is not expected, even with continued medical treatment in the event of:  Imminent death  Severe, permanent brain injury  \"In these circumstances, what matters most to you? \"  Care focused more on comfort or quality of life.     Conversation Outcomes / Follow-Up Plan:   Reviewed existing Advance Directive   Recommended review of completed ACP document annually or upon change in health status

## 2018-07-10 NOTE — PROGRESS NOTES
Subjective:      Mavis Kocher is a 75 y.o. male here with complaint of weakness, feeling tired, headache, and lower back pain. History provided by patient and significant other. Symptoms started yesterday while he was working outside. Associated with fever (Tmax 100.4 degrees F at 6:00 AM for which he has taken Tylenol), nausea, vomiting (twice today), muscle cramping (arms and legs). Denies chest pain or discomfort, presyncope, abdominal pain, dysuria, change in urine. He reports that he has been eating and drinking okay. Evaluation to date: none. Treatment to date: Tylenol, Zofran, albuterol     Current Outpatient Prescriptions   Medication Sig Dispense Refill    albuterol (PROVENTIL HFA, VENTOLIN HFA, PROAIR HFA) 90 mcg/actuation inhaler Take 2 Puffs by inhalation every four (4) hours as needed for Wheezing or Shortness of Breath. 1 Inhaler 2    EPINEPHrine (EPIPEN) 0.3 mg/0.3 mL injection 0.3 mL by IntraMUSCular route once as needed for up to 1 dose. Indications: Anaphylaxis 2 Syringe 0    cyclobenzaprine (FLEXERIL) 5 mg tablet TAKE 1 TABLET BY MOUTH ONCE DAILY AS NEEDED FOR MUSCLE SPASMS 30 Tab 1    silodosin (RAPAFLO) 8 mg capsule Take 8 mg by mouth every other day.  aspirin 81 mg chewable tablet Take 1 Tab by mouth daily. No Known Allergies      Past Medical History:   Diagnosis Date    Atrial fibrillation ([facility] [address] 03/09/2015    Had brief Afib 2015 that resolved on its own withrout obvious recurrence     Back problem     CAD (coronary artery disease)     MI in 2011 (RCA);  PCI to LAD 2015     Dyslipidemia 7/25/2011    Embedded metal fragments     chest    Enlarged prostate     MI (myocardial infarction) ([facility] [address]     MI in 2011       Social History   Substance Use Topics    Smoking status: Never Smoker    Smokeless tobacco: Never Used    Alcohol use No        Review of Systems  Pertinent items are noted in HPI.      Objective:     Visit Vitals    /58 (BP 1 Location: Right arm, BP Patient Position: Sitting)  Comment: Manual    Pulse 89    Temp 98.8 °F (37.1 °C) (Oral)  Comment: .  Resp 18    Ht 5' 10\" (1.778 m)    Wt 149 lb (67.6 kg)    SpO2 97%    BMI 21.38 kg/m2      General appearance - alert, mildly ill appearing, and in no distress  Eyes - pupils equal and reactive, extraocular eye movements intact, sclera anicteric  Oropharyngx - mucous membranes moist, pharynx normal without lesions  Neck - supple, no significant adenopathy  Chest - clear to auscultation, no wheezes, rales or rhonchi, symmetric air entry, no tachypnea, retractions or cyanosis  Heart - normal rate, regular rhythm, normal S1, S2, no murmurs, rubs, clicks or gallops  Abdomen - soft, nontender, nondistended, no masses or organomegaly  Neurological - alert, oriented, normal speech, no focal findings or movement disorder noted  Extremities - peripheral pulses normal, no pedal edema, no clubbing or cyanosis    Assessment/Plan:   Shellie Mariee is a 75 y.o. male seen for:     1. Muscle cramping: afebrile, vitals stable and exam benign. Check labs. Advised to push fluids and rest today. Signs/symptoms that would warrant reevaluation discussed. - CK    2. Nausea and vomiting in adult: differential includes secondary to heat vs viral. Examination benign at this time. Treat as above and check labs. - METABOLIC PANEL, COMPREHENSIVE  - CBC WITH AUTOMATED DIFF    3. Medicare annual wellness visit, subsequent    4. Need for shingles vaccine  - varicella-zoster recombinant, PF, (SHINGRIX, PF,) 50 mcg/0.5 mL susr injection; 0.5 mL by IntraMUSCular route once for 1 dose. Indications: PREVENTION OF HERPES ZOSTER  Dispense: 0.5 mL; Refill: 0    5. Screening for alcoholism    6. Screening for depression  - Depression Screen Annual    7.  Screen for colon cancer  - Referral for Colonoscopy (options for GI, Colon &  Rectal Surgery, & General Surgery)    I have discussed the diagnosis with the patient and the intended plan as seen in the above orders. The patient has received an after-visit summary and questions were answered concerning future plans. I have discussed medication side effects and warnings with the patient as well. Patient verbalizes understanding of plan of care and denies further questions or concerns at this time. Informed patient to return to the office if symptoms worsen or if new symptoms arise. Follow-up Disposition:  Return if symptoms worsen or fail to improve.

## 2018-07-11 ENCOUNTER — HOSPITAL ENCOUNTER (EMERGENCY)
Age: 68
Discharge: HOME | End: 2018-07-11
Attending: EMERGENCY MEDICINE
Payer: MEDICARE

## 2018-07-11 ENCOUNTER — APPOINTMENT (OUTPATIENT)
Dept: GENERAL RADIOLOGY | Age: 68
End: 2018-07-11
Attending: EMERGENCY MEDICINE
Payer: MEDICARE

## 2018-07-11 ENCOUNTER — TELEPHONE (OUTPATIENT)
Dept: FAMILY MEDICINE CLINIC | Age: 68
End: 2018-07-11

## 2018-07-11 VITALS
TEMPERATURE: 99.4 F | SYSTOLIC BLOOD PRESSURE: 110 MMHG | BODY MASS INDEX: 22.05 KG/M2 | RESPIRATION RATE: 19 BRPM | WEIGHT: 154 LBS | HEIGHT: 70 IN | HEART RATE: 90 BPM | DIASTOLIC BLOOD PRESSURE: 90 MMHG | OXYGEN SATURATION: 96 %

## 2018-07-11 DIAGNOSIS — R79.89 ABNORMAL CBC: ICD-10-CM

## 2018-07-11 DIAGNOSIS — N30.00 ACUTE CYSTITIS WITHOUT HEMATURIA: Primary | ICD-10-CM

## 2018-07-11 DIAGNOSIS — E87.6 HYPOKALEMIA: Primary | ICD-10-CM

## 2018-07-11 DIAGNOSIS — E86.0 DEHYDRATION: ICD-10-CM

## 2018-07-11 LAB
ALBUMIN SERPL-MCNC: 2.7 G/DL (ref 3.5–5)
ALBUMIN SERPL-MCNC: 4 G/DL (ref 3.6–4.8)
ALBUMIN/GLOB SERPL: 0.8 {RATIO} (ref 1.1–2.2)
ALBUMIN/GLOB SERPL: 1.8 {RATIO} (ref 1.2–2.2)
ALP SERPL-CCNC: 64 IU/L (ref 39–117)
ALP SERPL-CCNC: 81 U/L (ref 45–117)
ALT SERPL-CCNC: 17 IU/L (ref 0–44)
ALT SERPL-CCNC: 25 U/L (ref 12–78)
ANION GAP SERPL CALC-SCNC: 8 MMOL/L (ref 5–15)
APPEARANCE UR: ABNORMAL
AST SERPL-CCNC: 19 U/L (ref 15–37)
AST SERPL-CCNC: 27 IU/L (ref 0–40)
ATRIAL RATE: 82 BPM
BACTERIA URNS QL MICRO: ABNORMAL /HPF
BASOPHILS # BLD AUTO: 0 X10E3/UL (ref 0–0.2)
BASOPHILS # BLD: 0 K/UL (ref 0–0.1)
BASOPHILS NFR BLD AUTO: 0 %
BASOPHILS NFR BLD: 0 % (ref 0–1)
BILIRUB SERPL-MCNC: 0.8 MG/DL (ref 0.2–1)
BILIRUB SERPL-MCNC: 1.4 MG/DL (ref 0–1.2)
BILIRUB UR QL: NEGATIVE
BUN SERPL-MCNC: 17 MG/DL (ref 8–27)
BUN SERPL-MCNC: 21 MG/DL (ref 6–20)
BUN/CREAT SERPL: 14 (ref 10–24)
BUN/CREAT SERPL: 15 (ref 12–20)
CALCIUM SERPL-MCNC: 8.6 MG/DL (ref 8.5–10.1)
CALCIUM SERPL-MCNC: 8.6 MG/DL (ref 8.6–10.2)
CALCULATED P AXIS, ECG09: 77 DEGREES
CALCULATED R AXIS, ECG10: -9 DEGREES
CALCULATED T AXIS, ECG11: 63 DEGREES
CHLORIDE SERPL-SCNC: 98 MMOL/L (ref 96–106)
CHLORIDE SERPL-SCNC: 99 MMOL/L (ref 97–108)
CK SERPL-CCNC: 50 U/L (ref 39–308)
CK SERPL-CCNC: 88 U/L (ref 24–204)
CO2 SERPL-SCNC: 24 MMOL/L (ref 20–29)
CO2 SERPL-SCNC: 27 MMOL/L (ref 21–32)
COLOR UR: ABNORMAL
CREAT SERPL-MCNC: 1.18 MG/DL (ref 0.76–1.27)
CREAT SERPL-MCNC: 1.44 MG/DL (ref 0.7–1.3)
DIAGNOSIS, 93000: NORMAL
DIFFERENTIAL METHOD BLD: ABNORMAL
EOSINOPHIL # BLD AUTO: 0 X10E3/UL (ref 0–0.4)
EOSINOPHIL # BLD: 0 K/UL (ref 0–0.4)
EOSINOPHIL NFR BLD AUTO: 0 %
EOSINOPHIL NFR BLD: 0 % (ref 0–7)
EPITH CASTS URNS QL MICRO: ABNORMAL /LPF
ERYTHROCYTE [DISTWIDTH] IN BLOOD BY AUTOMATED COUNT: 12.5 % (ref 11.5–14.5)
ERYTHROCYTE [DISTWIDTH] IN BLOOD BY AUTOMATED COUNT: 13.5 % (ref 12.3–15.4)
GLOBULIN SER CALC-MCNC: 2.2 G/DL (ref 1.5–4.5)
GLOBULIN SER CALC-MCNC: 3.6 G/DL (ref 2–4)
GLUCOSE SERPL-MCNC: 125 MG/DL (ref 65–100)
GLUCOSE SERPL-MCNC: 98 MG/DL (ref 65–99)
GLUCOSE UR STRIP.AUTO-MCNC: NEGATIVE MG/DL
HCT VFR BLD AUTO: 38.1 % (ref 36.6–50.3)
HCT VFR BLD AUTO: 42.3 % (ref 37.5–51)
HGB BLD-MCNC: 12.9 G/DL (ref 12.1–17)
HGB BLD-MCNC: 14.1 G/DL (ref 13–17.7)
HGB UR QL STRIP: ABNORMAL
HYALINE CASTS URNS QL MICRO: ABNORMAL /LPF (ref 0–5)
IMM GRANULOCYTES # BLD: 0 K/UL
IMM GRANULOCYTES # BLD: 0 X10E3/UL (ref 0–0.1)
IMM GRANULOCYTES NFR BLD AUTO: 0 %
IMM GRANULOCYTES NFR BLD: 0 %
KETONES UR QL STRIP.AUTO: NEGATIVE MG/DL
LEUKOCYTE ESTERASE UR QL STRIP.AUTO: ABNORMAL
LYMPHOCYTES # BLD AUTO: 0.5 X10E3/UL (ref 0.7–3.1)
LYMPHOCYTES # BLD: 0.1 K/UL (ref 0.8–3.5)
LYMPHOCYTES NFR BLD AUTO: 5 %
LYMPHOCYTES NFR BLD: 1 % (ref 12–49)
MCH RBC QN AUTO: 31.2 PG (ref 26–34)
MCH RBC QN AUTO: 31.5 PG (ref 26.6–33)
MCHC RBC AUTO-ENTMCNC: 33.3 G/DL (ref 31.5–35.7)
MCHC RBC AUTO-ENTMCNC: 33.9 G/DL (ref 30–36.5)
MCV RBC AUTO: 92 FL (ref 80–99)
MCV RBC AUTO: 95 FL (ref 79–97)
MONOCYTES # BLD AUTO: 0.4 X10E3/UL (ref 0.1–0.9)
MONOCYTES # BLD: 0.3 K/UL (ref 0–1)
MONOCYTES NFR BLD AUTO: 4 %
MONOCYTES NFR BLD: 4 % (ref 5–13)
NEUTROPHILS # BLD AUTO: 9.4 X10E3/UL (ref 1.4–7)
NEUTROPHILS NFR BLD AUTO: 91 %
NEUTS BAND NFR BLD MANUAL: 5 % (ref 0–6)
NEUTS SEG # BLD: 6.1 K/UL (ref 1.8–8)
NEUTS SEG NFR BLD: 90 % (ref 32–75)
NITRITE UR QL STRIP.AUTO: NEGATIVE
NRBC # BLD: 0 K/UL (ref 0–0.01)
NRBC BLD-RTO: 0 PER 100 WBC
P-R INTERVAL, ECG05: 166 MS
PH UR STRIP: 6 [PH] (ref 5–8)
PLATELET # BLD AUTO: 117 K/UL (ref 150–400)
PLATELET # BLD AUTO: 160 X10E3/UL (ref 150–379)
PMV BLD AUTO: 9.9 FL (ref 8.9–12.9)
POTASSIUM SERPL-SCNC: 3.2 MMOL/L (ref 3.5–5.1)
POTASSIUM SERPL-SCNC: 3.9 MMOL/L (ref 3.5–5.2)
PROT SERPL-MCNC: 6.2 G/DL (ref 6–8.5)
PROT SERPL-MCNC: 6.3 G/DL (ref 6.4–8.2)
PROT UR STRIP-MCNC: ABNORMAL MG/DL
Q-T INTERVAL, ECG07: 352 MS
QRS DURATION, ECG06: 82 MS
QTC CALCULATION (BEZET), ECG08: 411 MS
RBC # BLD AUTO: 4.14 M/UL (ref 4.1–5.7)
RBC # BLD AUTO: 4.47 X10E6/UL (ref 4.14–5.8)
RBC #/AREA URNS HPF: ABNORMAL /HPF (ref 0–5)
RBC MORPH BLD: ABNORMAL
SODIUM SERPL-SCNC: 134 MMOL/L (ref 136–145)
SODIUM SERPL-SCNC: 140 MMOL/L (ref 134–144)
SP GR UR REFRACTOMETRY: 1.01 (ref 1–1.03)
TROPONIN I SERPL-MCNC: <0.05 NG/ML
UA: UC IF INDICATED,UAUC: ABNORMAL
UROBILINOGEN UR QL STRIP.AUTO: 0.2 EU/DL (ref 0.2–1)
VENTRICULAR RATE, ECG03: 82 BPM
WBC # BLD AUTO: 10.4 X10E3/UL (ref 3.4–10.8)
WBC # BLD AUTO: 6.5 K/UL (ref 4.1–11.1)
WBC URNS QL MICRO: ABNORMAL /HPF (ref 0–4)

## 2018-07-11 PROCEDURE — 87086 URINE CULTURE/COLONY COUNT: CPT | Performed by: EMERGENCY MEDICINE

## 2018-07-11 PROCEDURE — 74011000258 HC RX REV CODE- 258: Performed by: EMERGENCY MEDICINE

## 2018-07-11 PROCEDURE — 74011250636 HC RX REV CODE- 250/636: Performed by: EMERGENCY MEDICINE

## 2018-07-11 PROCEDURE — 96365 THER/PROPH/DIAG IV INF INIT: CPT

## 2018-07-11 PROCEDURE — 87186 SC STD MICRODIL/AGAR DIL: CPT | Performed by: EMERGENCY MEDICINE

## 2018-07-11 PROCEDURE — 80053 COMPREHEN METABOLIC PANEL: CPT | Performed by: EMERGENCY MEDICINE

## 2018-07-11 PROCEDURE — 85025 COMPLETE CBC W/AUTO DIFF WBC: CPT | Performed by: EMERGENCY MEDICINE

## 2018-07-11 PROCEDURE — 81001 URINALYSIS AUTO W/SCOPE: CPT | Performed by: EMERGENCY MEDICINE

## 2018-07-11 PROCEDURE — 87077 CULTURE AEROBIC IDENTIFY: CPT | Performed by: EMERGENCY MEDICINE

## 2018-07-11 PROCEDURE — 99284 EMERGENCY DEPT VISIT MOD MDM: CPT

## 2018-07-11 PROCEDURE — 36415 COLL VENOUS BLD VENIPUNCTURE: CPT | Performed by: EMERGENCY MEDICINE

## 2018-07-11 PROCEDURE — 96361 HYDRATE IV INFUSION ADD-ON: CPT

## 2018-07-11 PROCEDURE — 84484 ASSAY OF TROPONIN QUANT: CPT | Performed by: EMERGENCY MEDICINE

## 2018-07-11 PROCEDURE — 82550 ASSAY OF CK (CPK): CPT | Performed by: EMERGENCY MEDICINE

## 2018-07-11 PROCEDURE — 71046 X-RAY EXAM CHEST 2 VIEWS: CPT

## 2018-07-11 PROCEDURE — 93005 ELECTROCARDIOGRAM TRACING: CPT

## 2018-07-11 RX ORDER — CEPHALEXIN 500 MG/1
500 CAPSULE ORAL 2 TIMES DAILY
Qty: 8 CAP | Refills: 0 | Status: SHIPPED | OUTPATIENT
Start: 2018-07-11 | End: 2018-07-15

## 2018-07-11 RX ORDER — ONDANSETRON 4 MG/1
4 TABLET, ORALLY DISINTEGRATING ORAL
Qty: 12 TAB | Refills: 0 | Status: SHIPPED | OUTPATIENT
Start: 2018-07-11 | End: 2020-01-10

## 2018-07-11 RX ADMIN — CEFTRIAXONE SODIUM 1 G: 1 INJECTION, POWDER, FOR SOLUTION INTRAMUSCULAR; INTRAVENOUS at 18:24

## 2018-07-11 RX ADMIN — SODIUM CHLORIDE 1000 ML: 900 INJECTION, SOLUTION INTRAVENOUS at 17:32

## 2018-07-11 RX ADMIN — SODIUM CHLORIDE 1000 ML: 900 INJECTION, SOLUTION INTRAVENOUS at 15:19

## 2018-07-11 NOTE — TELEPHONE ENCOUNTER
Pt's family calling because he was seen 7/10/18 and had lab work done and was getting a little better.  Pt started to get worse last night and started to vomit and is now at the [facility] ED as of 7/11/18 3:00pm

## 2018-07-11 NOTE — ED NOTES
Bedside and Verbal shift change report given to [facility] (oncoming nurse) by Cecilia/Vickie (offgoing nurse). Report included the following information SBAR, ED Summary, MAR and Recent Results.

## 2018-07-11 NOTE — ED TRIAGE NOTES
Patient co fever and vomiting x 2 days, states he saw PCP yesterday and they janie blood but does not have results.

## 2018-07-11 NOTE — ED PROVIDER NOTES
HPI Comments: 75 y.o. male with past medical history significant for MI, stents, a-fib, CAD, and dyslipidemia, who presents ambulatory with friend to the ED with cc of fever. Pt reports fever x 3 days with associated chills, nausea, vomiting, and decreased appetite. He states he has had a fever of 101 and last felt febrile at ~12:35PM today. He notes he attempted to use Zofran but did not tolerate it d/t difficulty tolerating solid PO. Pt adds generalized body aches, including diffuse abdominal pain, chest pain, and extremity cramping. He notes the chest pain does not feel similar to hx of MI. He notes his last stress test was ~2 years ago. 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 Per friend, pt saw his PCP yesterday, who sent labs to evaluate kidney and liver function. Pt denies any recent tick bites. He specifically denies any diarrhea, decreased urine, dysuria, or hematuria. There are no other acute medical concerns at this time. Social Hx: denies Tobacco use, denies EtOH use, denies Illicit Drug use  PCP: Susy López MD  Cardiologist: Jeyson Delgadillo MD    Note written by Mel Martinez, as dictated by Vikash Friedman MD 2:50 PM      The history is provided by the patient and a friend. No  was used.         Past Medical History:   Diagnosis Date    Atrial fibrillation ([address] 03/09/2015    Had brief Afib 2015 that resolved on its own withrout obvious recurrence     Back problem     CAD (coronary artery disease)     MI in 2011 (RCA);  PCI to LAD 2015     Dyslipidemia 7/25/2011    Embedded metal fragments     chest    Enlarged prostate     MI (myocardial infarction) ([facility] [address]     MI in 2011       Past Surgical History:   Procedure Laterality Date    HX CORONARY STENT PLACEMENT      HX HEART CATHETERIZATION      x2    HX ORTHOPAEDIC      STRESS TEST CARDIOLITE           Family History:   Problem Relation Age of Onset    Heart Disease Mother     Heart Disease Father     Heart Disease Sister     Hypertension Sister     Heart Disease Brother     Asthma Brother        Social History     Social History    Marital status: LEGALLY      Spouse name: N/A    Number of children: N/A    Years of education: N/A     Occupational History    Not on file. Social History Main Topics    Smoking status: Never Smoker    Smokeless tobacco: Never Used    Alcohol use No    Drug use: No    Sexual activity: Yes     Partners: Female     Other Topics Concern    Not on file     Social History Narrative         ALLERGIES: Review of patient's allergies indicates no known allergies. Review of Systems   Constitutional: Positive for appetite change, chills and fever. Respiratory: Negative for shortness of breath. Cardiovascular: Positive for chest pain. Gastrointestinal: Positive for abdominal pain, nausea and vomiting. Negative for constipation and diarrhea. Genitourinary: Negative for decreased urine volume, dysuria and hematuria. Musculoskeletal: Positive for myalgias. Neurological: Negative for dizziness and light-headedness. All other systems reviewed and are negative. Vitals:    07/11/18 1445   BP: 91/54   Pulse: 90   Resp: 19   Temp: 99.4 °F (37.4 °C)   SpO2: 97%   Weight: 69.9 kg (154 lb)   Height: 5' 10\" (1.778 m)            Physical Exam   Constitutional: He appears well-developed. No distress. HENT:   Head: Normocephalic and atraumatic. Mouth/Throat: Mucous membranes are dry. Eyes: Pupils are equal, round, and reactive to light. No scleral icterus. Neck: Normal range of motion. Neck supple. Cardiovascular: Regular rhythm. Tachycardia present. Pulmonary/Chest: Effort normal and breath sounds normal.   Abdominal: Soft. He exhibits no distension. There is no tenderness. There is no rebound and no guarding. Musculoskeletal: Normal range of motion. Neurological: He is alert. Skin: Skin is warm and dry.  He is not diaphoretic. Psychiatric: He has a normal mood and affect. His behavior is normal. Thought content normal.   Nursing note and vitals reviewed. Note written by Mel Pinedo, as dictated by Miryam Zamora MD 2:50 PM      MDM  Number of Diagnoses or Management Options  Acute cystitis without hematuria: new and requires workup  Dehydration: new and requires workup  Diagnosis management comments: Pt presents with nausea, vomiting, dehydration, and decreased appetite. Evaluation significant for dehydration and UTI. The patient is resting comfortably and feels better, is alert and in no distress. The repeat examination is unremarkable and benign; in particular, there is no discomfort at McBurney's point. The history, exam, diagnostic testing, and current condition do not suggest acute appendicitis, bowel obstruction, incarcerated hernia, acute cholecystitis, bowel perforation, major gastrointestinal bleeding, severe diverticulitis, sepsis, or other significant pathology to warrant further testing, continued ED treatment, admission, or surgical evaluation at this point. The vital signs have been stable and are within normal limits at this time. The patient does not have uncontrollable pain, intractable vomiting, or other significant symptoms. The patient's condition is stable and appropriate for discharge. The patient will pursue further outpatient evaluation with the primary care physician or other designated or consulting physician as indicated in the discharge instructions. ED Course       Procedures    ED EKG interpretation:  NSR; rate 82; no ST/Twave changes; premature atrial complexes  Note written by Adrien Valiente, as dictated by Miryam Zamora MD 3:14 PM     6:13 PM  Pt is feeling better after fluids, went over results, will go home and f/u with PCP.    6:13 PM  Patient's results have been reviewed with them.   Patient and/or family have verbally conveyed their understanding and agreement of the patient's signs, symptoms, diagnosis, treatment and prognosis and additionally agree to follow up as recommended or return to the Emergency Room should their condition change prior to follow-up. Discharge instructions have also been provided to the patient with some educational information regarding their diagnosis as well a list of reasons why they would want to return to the ER prior to their follow-up appointment should their condition change.

## 2018-07-12 NOTE — TELEPHONE ENCOUNTER
Andriy Moon is calling back again and is asking if Dr. Reuben Marcus can possibly call her back today. Please call her at [phone]. Has some concerns about patient urinating a little bit of blood in urine today, was only given IV fluids in the ER, potassium running very low which appears to be messing with his heart.

## 2018-07-13 LAB
BACTERIA SPEC CULT: ABNORMAL
CC UR VC: ABNORMAL
SERVICE CMNT-IMP: ABNORMAL

## 2018-07-13 RX ORDER — POTASSIUM CHLORIDE 20 MEQ/1
20 TABLET, EXTENDED RELEASE ORAL 2 TIMES DAILY
Qty: 60 TAB | Refills: 2 | OUTPATIENT
Start: 2018-07-13 | End: 2020-01-10

## 2018-07-13 NOTE — TELEPHONE ENCOUNTER
mark needs a call back because stating that the potassium you get over the counter is not the correct amount for 40mg and would have to take 8 or more pills and needs a call back to discuss - pt at pharmacy

## 2018-07-19 LAB
BASOPHILS # BLD AUTO: 0 X10E3/UL (ref 0–0.2)
BASOPHILS NFR BLD AUTO: 0 %
BUN SERPL-MCNC: 16 MG/DL (ref 8–27)
BUN/CREAT SERPL: 16 (ref 10–24)
CALCIUM SERPL-MCNC: 9.2 MG/DL (ref 8.6–10.2)
CHLORIDE SERPL-SCNC: 101 MMOL/L (ref 96–106)
CO2 SERPL-SCNC: 24 MMOL/L (ref 20–29)
CREAT SERPL-MCNC: 1 MG/DL (ref 0.76–1.27)
EOSINOPHIL # BLD AUTO: 0 X10E3/UL (ref 0–0.4)
EOSINOPHIL NFR BLD AUTO: 1 %
ERYTHROCYTE [DISTWIDTH] IN BLOOD BY AUTOMATED COUNT: 13.4 % (ref 12.3–15.4)
GLUCOSE SERPL-MCNC: 112 MG/DL (ref 65–99)
HCT VFR BLD AUTO: 42 % (ref 37.5–51)
HGB BLD-MCNC: 13.5 G/DL (ref 13–17.7)
IMM GRANULOCYTES # BLD: 0.2 X10E3/UL (ref 0–0.1)
IMM GRANULOCYTES NFR BLD: 3 %
LYMPHOCYTES # BLD AUTO: 0.8 X10E3/UL (ref 0.7–3.1)
LYMPHOCYTES NFR BLD AUTO: 14 %
MCH RBC QN AUTO: 30.5 PG (ref 26.6–33)
MCHC RBC AUTO-ENTMCNC: 32.1 G/DL (ref 31.5–35.7)
MCV RBC AUTO: 95 FL (ref 79–97)
MONOCYTES # BLD AUTO: 0.6 X10E3/UL (ref 0.1–0.9)
MONOCYTES NFR BLD AUTO: 9 %
NEUTROPHILS # BLD AUTO: 4.4 X10E3/UL (ref 1.4–7)
NEUTROPHILS NFR BLD AUTO: 73 %
PLATELET # BLD AUTO: 421 X10E3/UL (ref 150–379)
POTASSIUM SERPL-SCNC: 4.6 MMOL/L (ref 3.5–5.2)
RBC # BLD AUTO: 4.42 X10E6/UL (ref 4.14–5.8)
SODIUM SERPL-SCNC: 141 MMOL/L (ref 134–144)
WBC # BLD AUTO: 6 X10E3/UL (ref 3.4–10.8)

## 2018-07-23 ENCOUNTER — TELEPHONE (OUTPATIENT)
Dept: FAMILY MEDICINE CLINIC | Age: 68
End: 2018-07-23

## 2018-09-17 ENCOUNTER — OFFICE VISIT (OUTPATIENT)
Dept: FAMILY MEDICINE CLINIC | Age: 68
End: 2018-09-17

## 2018-09-17 VITALS
WEIGHT: 151 LBS | BODY MASS INDEX: 21.62 KG/M2 | RESPIRATION RATE: 18 BRPM | DIASTOLIC BLOOD PRESSURE: 64 MMHG | TEMPERATURE: 98.6 F | SYSTOLIC BLOOD PRESSURE: 124 MMHG | HEART RATE: 78 BPM | OXYGEN SATURATION: 98 % | HEIGHT: 70 IN

## 2018-09-17 DIAGNOSIS — J06.9 URI WITH COUGH AND CONGESTION: Primary | ICD-10-CM

## 2018-09-17 RX ORDER — ZOSTER VACCINE RECOMBINANT, ADJUVANTED 50 MCG/0.5
KIT INTRAMUSCULAR
Refills: 0 | COMMUNITY
Start: 2018-09-13 | End: 2018-09-17 | Stop reason: ALTCHOICE

## 2018-09-17 RX ORDER — AZITHROMYCIN 250 MG/1
TABLET, FILM COATED ORAL
Qty: 6 TAB | Refills: 0 | Status: SHIPPED | OUTPATIENT
Start: 2018-09-17 | End: 2018-09-22

## 2018-09-17 NOTE — PROGRESS NOTES
Subjective:      Shellie Mariee is a 75 y.o. male here with complaint of cough described as productive of yellow sputum, sinus pressure and pain, shortness of breath, chest congestion, postnasal drainage for 3 days. He denies a history of chills, fevers, nausea and vomiting and denies a history of asthma. Patient does not smoke cigarettes. Evaluation to date: none   Treatment to date: none       Current Outpatient Prescriptions   Medication Sig Dispense Refill    potassium chloride (K-DUR, KLOR-CON) 20 mEq tablet Take 1 Tab by mouth two (2) times a day. 60 Tab 2    ondansetron (ZOFRAN ODT) 4 mg disintegrating tablet Take 1 Tab by mouth every eight (8) hours as needed for Nausea. 12 Tab 0    albuterol (PROVENTIL HFA, VENTOLIN HFA, PROAIR HFA) 90 mcg/actuation inhaler Take 2 Puffs by inhalation every four (4) hours as needed for Wheezing or Shortness of Breath. 1 Inhaler 2    EPINEPHrine (EPIPEN) 0.3 mg/0.3 mL injection 0.3 mL by IntraMUSCular route once as needed for up to 1 dose. Indications: Anaphylaxis 2 Syringe 0    cyclobenzaprine (FLEXERIL) 5 mg tablet TAKE 1 TABLET BY MOUTH ONCE DAILY AS NEEDED FOR MUSCLE SPASMS 30 Tab 1    silodosin (RAPAFLO) 8 mg capsule Take 8 mg by mouth every other day. No Known Allergies    Past Medical History:   Diagnosis Date    Atrial fibrillation ([facility] [address] 03/09/2015    Had brief Afib 2015 that resolved on its own withrout obvious recurrence     Back problem     CAD (coronary artery disease)     MI in 2011 (RCA);  PCI to LAD 2015     Dyslipidemia 7/25/2011    Embedded metal fragments     chest    Enlarged prostate     MI (myocardial infarction) ([facility] [address]     MI in 2011       Social History   Substance Use Topics    Smoking status: Never Smoker    Smokeless tobacco: Never Used    Alcohol use No        Review of Systems  Pertinent items are noted in HPI.      Objective:     Visit Vitals    /64 (BP 1 Location: Right arm, BP Patient Position: Sitting)  Comment: Manual    Pulse 78    Temp 98.6 °F (37 °C) (Oral)  Comment: .  Resp 18    Ht 5' 10\" (1.778 m)    Wt 151 lb (68.5 kg)    SpO2 98%    BMI 21.67 kg/m2      General appearance - alert, well appearing, and in no distress  Eyes - pupils equal and reactive, extraocular eye movements intact, sclera anicteric  Ears - bilateral TM's and external ear canals normal  Nose - mucosal congestion, mucosal erythema and sinus tenderness noted  Oropharyngx - mucous membranes moist, pharynx normal without lesions  Neck - bilateral symmetric anterior adenopathy  Chest - clear to auscultation, no wheezes, rales or rhonchi, symmetric air entry, no tachypnea, retractions or cyanosis  Heart - normal rate, regular rhythm, normal S1, S2, no murmurs, rubs, clicks or gallops    Assessment/Plan:   Solitario Eagle is a 75 y.o. male seen for:     1. URI with cough and congestion  - azithromycin (ZITHROMAX) 250 mg tablet; Take 2 tablets today, then take 1 tablet daily  Dispense: 6 Tab; Refill: 0  - Symptomatic therapy suggested: push fluids, rest and use cough suppressant of choice prn, Mucinex PRN    I have discussed the diagnosis with the patient and the intended plan as seen in the above orders. The patient has received an after-visit summary and questions were answered concerning future plans. I have discussed medication side effects and warnings with the patient as well. Patient verbalizes understanding of plan of care and denies further questions or concerns at this time. Informed patient to return to the office if symptoms worsen or if new symptoms arise. Follow-up Disposition:  Return if symptoms worsen or fail to improve.

## 2018-09-17 NOTE — MR AVS SNAPSHOT
[address] [facility] 
 
 
 [address] [facility] 
[phone] Patient: Chata Marie MRN: [id number] QAN:[id number] Visit Information Date & Time Provider Department Dept. Phone Encounter #  
 9/17/2018  9:45 AM Natali RoseGallo [phone] [id number] Follow-up Instructions Return if symptoms worsen or fail to improve. Your Appointments 1/11/2019  9:00 AM  
ESTABLISHED PATIENT with Katrin Borjas MD  
[facility] ([facility]) Appt Note: annual  
 [address] [facility] [place] [address] [facility]  
[address] [facility] [place] [address] [address] Upcoming Health Maintenance Date Due COLONOSCOPY 11/1/1968 GLAUCOMA SCREENING Q2Y 7/28/2017 Influenza Age 5 to Adult 10/1/2018* MEDICARE YEARLY EXAM 7/11/2019 DTaP/Tdap/Td series (3 - Td) 8/19/2025 *Topic was postponed. The date shown is not the original due date. Allergies as of 9/17/2018  Review Complete On: 9/17/2018 By: Natali Rose MD  
 No Known Allergies Current Immunizations  Reviewed on 9/17/2018 Name Date Influenza High Dose Vaccine PF 11/9/2017, 9/28/2015 Pneumococcal Conjugate (PCV-13) 8/19/2015 Pneumococcal Polysaccharide (PPSV-23) 3/30/2017 10:50 AM  
 Tdap 8/19/2015, 8/22/2011 Zoster Vaccine, Live 4/22/2010 Reviewed by Natali Rose MD on 9/17/2018 at 10:25 AM  
You Were Diagnosed With   
  
 Codes Comments URI with cough and congestion    -  Primary ICD-10-CM: J06.9 ICD-9-CM: 465.9 Vitals BP Pulse Temp Resp Height(growth percentile) Weight(growth percentile) 124/64 (BP 1 Location: Right arm, BP Patient Position: Sitting) 78 98.6 °F (37 °C) (Oral) 18 5' 10\" (1.778 m) 151 lb (68.5 kg) SpO2 BMI Smoking Status 98% 21.67 kg/m2 Never Smoker Vitals History BMI and BSA Data Body Mass Index Body Surface Area  
 21.67 kg/m 2 1.84 m 2 Preferred Pharmacy Pharmacy Name Phone [address] [facility] [phone] Your Updated Medication List  
  
   
This list is accurate as of 9/17/18 10:42 AM.  Always use your most recent med list.  
  
  
  
  
 albuterol 90 mcg/actuation inhaler Commonly known as:  PROVENTIL HFA, VENTOLIN HFA, PROAIR HFA Take 2 Puffs by inhalation every four (4) hours as needed for Wheezing or Shortness of Breath. azithromycin 250 mg tablet Commonly known as:  Bennetta Plum Take 2 tablets today, then take 1 tablet daily  
  
 cyclobenzaprine 5 mg tablet Commonly known as:  FLEXERIL  
TAKE 1 TABLET BY MOUTH ONCE DAILY AS NEEDED FOR MUSCLE SPASMS EPINEPHrine 0.3 mg/0.3 mL injection Commonly known as:  EPIPEN  
0.3 mL by IntraMUSCular route once as needed for up to 1 dose. Indications: Anaphylaxis  
  
 ondansetron 4 mg disintegrating tablet Commonly known as:  ZOFRAN ODT Take 1 Tab by mouth every eight (8) hours as needed for Nausea. potassium chloride 20 mEq tablet Commonly known as:  K-DUR, KLOR-CON Take 1 Tab by mouth two (2) times a day. silodosin 8 mg capsule Commonly known as:  RAPAFLO Take 8 mg by mouth every other day. Prescriptions Sent to Pharmacy Refills  
 azithromycin (ZITHROMAX) 250 mg tablet 0 Sig: Take 2 tablets today, then take 1 tablet daily Class: Normal  
 Pharmacy: [place] [address] [address]  #: [phone] Follow-up Instructions Return if symptoms worsen or fail to improve. Patient Instructions Upper Respiratory Infection (Cold): Care Instructions Your Care Instructions An upper respiratory infection, or URI, is an infection of the nose, sinuses, or throat.  URIs are spread by coughs, sneezes, and direct contact. The common cold is the most frequent kind of URI. The flu and sinus infections are other kinds of URIs. Almost all URIs are caused by viruses. Antibiotics won't cure them. But you can treat most infections with home care. This may include drinking lots of fluids and taking over-the-counter pain medicine. You will probably feel better in 4 to 10 days. The doctor has checked you carefully, but problems can develop later. If you notice any problems or new symptoms, get medical treatment right away. Follow-up care is a key part of your treatment and safety. Be sure to make and go to all appointments, and call your doctor if you are having problems. It's also a good idea to know your test results and keep a list of the medicines you take. How can you care for yourself at home? · To prevent dehydration, drink plenty of fluids, enough so that your urine is light yellow or clear like water. Choose water and other caffeine-free clear liquids until you feel better. If you have kidney, heart, or liver disease and have to limit fluids, talk with your doctor before you increase the amount of fluids you drink. · Take an over-the-counter pain medicine, such as acetaminophen (Tylenol), ibuprofen (Advil, Motrin), or naproxen (Aleve). Read and follow all instructions on the label. · Before you use cough and cold medicines, check the label. These medicines may not be safe for young children or for people with certain health problems. · Be careful when taking over-the-counter cold or flu medicines and Tylenol at the same time. Many of these medicines have acetaminophen, which is Tylenol. Read the labels to make sure that you are not taking more than the recommended dose. Too much acetaminophen (Tylenol) can be harmful. · Get plenty of rest. 
· Do not smoke or allow others to smoke around you. If you need help quitting, talk to your doctor about stop-smoking programs and medicines. These can increase your chances of quitting for good. When should you call for help? Call 911 anytime you think you may need emergency care. For example, call if: 
  · You have severe trouble breathing.  
 Call your doctor now or seek immediate medical care if: 
  · You seem to be getting much sicker.  
  · You have new or worse trouble breathing.  
  · You have a new or higher fever.  
  · You have a new rash.  
 Watch closely for changes in your health, and be sure to contact your doctor if: 
  · You have a new symptom, such as a sore throat, an earache, or sinus pain.  
  · You cough more deeply or more often, especially if you notice more mucus or a change in the color of your mucus.  
  · You do not get better as expected. Where can you learn more? Go to http://argentina-hasmukh.info/. Enter Q059 in the search box to learn more about \"Upper Respiratory Infection (Cold): Care Instructions. \" Current as of: December 6, 2017 Content Version: 11.7 © 5453-3116 [employer] Care instructions adapted under license by [employer] / [employer] (which disclaims liability or warranty for this information). If you have questions about a medical condition or this instruction, always ask your healthcare professional. [address] any warranty or liability for your use of this information. Introducing [facility]! [facility] introduces [employer] patient portal. Now you can access parts of your medical record, email your doctor's office, and request medication refills online. 1. In your internet browser, go to [email or web address] 2. Click on the First Time User? Click Here link in the Sign In box. You will see the New Member Sign Up page. 3. Enter your [employer] Access Code exactly as it appears below. You will not need to use this code after youve completed the sign-up process.  If you do not sign up before the expiration date, you must request a new code. · Bugsnag Access Code: [email or web address] Expires: 10/8/2018  9:48 AM 
 
4. Enter the last four digits of your Social Security Number (xxxx) and Date of Birth (mm/dd/yyyy) as indicated and click Submit. You will be taken to the next sign-up page. 5. Create a Bugsnag ID. This will be your Bugsnag login ID and cannot be changed, so think of one that is secure and easy to remember. 6. Create a Bug[employer]ag password. You can change your password at any time. 7. Enter your Password Reset Question and Answer. This can be used at a later time if you forget your password. 8. Enter your e-mail address. You will receive e-mail notification when new information is available in [address] 9. Click Sign Up. You can now view and download portions of your medical record. 10. Click the Download Summary menu link to download a portable copy of your medical information. If you have questions, please visit the Frequently Asked Questions section of the Bug[employer] website. Remember, Bug[employer]ag is NOT to be used for urgent needs. For medical emergencies, dial 911. Now available from your iPhone and Android! Please provide this summary of care documentation to your next provider. Your primary care clinician is listed as Alec Ronquillo. If you have any questions after today's visit, please call [phone].

## 2018-09-17 NOTE — PATIENT INSTRUCTIONS

## 2018-09-17 NOTE — PROGRESS NOTES
Identified pt with two pt identifiers(name and ). Chief Complaint   Patient presents with    Nasal Congestion     Symptoms began friday    Cough        Health Maintenance Due   Topic    COLONOSCOPY     GLAUCOMA SCREENING Q2Y        Wt Readings from Last 3 Encounters:   18 151 lb (68.5 kg)   18 154 lb (69.9 kg)   07/10/18 149 lb (67.6 kg)     Temp Readings from Last 3 Encounters:   18 98.6 °F (37 °C) (Oral)   18 99.4 °F (37.4 °C)   07/10/18 98.8 °F (37.1 °C) (Oral)     BP Readings from Last 3 Encounters:   18 124/64   18 110/90   07/10/18 102/58     Pulse Readings from Last 3 Encounters:   18 78   18 90   07/10/18 89         Learning Assessment:  :     Learning Assessment 10/27/2015 2015   PRIMARY LEARNER Patient Patient   HIGHEST LEVEL OF EDUCATION - PRIMARY LEARNER  GRADUATED HIGH SCHOOL OR GED -   BARRIERS PRIMARY LEARNER NONE -   CO-LEARNER CAREGIVER No -   PRIMARY LANGUAGE ENGLISH ENGLISH   LEARNER PREFERENCE PRIMARY OTHER (COMMENT) LISTENING   ANSWERED BY self patient   RELATIONSHIP SELF SELF       Depression Screening:  :     PHQ over the last two weeks 7/10/2018   Little interest or pleasure in doing things Not at all   Feeling down, depressed, irritable, or hopeless Not at all   Total Score PHQ 2 0       Fall Risk Assessment:  :     Fall Risk Assessment, last 12 mths 7/10/2018   Able to walk? Yes   Fall in past 12 months? No       Abuse Screening:  :     Abuse Screening Questionnaire 7/10/2018 12/15/2017 10/27/2015   Do you ever feel afraid of your partner? N N N   Are you in a relationship with someone who physically or mentally threatens you? N N N   Is it safe for you to go home? Gracie Lockwood       Coordination of Care Questionnaire:  :     1) Have you been to an emergency room, urgent care clinic since your last visit?  Yes [facility]   Hospitalized since your last visit? no             2) Have you seen or consulted any other health care providers outside of [facility] since your last visit? no  (Include any pap smears or colon screenings in this section.)    3) Do you have an Advance Directive on file? no  Are you interested in receiving information about Advance Directives? no    Reviewed record in preparation for visit and have obtained necessary documentation. Medication reconciliation up to date and corrected with patient at this time.

## 2019-07-02 ENCOUNTER — DOCUMENTATION ONLY (OUTPATIENT)
Dept: INTERNAL MEDICINE CLINIC | Age: 69
End: 2019-07-02

## 2019-12-18 ENCOUNTER — TELEPHONE (OUTPATIENT)
Dept: FAMILY MEDICINE CLINIC | Age: 69
End: 2019-12-18

## 2019-12-18 NOTE — TELEPHONE ENCOUNTER
----- Message from Shalini Metcalf sent at 12/18/2019  3:29 PM EST -----  Regarding: Dr. Kendrick Suresh: [phone]  Caller's first and last name and relationship to patient (if not the patient): Henok Shah (Spouse)  Best contact number:  [phone]  Preferred date and time: Before Jan 1st   Scheduled appointment date and time: n/a  Reason for appointment: Pt needs labs done.    Details to clarify the request: Cardiologist Dr. Radha Holley prescribed the pt a Fasting Lipid Panel to be done before Jan 3rd    Additional details: n/a

## 2020-01-10 ENCOUNTER — HOSPITAL ENCOUNTER (OUTPATIENT)
Dept: LAB | Age: 70
Discharge: HOME | End: 2020-01-10

## 2020-01-10 ENCOUNTER — OFFICE VISIT (OUTPATIENT)
Dept: FAMILY MEDICINE CLINIC | Age: 70
End: 2020-01-10

## 2020-01-10 VITALS
OXYGEN SATURATION: 97 % | RESPIRATION RATE: 18 BRPM | HEIGHT: 70 IN | BODY MASS INDEX: 21.47 KG/M2 | TEMPERATURE: 97.5 F | WEIGHT: 150 LBS | HEART RATE: 67 BPM | SYSTOLIC BLOOD PRESSURE: 128 MMHG | DIASTOLIC BLOOD PRESSURE: 78 MMHG

## 2020-01-10 DIAGNOSIS — Z12.11 SCREENING FOR MALIGNANT NEOPLASM OF COLON: ICD-10-CM

## 2020-01-10 DIAGNOSIS — Z00.00 ENCOUNTER FOR MEDICARE ANNUAL WELLNESS EXAM: Primary | ICD-10-CM

## 2020-01-10 DIAGNOSIS — E78.5 DYSLIPIDEMIA: ICD-10-CM

## 2020-01-10 DIAGNOSIS — I48.0 PAF (PAROXYSMAL ATRIAL FIBRILLATION) (HCC): ICD-10-CM

## 2020-01-10 DIAGNOSIS — I25.10 CORONARY ARTERY DISEASE INVOLVING NATIVE CORONARY ARTERY OF NATIVE HEART WITHOUT ANGINA PECTORIS: ICD-10-CM

## 2020-01-10 DIAGNOSIS — J06.9 URI WITH COUGH AND CONGESTION: ICD-10-CM

## 2020-01-10 DIAGNOSIS — D48.5 NEOPLASM OF UNCERTAIN BEHAVIOR OF SKIN OF EAR: ICD-10-CM

## 2020-01-10 DIAGNOSIS — M62.838 MUSCLE SPASM: ICD-10-CM

## 2020-01-10 DIAGNOSIS — Z91.030 BEE STING ALLERGY: ICD-10-CM

## 2020-01-10 LAB
ALBUMIN SERPL-MCNC: 4.2 G/DL (ref 3.5–5)
ALBUMIN/GLOB SERPL: 1.4 {RATIO} (ref 1.1–2.2)
ALP SERPL-CCNC: 69 U/L (ref 45–117)
ALT SERPL-CCNC: 28 U/L (ref 12–78)
ANION GAP SERPL CALC-SCNC: 3 MMOL/L (ref 5–15)
AST SERPL-CCNC: 20 U/L (ref 15–37)
BILIRUB SERPL-MCNC: 0.6 MG/DL (ref 0.2–1)
BUN SERPL-MCNC: 18 MG/DL (ref 6–20)
BUN/CREAT SERPL: 17 (ref 12–20)
CALCIUM SERPL-MCNC: 9.3 MG/DL (ref 8.5–10.1)
CHLORIDE SERPL-SCNC: 105 MMOL/L (ref 97–108)
CHOLEST SERPL-MCNC: 247 MG/DL
CO2 SERPL-SCNC: 31 MMOL/L (ref 21–32)
CREAT SERPL-MCNC: 1.05 MG/DL (ref 0.7–1.3)
ERYTHROCYTE [DISTWIDTH] IN BLOOD BY AUTOMATED COUNT: 12.6 % (ref 11.5–14.5)
GLOBULIN SER CALC-MCNC: 3 G/DL (ref 2–4)
GLUCOSE SERPL-MCNC: 99 MG/DL (ref 65–100)
HCT VFR BLD AUTO: 48.5 % (ref 36.6–50.3)
HDLC SERPL-MCNC: 56 MG/DL
HDLC SERPL: 4.4 {RATIO} (ref 0–5)
HGB BLD-MCNC: 15.2 G/DL (ref 12.1–17)
LDLC SERPL CALC-MCNC: 170 MG/DL (ref 0–100)
LIPID PROFILE,FLP: ABNORMAL
MCH RBC QN AUTO: 31.5 PG (ref 26–34)
MCHC RBC AUTO-ENTMCNC: 31.3 G/DL (ref 30–36.5)
MCV RBC AUTO: 100.4 FL (ref 80–99)
NRBC # BLD: 0 K/UL (ref 0–0.01)
NRBC BLD-RTO: 0 PER 100 WBC
PLATELET # BLD AUTO: 249 K/UL (ref 150–400)
PMV BLD AUTO: 9.9 FL (ref 8.9–12.9)
POTASSIUM SERPL-SCNC: 4.6 MMOL/L (ref 3.5–5.1)
PROT SERPL-MCNC: 7.2 G/DL (ref 6.4–8.2)
RBC # BLD AUTO: 4.83 M/UL (ref 4.1–5.7)
SODIUM SERPL-SCNC: 139 MMOL/L (ref 136–145)
TRIGL SERPL-MCNC: 105 MG/DL (ref ?–150)
VLDLC SERPL CALC-MCNC: 21 MG/DL
WBC # BLD AUTO: 6.4 K/UL (ref 4.1–11.1)

## 2020-01-10 RX ORDER — CYCLOBENZAPRINE HCL 5 MG
TABLET ORAL
Qty: 30 TAB | Refills: 5 | Status: SHIPPED | OUTPATIENT
Start: 2020-01-10 | End: 2021-07-12

## 2020-01-10 RX ORDER — ALBUTEROL SULFATE 90 UG/1
2 AEROSOL, METERED RESPIRATORY (INHALATION)
Qty: 1 INHALER | Refills: 2 | Status: SHIPPED | OUTPATIENT
Start: 2020-01-10 | End: 2022-01-25 | Stop reason: SDUPTHER

## 2020-01-10 RX ORDER — EPINEPHRINE 0.3 MG/.3ML
0.3 INJECTION SUBCUTANEOUS
Qty: 2 SYRINGE | Refills: 1 | Status: SHIPPED | OUTPATIENT
Start: 2020-01-10 | End: 2020-01-10

## 2020-01-10 NOTE — PROGRESS NOTES
This is the Subsequent Medicare Annual Wellness Exam, performed 12 months or more after the Initial AWV or the last Subsequent AWV    I have reviewed the patient's medical history in detail and updated the computerized patient record. History     Patient Active Problem List   Diagnosis Code    Coronary artery disease I25.10    Dyslipidemia E78.5    PAF (paroxysmal atrial fibrillation) ([facility] [address] I48.0    Dyspepsia R10.13     Past Medical History:   Diagnosis Date    Atrial fibrillation ([facility] [address] 03/09/2015    Had brief Afib 2015 that resolved on its own withrout obvious recurrence     Back problem     CAD (coronary artery disease)     MI in 2011 (RCA);  PCI to LAD 2015     Dyslipidemia 7/25/2011    Embedded metal fragments     chest    Enlarged prostate     MI (myocardial infarction) ([facility] [address]     MI in 2011      Past Surgical History:   Procedure Laterality Date    HX CORONARY STENT PLACEMENT      HX HEART CATHETERIZATION      x2    HX ORTHOPAEDIC      STRESS TEST CARDIOLITE       Current Outpatient Medications   Medication Sig Dispense Refill    MEN'S MULTI-VITAMIN PO Take  by mouth.  cannabidiol, CBD, extract 100 mg/mL soln Take  by mouth two (2) times a day.  ubidecarenone/vitamin E mixed (COQ10  PO) Take  by mouth.  albuterol (PROVENTIL HFA, VENTOLIN HFA, PROAIR HFA) 90 mcg/actuation inhaler Take 2 Puffs by inhalation every four (4) hours as needed for Wheezing or Shortness of Breath. 1 Inhaler 2    EPINEPHrine (EPIPEN) 0.3 mg/0.3 mL injection 0.3 mL by IntraMUSCular route once as needed for up to 1 dose. Indications: Anaphylaxis 2 Syringe 0    cyclobenzaprine (FLEXERIL) 5 mg tablet TAKE 1 TABLET BY MOUTH ONCE DAILY AS NEEDED FOR MUSCLE SPASMS 30 Tab 1    silodosin (RAPAFLO) 8 mg capsule Take 8 mg by mouth every other day.        No Known Allergies    Family History   Problem Relation Age of Onset    Heart Disease Mother     Heart Disease Father     Heart Disease Sister    Marlon Hypertension Sister     Heart Disease Brother     Asthma Brother      Social History     Tobacco Use    Smoking status: Never Smoker    Smokeless tobacco: Never Used   Substance Use Topics    Alcohol use: No       Depression Risk Factor Screening:     3 most recent PHQ Screens 1/10/2020   Little interest or pleasure in doing things Not at all   Feeling down, depressed, irritable, or hopeless Not at all   Total Score PHQ 2 0       Alcohol Risk Factor Screening (MALE > 65): Do you average more 1 drink per night or more than 7 drinks a week: No    In the past three months have you have had more than 4 drinks containing alcohol on one occasion: No      Functional Ability and Level of Safety:   Hearing: Hearing is good. Activities of Daily Living: The home contains: no safety equipment. ADL Assessment 1/10/2020   Feeding yourself No Help Needed   Getting from bed to chair No Help Needed   Getting dressed No Help Needed   Bathing or showering No Help Needed   Walk across the room (includes cane/walker) No Help Needed   Using the telphone No Help Needed   Taking your medications No Help Needed   Preparing meals No Help Needed   Managing money (expenses/bills) No Help Needed   Moderately strenuous housework (laundry) No Help Needed   Shopping for personal items (toiletries/medicines) No Help Needed   Shopping for groceries No Help Needed   Driving No Help Needed   Climbing a flight of stairs No Help Needed   Getting to places beyond walking distances No Help Needed       Ambulation: with no difficulty    Fall Risk:  Fall Risk Assessment, last 12 mths 1/10/2020   Able to walk? Yes   Fall in past 12 months? No       Abuse Screen:   Abuse Screening Questionnaire 1/10/2020   Do you ever feel afraid of your partner? N   Are you in a relationship with someone who physically or mentally threatens you? N   Is it safe for you to go home?  Y       Cognitive Screening   Has your family/caregiver stated any concerns about your memory: no  Cognitive Screening: Normal     Patient Care Team   Patient Care Team:  Vesta Ragsdale MD as PCP - General (Family Practice)  Vesta Ragsdale MD as PCP - [facility] Empaneled Provider  Sarahy Kline MD (Urology)  Yuliet Dickson MD as Physician (Cardiology)    Assessment/Plan   Education and counseling provided:  Prostate cancer screening tests (PSA, covered annually)  Colorectal cancer screening tests  Screening for glaucoma    Diagnoses and all orders for this visit:    1. Encounter for Medicare annual wellness exam    2. PAF (paroxysmal atrial fibrillation) ([facility] [address]    3. Coronary artery disease involving native coronary artery of native heart without angina pectoris  -     METABOLIC PANEL, COMPREHENSIVE; Future  -     LIPID PANEL; Future  -     CBC W/O DIFF; Future    4. Dyslipidemia  -     METABOLIC PANEL, COMPREHENSIVE; Future  -     LIPID PANEL; Future    5. URI with cough and congestion  -     albuterol (PROVENTIL HFA, VENTOLIN HFA, PROAIR HFA) 90 mcg/actuation inhaler; Take 2 Puffs by inhalation every four (4) hours as needed for Wheezing or Shortness of Breath. 6. Bee sting allergy  -     EPINEPHrine (EPIPEN) 0.3 mg/0.3 mL injection; 0.3 mL by IntraMUSCular route once as needed for Anaphylaxis for up to 1 dose. Indications: a significant type of allergic reaction called anaphylaxis    7.  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 Muscle spasm  -     cyclobenzaprine (FLEXERIL) 5 mg tablet; TAKE 1 TABLET BY MOUTH ONCE DAILY AS NEEDED FOR MUSCLE SPASMS    9. Screening for malignant neoplasm of colon  -     REFERRAL TO GASTROENTEROLOGY        Health Maintenance Due   Topic Date Due    COLONOSCOPY  11/01/1968    GLAUCOMA SCREENING Q2Y  07/28/2017    MEDICARE YEARLY EXAM  07/11/2019

## 2020-01-10 NOTE — LETTER
1/10/2020 9:36 AM 
 
Mr. Najma Zeng [facility] [phone] To Whom It May Concern,  
 
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 Sincerely, Lisa Pagan MD

## 2020-01-10 NOTE — PROGRESS NOTES
Identified pt with two pt identifiers(name and ). Chief Complaint   Patient presents with    Annual Wellness Visit     [address]    Letter for School/Work     needs letter for michaele for CDL    Labs     patient is fasting        Health Maintenance Due   Topic    COLONOSCOPY     GLAUCOMA SCREENING Q2Y     MEDICARE YEARLY EXAM        Wt Readings from Last 3 Encounters:   01/10/20 150 lb (68 kg)   18 151 lb (68.5 kg)   18 154 lb (69.9 kg)     Temp Readings from Last 3 Encounters:   01/10/20 97.5 °F (36.4 °C) (Oral)   18 98.6 °F (37 °C) (Oral)   18 99.4 °F (37.4 °C)     BP Readings from Last 3 Encounters:   01/10/20 128/78   18 124/64   18 110/90     Pulse Readings from Last 3 Encounters:   01/10/20 67   18 78   18 90         Learning Assessment:  :     Learning Assessment 10/27/2015 2015   PRIMARY LEARNER Patient Patient   HIGHEST LEVEL OF EDUCATION - PRIMARY LEARNER  GRADUATED HIGH SCHOOL OR GED -   BARRIERS PRIMARY LEARNER NONE -   Bob Ortega CAREGIVER No -   PRIMARY LANGUAGE ENGLISH ENGLISH   LEARNER PREFERENCE PRIMARY OTHER (COMMENT) LISTENING   ANSWERED BY self patient   RELATIONSHIP SELF SELF       Depression Screening:  :     3 most recent PHQ Screens 1/10/2020   Little interest or pleasure in doing things Not at all   Feeling down, depressed, irritable, or hopeless Not at all   Total Score PHQ 2 0       Fall Risk Assessment:     Fall Risk Assessment, last 12 mths 1/10/2020   Able to walk? Yes   Fall in past 12 months? No       Abuse Screening:     Abuse Screening Questionnaire 1/10/2020 7/10/2018 12/15/2017 10/27/2015   Do you ever feel afraid of your partner? N N N N   Are you in a relationship with someone who physically or mentally threatens you? N N N N   Is it safe for you to go home?  Y Y Y Y       Coordination of Care Questionnaire:  :     1) Have you been to an emergency room, urgent care clinic since your last visit? no   Hospitalized since your last visit? no             2) Have you seen or consulted any other health care providers outside of [address] [place] [address] since your last visit? no  (Include any pap smears or colon screenings in this section.)    3) Do you have an Advance Directive on file? no  Are you interested in receiving information about Advance Directives? no    Reviewed record in preparation for visit and have obtained necessary documentation. Medication reconciliation up to date and corrected with patient at this time.

## 2020-01-10 NOTE — ACP (ADVANCE CARE PLANNING)
Advance Care Planning (ACP) Provider Conversation Snapshot    Date of ACP Conversation: 01/10/20  Persons included in Conversation:  patient and POA  Length of ACP Conversation in minutes:  <16 minutes (Non-Billable)    Authorized Decision Maker (if patient is incapable of making informed decisions): This person is:   Healthcare Agent/Medical Power of  under Advance Directive - Adria Arbour         For Patients with Decision Making Capacity:   Values/Goals: Exploration of values, goals, and preferences if recovery is not expected, even with continued medical treatment in the event of:  Imminent death  Severe, permanent brain injury  \"In these circumstances, what matters most to you? \"  Care focused more on comfort or quality of life.     Conversation Outcomes / Follow-Up Plan:   Recommended review of completed ACP document annually or upon change in health status

## 2020-01-10 NOTE — PROGRESS NOTES
Subjective:      Dennis Rinne is a 78 y.o. male here for follow up. He reports that he has been doing well. Fasting for labs. Would like medication refills. He is due for CDL exam and is requesting note from all his providers that he is healthy to proceed with evaluation. His significant other reports a lesion behind the right ear which has been present for at least a year or more. States that area will become inflamed and then appears to get better. Intermittently bothersome. He has not had evaluated in the past.     Current Outpatient Medications   Medication Sig Dispense Refill    MEN'S MULTI-VITAMIN PO Take  by mouth.  cannabidiol, CBD, extract 100 mg/mL soln Take  by mouth two (2) times a day.  ubidecarenone/vitamin E mixed (COQ10  PO) Take  by mouth.  albuterol (PROVENTIL HFA, VENTOLIN HFA, PROAIR HFA) 90 mcg/actuation inhaler Take 2 Puffs by inhalation every four (4) hours as needed for Wheezing or Shortness of Breath. 1 Inhaler 2    EPINEPHrine (EPIPEN) 0.3 mg/0.3 mL injection 0.3 mL by IntraMUSCular route once as needed for up to 1 dose. Indications: Anaphylaxis 2 Syringe 0    cyclobenzaprine (FLEXERIL) 5 mg tablet TAKE 1 TABLET BY MOUTH ONCE DAILY AS NEEDED FOR MUSCLE SPASMS 30 Tab 1    silodosin (RAPAFLO) 8 mg capsule Take 8 mg by mouth every other day.          No Known Allergies    Past Medical History:   Diagnosis Date    Atrial fibrillation ([facility] [address] 03/09/2015    Had brief Afib 2015 that resolved on its own withrout obvious recurrence     Back problem     CAD (coronary artery disease)     MI in 2011 (RCA);  PCI to LAD 2015     Dyslipidemia 7/25/2011    Embedded metal fragments     chest    Enlarged prostate     MI (myocardial infarction) ([facility] [address]     MI in 2011       Social History     Tobacco Use    Smoking status: Never Smoker    Smokeless tobacco: Never Used   Substance Use Topics    Alcohol use: No        Review of Systems  Constitutional: negative for fevers and chills  Eyes: negative for visual disturbance and irritation  Ears, nose, mouth, throat, and face: negative for nasal congestion and sore throat  Respiratory: negative for cough, sputum or dyspnea on exertion  Cardiovascular: negative for chest pain, chest pressure/discomfort, palpitations, irregular heart beats, lower extremity edema  Gastrointestinal: negative for nausea, vomiting, change in bowel habits and abdominal pain       Objective:     Visit Vitals  /78 (BP 1 Location: Right arm, BP Patient Position: Sitting) Comment: Manual   Pulse 67   Temp 97.5 °F (36.4 °C) (Oral) Comment: .   Resp 18   Ht 5' 10\" (1.778 m)   Wt 150 lb (68 kg)   SpO2 97%   BMI 21.52 kg/m²      General appearance - alert, well appearing, and in no distress  Eyes - pupils equal and reactive, extraocular eye movements intact, sclera anicteric  Oropharyngx - mucous membranes moist, pharynx normal without lesions  Neck - supple, no significant adenopathy, carotids upstroke normal bilaterally, no bruits  Chest - clear to auscultation, no wheezes, rales or rhonchi, symmetric air entry, no tachypnea, retractions or cyanosis  Heart - normal rate, regular rhythm, normal S1, S2, no murmurs, rubs, clicks or gallops  Abdomen - soft, nontender, nondistended, no masses or organomegaly, bowel sounds normal  Neurological - alert, oriented, normal speech, no focal findings or movement disorder noted  Extremities - peripheral pulses normal, no pedal edema, no clubbing or cyanosis  Skin - raised lesion with central ulceration behind the right ear with rolled borders     Assessment/Plan:   Ivette Marte is a 78 y.o. male seen for:     1. Encounter for Medicare annual wellness exam    2. PAF (paroxysmal atrial fibrillation) ([facility] [address]): managed by Dr. Raquel Branch. 3. Coronary artery disease involving native coronary artery of native heart without angina pectoris: fasting labs ordered, will forward results to Dr. Raquel Branch.    - METABOLIC PANEL, COMPREHENSIVE; Future  - LIPID PANEL; Future  - CBC W/O DIFF; Future    4. Dyslipidemia  - METABOLIC PANEL, COMPREHENSIVE; Future  - LIPID PANEL; Future    5. URI with cough and congestion  - albuterol (PROVENTIL HFA, VENTOLIN HFA, PROAIR HFA) 90 mcg/actuation inhaler; Take 2 Puffs by inhalation every four (4) hours as needed for Wheezing or Shortness of Breath. Dispense: 1 Inhaler; Refill: 2    6. Bee sting allergy  - EPINEPHrine (EPIPEN) 0.3 mg/0.3 mL injection; 0.3 mL by IntraMUSCular route once as needed for Anaphylaxis for up to 1 dose. Indications: a significant type of allergic reaction called anaphylaxis  Dispense: 2 Syringe; Refill: 1    7. Neoplasm of uncertain behavior of skin of ear: concern for [facility], referred to Dermatology for further evaluation.   - REFERRAL TO DERMATOLOGY    8. Muscle spasm  - cyclobenzaprine (FLEXERIL) 5 mg tablet; TAKE 1 TABLET BY MOUTH ONCE DAILY AS NEEDED FOR MUSCLE SPASMS  Dispense: 30 Tab; Refill: 5    9. Screening for malignant neoplasm of colon  - REFERRAL TO GASTROENTEROLOGY    I have discussed the diagnosis with the patient and the intended plan as seen in the above orders. The patient has received an after-visit summary and questions were answered concerning future plans. I have discussed medication side effects and warnings with the patient as well. Patient verbalizes understanding of plan of care and denies further questions or concerns at this time. Informed patient to return to the office if symptoms worsen or if new symptoms arise. Follow-up and Dispositions    · Return in about 1 year (around 1/10/2021) for annual wellness visit or sooner as needed.

## 2020-01-10 NOTE — PATIENT INSTRUCTIONS
Medicare Wellness Visit, Male    The best way to live healthy is to have a lifestyle where you eat a well-balanced diet, exercise regularly, limit alcohol use, and quit all forms of tobacco/nicotine, if applicable. Regular preventive services are another way to keep healthy. Preventive services (vaccines, screening tests, monitoring & exams) can help personalize your care plan, which helps you manage your own care. Screening tests can find health problems at the earliest stages, when they are easiest to treat. [facility] follows the current, evidence-based guidelines published by the [facility] [place] ([facility]) when recommending preventive services for our patients. Because we follow these guidelines, sometimes recommendations change over time as research supports it. (For example, a prostate screening blood test is no longer routinely recommended for men with no symptoms). Of course, you and your doctor may decide to screen more often for some diseases, based on your risk and co-morbidities (chronic disease you are already diagnosed with). Preventive services for you include:  - Medicare offers their members a free annual wellness visit, which is time for you and your primary care provider to discuss and plan for your preventive service needs. Take advantage of this benefit every year!  -All adults over age 72 should receive the recommended pneumonia vaccines. Current USPSTF guidelines recommend a series of two vaccines for the best pneumonia protection.   -All adults should have a flu vaccine yearly and tetanus vaccine every 10 years.  -All adults age 48 and older should receive the shingles vaccines (series of two vaccines).        -All adults age 38-68 who are overweight should have a diabetes screening test once every three years.   -Other screening tests & preventive services for persons with diabetes include: an eye exam to screen for diabetic retinopathy, a kidney function test, a foot exam, and stricter control over your cholesterol.   -Cardiovascular screening for adults with routine risk involves an electrocardiogram (ECG) at intervals determined by the provider.   -Colorectal cancer screening should be done for adults age 54-65 with no increased risk factors for colorectal cancer. There are a number of acceptable methods of screening for this type of cancer. Each test has its own benefits and drawbacks. Discuss with your provider what is most appropriate for you during your annual wellness visit. The different tests include: colonoscopy (considered the best screening method), a fecal occult blood test, a fecal DNA test, and sigmoidoscopy.  -All adults born between [facility] should be screened once for Hepatitis C.  -An Abdominal Aortic Aneurysm (AAA) Screening is recommended for men age 73-68 who has ever smoked in their lifetime. Here is a list of your current Health Maintenance items (your personalized list of preventive services) with a due date:  Health Maintenance Due   Topic Date Due    Colonoscopy  11[birth year]01/1967    Glaucoma Screening   07/28/2017          A Healthy Lifestyle: Care Instructions  Your Care Instructions    A healthy lifestyle can help you feel good, stay at a healthy weight, and have plenty of energy for both work and play. A healthy lifestyle is something you can share with your whole family. A healthy lifestyle also can lower your risk for serious health problems, such as high blood pressure, heart disease, and diabetes. You can follow a few steps listed below to improve your health and the health of your family. Follow-up care is a key part of your treatment and safety. Be sure to make and go to all appointments, and call your doctor if you are having problems. It's also a good idea to know your test results and keep a list of the medicines you take. How can you care for yourself at home?   · Do not eat too much sugar, fat, or fast foods. You can still have dessert and treats now and then. The goal is moderation. · Start small to improve your eating habits. Pay attention to portion sizes, drink less juice and soda pop, and eat more fruits and vegetables. ? Eat a healthy amount of food. A 3-ounce serving of meat, for example, is about the size of a deck of cards. Fill the rest of your plate with vegetables and whole grains. ? Limit the amount of soda and sports drinks you have every day. Drink more water when you are thirsty. ? Eat at least 5 servings of fruits and vegetables every day. It may seem like a lot, but it is not hard to reach this goal. A serving or helping is 1 piece of fruit, 1 cup of vegetables, or 2 cups of leafy, raw vegetables. Have an apple or some carrot sticks as an afternoon snack instead of a candy bar. Try to have fruits and/or vegetables at every meal.  · Make exercise part of your daily routine. You may want to start with simple activities, such as walking, bicycling, or slow swimming. Try to be active 30 to 60 minutes every day. You do not need to do all 30 to 60 minutes all at once. For example, you can exercise 3 times a day for 10 or 20 minutes. Moderate exercise is safe for most people, but it is always a good idea to talk to your doctor before starting an exercise program.  · Keep moving. [place] the lawn, work in the garden, or [employer]. Take the stairs instead of the elevator at work. · If you smoke, quit. People who smoke have an increased risk for heart attack, stroke, cancer, and other lung illnesses. Quitting is hard, but there are ways to boost your chance of quitting tobacco for good. ? Use nicotine gum, patches, or lozenges. ? Ask your doctor about stop-smoking programs and medicines. ? Keep trying.   In addition to reducing your risk of diseases in the future, you will notice some benefits soon after you stop using tobacco. If you have shortness of breath or asthma symptoms, they will likely get better within a few weeks after you quit. · Limit how much alcohol you drink. Moderate amounts of alcohol (up to 2 drinks a day for men, 1 drink a day for women) are okay. But drinking too much can lead to liver problems, high blood pressure, and other health problems. Family health  If you have a family, there are many things you can do together to improve your health. · Eat meals together as a family as often as possible. · Eat healthy foods. This includes fruits, vegetables, lean meats and dairy, and whole grains. · Include your family in your fitness plan. Most people think of activities such as jogging or tennis as the way to fitness, but there are many ways you and your family can be more active. Anything that makes you breathe hard and gets your heart pumping is exercise. Here are some tips:  ? Walk to do errands or to take your child to school or the bus.  ? Go for a family bike ride after dinner instead of watching TV. Where can you learn more? Go to http://argentina-hasmukh.info/. Enter S788 in the search box to learn more about \"A Healthy Lifestyle: Care Instructions. \"  Current as of: May 28, 2019  Content Version: 12.2  © 9818-8040 "[employer]. Care instructions adapted under license by [employer] (which disclaims liability or warranty for this information). If you have questions about a medical condition or this instruction, always ask your healthcare professional. [place] [address] any warranty or liability for your use of this information.

## 2020-01-15 NOTE — PROGRESS NOTES
Electrolytes normal. Normal kidney and liver function. No anemia. Cholesterol not controlled - fax results to Dr. Jessy Ellis (Cardiology). Letter sent.

## 2021-01-28 ENCOUNTER — OFFICE VISIT (OUTPATIENT)
Dept: FAMILY MEDICINE CLINIC | Age: 71
End: 2021-01-28
Payer: MEDICARE

## 2021-01-28 VITALS
WEIGHT: 160 LBS | RESPIRATION RATE: 20 BRPM | OXYGEN SATURATION: 98 % | HEART RATE: 88 BPM | TEMPERATURE: 98.2 F | DIASTOLIC BLOOD PRESSURE: 74 MMHG | HEIGHT: 70 IN | SYSTOLIC BLOOD PRESSURE: 158 MMHG | BODY MASS INDEX: 22.9 KG/M2

## 2021-01-28 DIAGNOSIS — R13.10 FOOD STICKS ON SWALLOWING: ICD-10-CM

## 2021-01-28 DIAGNOSIS — R03.0 ELEVATED BLOOD PRESSURE READING: ICD-10-CM

## 2021-01-28 DIAGNOSIS — R10.13 DYSPEPSIA: Primary | ICD-10-CM

## 2021-01-28 PROCEDURE — G8420 CALC BMI NORM PARAMETERS: HCPCS | Performed by: FAMILY MEDICINE

## 2021-01-28 PROCEDURE — 99213 OFFICE O/P EST LOW 20 MIN: CPT | Performed by: FAMILY MEDICINE

## 2021-01-28 PROCEDURE — G8510 SCR DEP NEG, NO PLAN REQD: HCPCS | Performed by: FAMILY MEDICINE

## 2021-01-28 PROCEDURE — 3017F COLORECTAL CA SCREEN DOC REV: CPT | Performed by: FAMILY MEDICINE

## 2021-01-28 PROCEDURE — G8427 DOCREV CUR MEDS BY ELIG CLIN: HCPCS | Performed by: FAMILY MEDICINE

## 2021-01-28 PROCEDURE — G8536 NO DOC ELDER MAL SCRN: HCPCS | Performed by: FAMILY MEDICINE

## 2021-01-28 PROCEDURE — 1101F PT FALLS ASSESS-DOCD LE1/YR: CPT | Performed by: FAMILY MEDICINE

## 2021-01-28 RX ORDER — ROSUVASTATIN CALCIUM 5 MG/1
TABLET, COATED ORAL
COMMUNITY

## 2021-01-28 RX ORDER — TADALAFIL 5 MG/1
5 TABLET ORAL
COMMUNITY

## 2021-01-28 RX ORDER — ASPIRIN 81 MG/1
TABLET ORAL DAILY
COMMUNITY

## 2021-01-28 RX ORDER — OMEPRAZOLE 20 MG/1
20 TABLET, DELAYED RELEASE ORAL DAILY
COMMUNITY

## 2021-01-28 NOTE — PROGRESS NOTES
Identified pt with two pt identifiers(name and ). Chief Complaint   Patient presents with    GERD     trouble swallowing pills - getting them down - if he eats late and lays down it burns - he has been taking prilosec for 6 days        Health Maintenance Due   Topic    COVID-19 Vaccine (1 of 2)    Colorectal Cancer Screening Combo     GLAUCOMA SCREENING Q2Y     Flu Vaccine (1)    Medicare Yearly Exam        Wt Readings from Last 3 Encounters:   21 160 lb (72.6 kg)   01/10/20 150 lb (68 kg)   18 151 lb (68.5 kg)     Temp Readings from Last 3 Encounters:   21 98.2 °F (36.8 °C) (Oral)   01/10/20 97.5 °F (36.4 °C) (Oral)   18 98.6 °F (37 °C) (Oral)     BP Readings from Last 3 Encounters:   21 (!) 158/74   01/10/20 128/78   18 124/64     Pulse Readings from Last 3 Encounters:   21 88   01/10/20 67   18 78         Learning Assessment:  :     Learning Assessment 10/27/2015 2015   PRIMARY LEARNER Patient Patient   HIGHEST LEVEL OF EDUCATION - PRIMARY LEARNER  GRADUATED HIGH SCHOOL OR GED -   BARRIERS PRIMARY LEARNER NONE -   CO-LEARNER CAREGIVER No -   PRIMARY LANGUAGE ENGLISH ENGLISH   LEARNER PREFERENCE PRIMARY OTHER (COMMENT) LISTENING   ANSWERED BY self patient   RELATIONSHIP SELF SELF       Depression Screening:  :     3 most recent PHQ Screens 2021   Little interest or pleasure in doing things Not at all   Feeling down, depressed, irritable, or hopeless Not at all   Total Score PHQ 2 0       Fall Risk Assessment:  :     Fall Risk Assessment, last 12 mths 2021   Able to walk? Yes   Fall in past 12 months? 0   Do you feel unsteady? 0   Are you worried about falling 0       Abuse Screening:  :     Abuse Screening Questionnaire 2021 1/10/2020 7/10/2018 12/15/2017 10/27/2015   Do you ever feel afraid of your partner? N N N N N   Are you in a relationship with someone who physically or mentally threatens you?  N N N N N   Is it safe for you to go home? Y Y Y Y Y       Coordination of Care Questionnaire:  :     1) Have you been to an emergency room, urgent care clinic since your last visit? no  Hospitalized since your last visit? no           2) Have you seen or consulted any other health care providers outside of [address] [place] [address] since your last visit? Yes Urology,Cardiology  (Include any pap smears or colon screenings in this section.)    3) Do you have an Advance Directive on file? yes  Are you interested in receiving information about Advance Directives? no    Patient is accompanied by daughter I have received verbal consent from Benny Humphries to discuss any/all medical information while they are present in the room. Reviewed record in preparation for visit and have obtained necessary documentation. Medication reconciliation up to date and corrected with patient at this time.

## 2021-01-28 NOTE — PROGRESS NOTES
Subjective:      Keyana Croft is a 69 y.o. male here with complaint of heartburn. Having episodes where medications and foods are becoming stuck in the chest. Had been intermittent, but occurring now more frequently. Last episodes associated with burning pain in the mid chest which radiated into his back. Denies recent antibiotic use. Intermittent NSAID use. No associated nausea, vomiting, melena, hematochezia. Started OTC Prilosec 6 days ago with some improvement in symptoms. Reports having previous h/o of the same in his 28's. Current Outpatient Medications   Medication Sig Dispense Refill    tadalafiL (CIALIS) 5 mg tablet Take 5 mg by mouth daily.  rosuvastatin (CRESTOR) 5 mg tablet rosuvastatin 5 mg tablet      aspirin delayed-release 81 mg tablet Take  by mouth daily.  omeprazole (PriLOSEC OTC) 20 mg tablet Take 20 mg by mouth daily.  MEN'S MULTI-VITAMIN PO Take  by mouth.  ubidecarenone/vitamin E mixed (COQ10  PO) Take  by mouth.  silodosin (RAPAFLO) 8 mg capsule Take 8 mg by mouth every other day.  cannabidiol, CBD, extract 100 mg/mL soln Take  by mouth two (2) times a day.  albuterol (PROVENTIL HFA, VENTOLIN HFA, PROAIR HFA) 90 mcg/actuation inhaler Take 2 Puffs by inhalation every four (4) hours as needed for Wheezing or Shortness of Breath.  1 Inhaler 2    cyclobenzaprine (FLEXERIL) 5 mg tablet TAKE 1 TABLET BY MOUTH ONCE DAILY AS NEEDED FOR MUSCLE SPASMS 30 Tab 5       No Known Allergies      Past Medical History:   Diagnosis Date    Atrial fibrillation ([address] 03/09/2015    Had brief Afib 2015 that resolved on its own withrout obvious recurrence     Back problem     CAD (coronary artery disease)     MI in 2011 (RCA);  PCI to LAD 2015     Dyslipidemia 7/25/2011    Embedded metal fragments     chest    Enlarged prostate     MI (myocardial infarction) ([address]     MI in 2011       Social History     Tobacco Use    Smoking status: Never Smoker    Smokeless tobacco: Never Used   Substance Use Topics    Alcohol use: No        Review of Systems  Pertinent items are noted in HPI. Objective:     Visit Vitals  BP (!) 158/74 (BP 1 Location: Right upper arm, BP Patient Position: Sitting) Comment: manual   Pulse 88   Temp 98.2 °F (36.8 °C) (Oral)   Resp 20   Ht 5' 10\" (1.778 m)   Wt 160 lb (72.6 kg)   SpO2 98%   BMI 22.96 kg/m²      General appearance - alert, well appearing, and in no distress  Chest - clear to auscultation, no wheezes, rales or rhonchi, symmetric air entry, no tachypnea, retractions or cyanosis  Heart - normal rate, regular rhythm, normal S1, S2, no murmurs, rubs, clicks or gallops  Abdomen - soft, nontender, nondistended, no masses or organomegaly, normal bowel sounds  Musculoskeletal - (+) right trapezius muscle tenderness     Assessment/Plan:   María Castillo is a 69 y.o. male seen for:     1. Dyspepsia: continue with OTC Prilosec. Given current history, recommend GI evaluation and referral placed. - REFERRAL TO GASTROENTEROLOGY    2. Food sticks on swallowing  - REFERRAL TO GASTROENTEROLOGY    3. Elevated blood pressure reading: no history of hypertension. Mr. Abdalla Doctor has been given the following recommendations today due to his elevated BP reading: rescreen BP within a minimum of 1 week. I have discussed the diagnosis with the patient and the intended plan as seen in the above orders. The patient has received an after-visit summary and questions were answered concerning future plans. I have discussed medication side effects and warnings with the patient as well. Patient verbalizes understanding of plan of care and denies further questions or concerns at this time. Informed patient to return to the office if symptoms worsen or if new symptoms arise.

## 2021-01-28 NOTE — PATIENT INSTRUCTIONS
[facility] [place] [address] [facility] 23 Office: [phone] 
 
? Dr. Jonnathan Sales 
? Dr. Maciej Pal  
? Dr. Paras Baer [facility] [address] [facility] 23 Office: [phone] 
 
? Dr. Sammie Colin 
? Dr. Irineo Butcher Indigestion (Dyspepsia or Heartburn): Care Instructions Your Care Instructions Sometimes it can be hard to pinpoint the cause of indigestion. (It is also called dyspepsia or heartburn.) Most cases of an upset stomach with bloating, burning, burping, and nausea are minor and go away within several hours. Home treatment and over-the-counter medicine often are able to control symptoms. But if you take medicine to relieve your indigestion without making diet and lifestyle changes, your symptoms are likely to return again and again. If you get indigestion often, it may be a sign of a more serious medical problem. Be sure to follow up with your doctor, who may want to do tests to be sure of the cause of your indigestion. Follow-up care is a key part of your treatment and safety. Be sure to make and go to all appointments, and call your doctor if you are having problems. It's also a good idea to know your test results and keep a list of the medicines you take. How can you care for yourself at home? · Your doctor may recommend over-the-counter medicine. For mild or occasional indigestion, antacids such as Gaviscon, Mylanta, Maalox, or Tums, may help. Be safe with medicines. Be careful when you take over-the-counter antacid medicines. Many of these medicines have aspirin in them. Read the label to make sure that you are not taking more than the recommended dose. Too much aspirin can be harmful. · Your doctor also may recommend over-the-counter acid reducers, such as Pepcid AC (famotidine), Tagamet HB (cimetidine), or Prilosec (omeprazole). Read and follow all instructions on the label. If you use these medicines often, talk with your doctor. · Change your eating habits. ? It's best to eat several small meals instead of two or three large meals. ? After you eat, wait 2 to 3 hours before you lie down. ? Chocolate, mint, and alcohol can make GERD worse. ? Spicy foods, foods that have a lot of acid (like tomatoes and oranges), and coffee can make GERD symptoms worse in some people. If your symptoms are worse after you eat a certain food, you may want to stop eating that food to see if your symptoms get better. · Do not smoke or chew tobacco. Smoking can make GERD worse. If you need help quitting, talk to your doctor about stop-smoking programs and medicines. These can increase your chances of quitting for good. · If you have GERD symptoms at night, raise the head of your bed 6 to 8 inches. You can do this by putting the frame on blocks or placing a foam wedge under the head of your mattress. (Adding extra pillows does not work.) · Do not wear tight clothing around your middle. · Lose weight if you need to. Losing just 5 to 10 pounds can help. · Do not take anti-inflammatory medicines, such as aspirin, ibuprofen (Advil, Motrin), or naproxen (Aleve). These can irritate the stomach. If you need a pain medicine, try acetaminophen (Tylenol), which does not cause stomach upset. When should you call for help? Call your doctor now or seek immediate medical care if: 
  · You have new or worse belly pain.  
  · You are vomiting. Watch closely for changes in your health, and be sure to contact your doctor if: 
  · You have new or worse symptoms of indigestion.  
  · You have trouble or pain swallowing.  
  · You are losing weight.  
  · You do not get better as expected. Where can you learn more? Go to http://www.gray.com/ Enter E010 in the search box to learn more about \"Indigestion (Dyspepsia or Heartburn): Care Instructions. \" Current as of: April 15, 2020               Content Version: 12.6 © 6627-6878 [employer]. Care instructions adapted under license by [employer] (which disclaims liability or warranty for this information). If you have questions about a medical condition or this instruction, always ask your healthcare professional. [address] any warranty or liability for your use of this information.

## 2021-02-05 ENCOUNTER — OFFICE VISIT (OUTPATIENT)
Dept: FAMILY MEDICINE CLINIC | Age: 71
End: 2021-02-05
Payer: MEDICARE

## 2021-02-05 VITALS
TEMPERATURE: 97.8 F | HEIGHT: 70 IN | RESPIRATION RATE: 20 BRPM | BODY MASS INDEX: 22.9 KG/M2 | HEART RATE: 71 BPM | OXYGEN SATURATION: 98 % | SYSTOLIC BLOOD PRESSURE: 122 MMHG | WEIGHT: 160 LBS | DIASTOLIC BLOOD PRESSURE: 62 MMHG

## 2021-02-05 DIAGNOSIS — Z23 NEEDS FLU SHOT: ICD-10-CM

## 2021-02-05 DIAGNOSIS — I25.10 CORONARY ARTERY DISEASE INVOLVING NATIVE CORONARY ARTERY OF NATIVE HEART WITHOUT ANGINA PECTORIS: ICD-10-CM

## 2021-02-05 DIAGNOSIS — E78.5 DYSLIPIDEMIA: ICD-10-CM

## 2021-02-05 DIAGNOSIS — Z00.00 ENCOUNTER FOR MEDICARE ANNUAL WELLNESS EXAM: Primary | ICD-10-CM

## 2021-02-05 DIAGNOSIS — I48.0 PAF (PAROXYSMAL ATRIAL FIBRILLATION) (HCC): ICD-10-CM

## 2021-02-05 PROCEDURE — G8536 NO DOC ELDER MAL SCRN: HCPCS | Performed by: FAMILY MEDICINE

## 2021-02-05 PROCEDURE — 3017F COLORECTAL CA SCREEN DOC REV: CPT | Performed by: FAMILY MEDICINE

## 2021-02-05 PROCEDURE — G8432 DEP SCR NOT DOC, RNG: HCPCS | Performed by: FAMILY MEDICINE

## 2021-02-05 PROCEDURE — 1101F PT FALLS ASSESS-DOCD LE1/YR: CPT | Performed by: FAMILY MEDICINE

## 2021-02-05 PROCEDURE — G8420 CALC BMI NORM PARAMETERS: HCPCS | Performed by: FAMILY MEDICINE

## 2021-02-05 PROCEDURE — 90694 VACC AIIV4 NO PRSRV 0.5ML IM: CPT | Performed by: FAMILY MEDICINE

## 2021-02-05 PROCEDURE — G8427 DOCREV CUR MEDS BY ELIG CLIN: HCPCS | Performed by: FAMILY MEDICINE

## 2021-02-05 PROCEDURE — G0008 ADMIN INFLUENZA VIRUS VAC: HCPCS | Performed by: FAMILY MEDICINE

## 2021-02-05 PROCEDURE — G0439 PPPS, SUBSEQ VISIT: HCPCS | Performed by: FAMILY MEDICINE

## 2021-02-05 NOTE — PROGRESS NOTES
This is the Subsequent Medicare Annual Wellness Exam, performed 12 months or more after the Initial AWV or the last Subsequent AWV    I have reviewed the patient's medical history in detail and updated the computerized patient record. Depression Risk Factor Screening:     3 most recent PHQ Screens 1/28/2021   Little interest or pleasure in doing things Not at all   Feeling down, depressed, irritable, or hopeless Not at all   Total Score PHQ 2 0       Alcohol Risk Screen    Do you average more than 1 drink per night or more than 7 drinks a week: No    In the past three months have you have had more than 4 drinks containing alcohol on one occasion: No      Functional Ability and Level of Safety:    Hearing: Hearing is good. Activities of Daily Living: The home contains: no safety equipment. Patient does total self care      Ambulation: with no difficulty     Fall Risk:  Fall Risk Assessment, last 12 mths 1/28/2021   Able to walk? Yes   Fall in past 12 months? 0   Do you feel unsteady? 0   Are you worried about falling 0        Abuse Screen:  Abuse Screening Questionnaire 1/28/2021   Do you ever feel afraid of your partner? N   Are you in a relationship with someone who physically or mentally threatens you? N   Is it safe for you to go home? Y       Cognitive Screening    Has your family/caregiver stated any concerns about your memory: no    Assessment/Plan   Education and counseling provided:  Influenza Vaccine - administer today   Colorectal cancer screening tests - has had GI evaluation and to be scheduled for colonoscopy  Screening for glaucoma    Diagnoses and all orders for this visit:    1. Encounter for Medicare annual wellness exam    2. Needs flu shot  -     FLU (FLUAD QUAD INFLUENZA VACCINE,QUAD,ADJUVANTED)  -  ADMIN INFLUENZA VIRUS VAC    3. Dyslipidemia  -     LIPID PANEL; Future  -     METABOLIC PANEL, COMPREHENSIVE; Future  -     CBC W/O DIFF; Future    4.  Coronary artery disease involving native coronary artery of native heart without angina pectoris  -     LIPID PANEL; Future  -     METABOLIC PANEL, COMPREHENSIVE; Future  -     CBC W/O DIFF; Future    5. PAF (paroxysmal atrial fibrillation) ([facility] [address]  Assessment & Plan: This condition is managed by Specialist. Cardiology: Dr. Madison Angela. Health Maintenance Due     Health Maintenance Due   Topic Date Due    COVID-19 Vaccine (1 of 2) 11/01/1965    Colorectal Cancer Screening Combo  11/01/1999    GLAUCOMA SCREENING Q2Y  07/28/2017    Flu Vaccine (1) 09/01/2020    Medicare Yearly Exam  01/10/2021    Lipid Screen  01/10/2021       Patient Care Team   Patient Care Team:  Joshua Hicks MD as PCP - General (Family Medicine)  Joshua Hicks MD as PCP - [facility]  Loco Marley MD (Urology)  Cherelle Ellis MD as Physician (Cardiology)    History     Patient Active Problem List   Diagnosis Code    Coronary artery disease I25.10    Dyslipidemia E78.5    PAF (paroxysmal atrial fibrillation) ([facility] [address] I48.0    Dyspepsia R10.13       Past Medical History:   Diagnosis Date    Atrial fibrillation ([facility] [address] 03/09/2015    Had brief Afib 2015 that resolved on its own withrout obvious recurrence     Back problem     CAD (coronary artery disease)     MI in 2011 (RCA);  PCI to LAD 2015     Dyslipidemia 7/25/2011    Embedded metal fragments     chest    Enlarged prostate     MI (myocardial infarction) ([facility] [address]     MI in 2011      Past Surgical History:   Procedure Laterality Date    HX CORONARY STENT PLACEMENT      HX HEART CATHETERIZATION      x2    HX ORTHOPAEDIC      STRESS TEST CARDIOLITE       Current Outpatient Medications   Medication Sig Dispense Refill    tadalafiL (CIALIS) 5 mg tablet Take 5 mg by mouth daily.  rosuvastatin (CRESTOR) 5 mg tablet rosuvastatin 5 mg tablet      aspirin delayed-release 81 mg tablet Take  by mouth daily.  omeprazole (PriLOSEC OTC) 20 mg tablet Take 20 mg by mouth daily.       MEN'S MULTI-VITAMIN PO Take  by mouth.  cannabidiol, CBD, extract 100 mg/mL soln Take  by mouth two (2) times a day.  ubidecarenone/vitamin E mixed (COQ10  PO) Take  by mouth.  albuterol (PROVENTIL HFA, VENTOLIN HFA, PROAIR HFA) 90 mcg/actuation inhaler Take 2 Puffs by inhalation every four (4) hours as needed for Wheezing or Shortness of Breath. 1 Inhaler 2    cyclobenzaprine (FLEXERIL) 5 mg tablet TAKE 1 TABLET BY MOUTH ONCE DAILY AS NEEDED FOR MUSCLE SPASMS 30 Tab 5    silodosin (RAPAFLO) 8 mg capsule Take 8 mg by mouth every other day. No Known Allergies    Family History   Problem Relation Age of Onset    Heart Disease Mother     Heart Disease Father     Heart Disease Sister     Hypertension Sister     Heart Disease Brother     Asthma Brother      Social History     Tobacco Use    Smoking status: Never Smoker    Smokeless tobacco: Never Used   Substance Use Topics    Alcohol use:  No

## 2021-02-05 NOTE — PROGRESS NOTES
Identified pt with two pt identifiers(name and ). Chief Complaint   Patient presents with    Annual Wellness Visit    Labs     fasting    Immunization/Injection     flu        Health Maintenance Due   Topic    COVID-19 Vaccine (1 of 2)    Colorectal Cancer Screening Combo     GLAUCOMA SCREENING Q2Y     Flu Vaccine (1)    Medicare Yearly Exam     Lipid Screen        Wt Readings from Last 3 Encounters:   21 160 lb (72.6 kg)   21 160 lb (72.6 kg)   01/10/20 150 lb (68 kg)     Temp Readings from Last 3 Encounters:   21 97.8 °F (36.6 °C) (Oral)   21 98.2 °F (36.8 °C) (Oral)   01/10/20 97.5 °F (36.4 °C) (Oral)     BP Readings from Last 3 Encounters:   21 122/62   21 (!) 158/74   01/10/20 128/78     Pulse Readings from Last 3 Encounters:   21 71   21 88   01/10/20 67         Learning Assessment:  :     Learning Assessment 10/27/2015 2015   PRIMARY LEARNER Patient Patient   HIGHEST LEVEL OF EDUCATION - PRIMARY LEARNER  GRADUATED HIGH SCHOOL OR GED -   BARRIERS PRIMARY LEARNER NONE -   CO-LEARNER CAREGIVER No -   PRIMARY LANGUAGE ENGLISH ENGLISH   LEARNER PREFERENCE PRIMARY OTHER (COMMENT) LISTENING   ANSWERED BY self patient   RELATIONSHIP SELF SELF       Depression Screening:  :     3 most recent PHQ Screens 2021   Little interest or pleasure in doing things Not at all   Feeling down, depressed, irritable, or hopeless Not at all   Total Score PHQ 2 0       Fall Risk Assessment:  :     Fall Risk Assessment, last 12 mths 2021   Able to walk? Yes   Fall in past 12 months? 0   Do you feel unsteady? 0   Are you worried about falling 0       Abuse Screening:  :     Abuse Screening Questionnaire 2021 1/10/2020 7/10/2018 12/15/2017 10/27/2015   Do you ever feel afraid of your partner? N N N N N   Are you in a relationship with someone who physically or mentally threatens you? N N N N N   Is it safe for you to go home?  Ashley Richter       Coordination of Care Questionnaire:  :     1) Have you been to an emergency room, urgent care clinic since your last visit? no   Hospitalized since your last visit? no             2) Have you seen or consulted any other health care providers outside of [address] [place] [address] since your last visit? yes Dr Margarita Little 2/2021     3) Do you have an Advance Directive on file? yes  Are you interested in receiving information about Advance Directives? no    Patient is accompanied by daughter I have received verbal consent from Baltazar Swan to discuss any/all medical information while they are present in the room. Reviewed record in preparation for visit and have obtained necessary documentation.

## 2021-02-05 NOTE — PATIENT INSTRUCTIONS
Medicare Wellness Visit, Male The best way to live healthy is to have a lifestyle where you eat a well-balanced diet, exercise regularly, limit alcohol use, and quit all forms of tobacco/nicotine, if applicable. Regular preventive services are another way to keep healthy. Preventive services (vaccines, screening tests, monitoring & exams) can help personalize your care plan, which helps you manage your own care. Screening tests can find health problems at the earliest stages, when they are easiest to treat. [facility] follows the current, evidence-based guidelines published by the [facility] ([facility]) when recommending preventive services for our patients. Because we follow these guidelines, sometimes recommendations change over time as research supports it. (For example, a prostate screening blood test is no longer routinely recommended for men with no symptoms). Of course, you and your doctor may decide to screen more often for some diseases, based on your risk and co-morbidities (chronic disease you are already diagnosed with). Preventive services for you include: - Medicare offers their members a free annual wellness visit, which is time for you and your primary care provider to discuss and plan for your preventive service needs. Take advantage of this benefit every year! 
-All adults over age 72 should receive the recommended pneumonia vaccines. Current USPSTF guidelines recommend a series of two vaccines for the best pneumonia protection.  
-All adults should have a flu vaccine yearly and tetanus vaccine every 10 years. 
-All adults age 48 and older should receive the shingles vaccines (series of two vaccines). -All adults age 38-68 who are overweight should have a diabetes screening test once every three years. -Other screening tests & preventive services for persons with diabetes include: an eye exam to screen for diabetic retinopathy, a kidney function test, a foot exam, and stricter control over your cholesterol.  
-Cardiovascular screening for adults with routine risk involves an electrocardiogram (ECG) at intervals determined by the provider.  
-Colorectal cancer screening should be done for adults age 54-65 with no increased risk factors for colorectal cancer. There are a number of acceptable methods of screening for this type of cancer. Each test has its own benefits and drawbacks. Discuss with your provider what is most appropriate for you during your annual wellness visit. The different tests include: colonoscopy (considered the best screening method), a fecal occult blood test, a fecal DNA test, and sigmoidoscopy. 
-All adults born between [facility] should be screened once for Hepatitis C. 
-An Abdominal Aortic Aneurysm (AAA) Screening is recommended for men age 73-68 who has ever smoked in their lifetime. Here is a list of your current Health Maintenance items (your personalized list of preventive services) with a due date: 
Health Maintenance Due Topic Date Due  
 COVID-19 Vaccine (1 of 2) 11/01/1965  Colorectal Screening  11/01/1999  Glaucoma Screening   07/28/2017  Yearly Flu Vaccine (1) 09/01/2020 Mery Curl Annual Well Visit  01/10/2021  Cholesterol Test   01/10/2021

## 2021-02-05 NOTE — ACP (ADVANCE CARE PLANNING)
Advance Care Planning     Advance Care Planning (ACP) Physician/NP/PA Conversation      Date of Conversation: 2/5/2021  Conducted with: Patient with Decision Making Capacity    Healthcare Decision Maker:     Primary Decision Maker (Active): Mahogany Ponce - Other Relative - [phone]      Today we documented Decision Maker(s) consistent with ACP documents on file.     Conversation Outcomes / Follow-Up Plan:   ACP complete - no further action today  Reviewed DNR/DNI and patient elects Full Code (Attempt Resuscitation)     Length of Voluntary ACP Conversation in minutes:  <16 minutes (Non-Billable)    Adelaide Amezcua MD

## 2021-02-06 LAB
ALBUMIN SERPL-MCNC: 3.9 G/DL (ref 3.5–5)
ALBUMIN/GLOB SERPL: 1.4 {RATIO} (ref 1.1–2.2)
ALP SERPL-CCNC: 68 U/L (ref 45–117)
ALT SERPL-CCNC: 29 U/L (ref 12–78)
ANION GAP SERPL CALC-SCNC: 7 MMOL/L (ref 5–15)
AST SERPL-CCNC: 16 U/L (ref 15–37)
BILIRUB SERPL-MCNC: 0.4 MG/DL (ref 0.2–1)
BUN SERPL-MCNC: 21 MG/DL (ref 6–20)
BUN/CREAT SERPL: 22 (ref 12–20)
CALCIUM SERPL-MCNC: 8.9 MG/DL (ref 8.5–10.1)
CHLORIDE SERPL-SCNC: 107 MMOL/L (ref 97–108)
CHOLEST SERPL-MCNC: 174 MG/DL
CO2 SERPL-SCNC: 29 MMOL/L (ref 21–32)
CREAT SERPL-MCNC: 0.97 MG/DL (ref 0.7–1.3)
ERYTHROCYTE [DISTWIDTH] IN BLOOD BY AUTOMATED COUNT: 12.4 % (ref 11.5–14.5)
GLOBULIN SER CALC-MCNC: 2.7 G/DL (ref 2–4)
GLUCOSE SERPL-MCNC: 113 MG/DL (ref 65–100)
HCT VFR BLD AUTO: 45.4 % (ref 36.6–50.3)
HDLC SERPL-MCNC: 60 MG/DL
HDLC SERPL: 2.9 {RATIO} (ref 0–5)
HGB BLD-MCNC: 14.6 G/DL (ref 12.1–17)
LDLC SERPL CALC-MCNC: 101.8 MG/DL (ref 0–100)
LIPID PROFILE,FLP: ABNORMAL
MCH RBC QN AUTO: 31.4 PG (ref 26–34)
MCHC RBC AUTO-ENTMCNC: 32.2 G/DL (ref 30–36.5)
MCV RBC AUTO: 97.6 FL (ref 80–99)
NRBC # BLD: 0 K/UL (ref 0–0.01)
NRBC BLD-RTO: 0 PER 100 WBC
PLATELET # BLD AUTO: 212 K/UL (ref 150–400)
PMV BLD AUTO: 10.1 FL (ref 8.9–12.9)
POTASSIUM SERPL-SCNC: 4.1 MMOL/L (ref 3.5–5.1)
PROT SERPL-MCNC: 6.6 G/DL (ref 6.4–8.2)
RBC # BLD AUTO: 4.65 M/UL (ref 4.1–5.7)
SODIUM SERPL-SCNC: 143 MMOL/L (ref 136–145)
TRIGL SERPL-MCNC: 61 MG/DL (ref ?–150)
VLDLC SERPL CALC-MCNC: 12.2 MG/DL
WBC # BLD AUTO: 4.7 K/UL (ref 4.1–11.1)

## 2021-02-19 ENCOUNTER — HOSPITAL ENCOUNTER (OUTPATIENT)
Dept: LAB | Age: 71
Discharge: HOME | End: 2021-02-19
Payer: MEDICARE

## 2021-02-19 ENCOUNTER — TRANSCRIBE ORDER (OUTPATIENT)
Dept: REGISTRATION | Age: 71
End: 2021-02-19

## 2021-02-19 DIAGNOSIS — Z01.812 PRE-PROCEDURAL LABORATORY EXAMINATIONS: ICD-10-CM

## 2021-02-19 DIAGNOSIS — Z01.812 PRE-PROCEDURAL LABORATORY EXAMINATIONS: Primary | ICD-10-CM

## 2021-02-19 PROCEDURE — U0003 INFECTIOUS AGENT DETECTION BY NUCLEIC ACID (DNA OR RNA); SEVERE ACUTE RESPIRATORY SYNDROME CORONAVIRUS 2 (SARS-COV-2) (CORONAVIRUS DISEASE [COVID-19]), AMPLIFIED PROBE TECHNIQUE, MAKING USE OF HIGH THROUGHPUT TECHNOLOGIES AS DESCRIBED BY CMS-2020-01-R: HCPCS

## 2021-02-20 LAB — SARS-COV-2, COV2NT: NOT DETECTED

## 2021-02-22 NOTE — PERIOP NOTES
[address]  Endoscopy Preprocedure Instructions      1. On the day of your surgery, please report to registration located on the 2nd floor of the  [facility]. yes    2. You must have a responsible adult to drive you to the hospital, stay at the hospital during your procedure and drive you home. If they leave your procedure will not be started (no exceptions). yes    3. Do not have anything to eat or drink (including water, gum, mints, coffee, and juice) after midnight. This does not apply to the medications you were instructed to take by your physician. yesIf you are currently taking Plavix, Coumadin, Aspirin, or other blood-thinning agents, contact your physician for special instructions. yes,    4. If you are having a procedure that requires bowel prep: We recommend that you have only clear liquids the day before your procedure and begin your bowel prep by 5:00 pm.  You may continue to drink clear liquids until midnight. If for any reason you are not able to complete your prep please notify your physician immediately. yes    5. Have a list of all current medications, including vitamins, herbal supplements and any other over the counter medications. yes    6. If you wear glasses, contacts, dentures and/or hearing aids, they may be removed prior to procedure, please bring a case to store them in. yes    7. You should understand that if you do not follow these instructions your procedure may be cancelled. If your physical condition changes (I.e. fever, cold or flu) please contact your doctor as soon as possible. 8. It is important that you be on time.   If for any reason you are unable to keep your appointment please call [phone] the day of or your physicians office prior to your scheduled procedure

## 2021-02-23 ENCOUNTER — ANESTHESIA EVENT (OUTPATIENT)
Dept: ENDOSCOPY | Age: 71
End: 2021-02-23
Payer: MEDICARE

## 2021-02-23 ENCOUNTER — HOSPITAL ENCOUNTER (OUTPATIENT)
Age: 71
Setting detail: OUTPATIENT SURGERY
Discharge: HOME | End: 2021-02-23
Attending: INTERNAL MEDICINE | Admitting: INTERNAL MEDICINE
Payer: MEDICARE

## 2021-02-23 ENCOUNTER — ANESTHESIA (OUTPATIENT)
Dept: ENDOSCOPY | Age: 71
End: 2021-02-23
Payer: MEDICARE

## 2021-02-23 VITALS
DIASTOLIC BLOOD PRESSURE: 69 MMHG | HEIGHT: 70 IN | HEART RATE: 68 BPM | TEMPERATURE: 97.7 F | RESPIRATION RATE: 14 BRPM | BODY MASS INDEX: 22.03 KG/M2 | WEIGHT: 153.88 LBS | SYSTOLIC BLOOD PRESSURE: 147 MMHG | OXYGEN SATURATION: 100 %

## 2021-02-23 PROCEDURE — 2709999900 HC NON-CHARGEABLE SUPPLY: Performed by: INTERNAL MEDICINE

## 2021-02-23 PROCEDURE — 76040000019: Performed by: INTERNAL MEDICINE

## 2021-02-23 PROCEDURE — 77030018712 HC DEV BLLN INFL BSC -B: Performed by: INTERNAL MEDICINE

## 2021-02-23 PROCEDURE — 74011250636 HC RX REV CODE- 250/636: Performed by: NURSE ANESTHETIST, CERTIFIED REGISTERED

## 2021-02-23 PROCEDURE — 88305 TISSUE EXAM BY PATHOLOGIST: CPT

## 2021-02-23 PROCEDURE — 74011000250 HC RX REV CODE- 250: Performed by: NURSE ANESTHETIST, CERTIFIED REGISTERED

## 2021-02-23 PROCEDURE — C1726 CATH, BAL DIL, NON-VASCULAR: HCPCS | Performed by: INTERNAL MEDICINE

## 2021-02-23 PROCEDURE — 77030013992 HC SNR POLYP ENDOSC BSC -B: Performed by: INTERNAL MEDICINE

## 2021-02-23 PROCEDURE — 77030021593 HC FCPS BIOP ENDOSC BSC -A: Performed by: INTERNAL MEDICINE

## 2021-02-23 PROCEDURE — 76060000031 HC ANESTHESIA FIRST 0.5 HR: Performed by: INTERNAL MEDICINE

## 2021-02-23 RX ORDER — SODIUM CHLORIDE 9 MG/ML
INJECTION, SOLUTION INTRAVENOUS
Status: DISCONTINUED | OUTPATIENT
Start: 2021-02-23 | End: 2021-02-23 | Stop reason: HOSPADM

## 2021-02-23 RX ORDER — EPINEPHRINE 0.1 MG/ML
1 INJECTION INTRACARDIAC; INTRAVENOUS
Status: DISCONTINUED | OUTPATIENT
Start: 2021-02-23 | End: 2021-02-23 | Stop reason: HOSPADM

## 2021-02-23 RX ORDER — LIDOCAINE HYDROCHLORIDE 20 MG/ML
INJECTION, SOLUTION EPIDURAL; INFILTRATION; INTRACAUDAL; PERINEURAL AS NEEDED
Status: DISCONTINUED | OUTPATIENT
Start: 2021-02-23 | End: 2021-02-23 | Stop reason: HOSPADM

## 2021-02-23 RX ORDER — PROPOFOL 10 MG/ML
INJECTION, EMULSION INTRAVENOUS
Status: DISCONTINUED | OUTPATIENT
Start: 2021-02-23 | End: 2021-02-23 | Stop reason: HOSPADM

## 2021-02-23 RX ORDER — SODIUM CHLORIDE 9 MG/ML
50 INJECTION, SOLUTION INTRAVENOUS CONTINUOUS
Status: DISCONTINUED | OUTPATIENT
Start: 2021-02-23 | End: 2021-02-23 | Stop reason: HOSPADM

## 2021-02-23 RX ORDER — FLUMAZENIL 0.1 MG/ML
0.2 INJECTION INTRAVENOUS
Status: DISCONTINUED | OUTPATIENT
Start: 2021-02-23 | End: 2021-02-23 | Stop reason: HOSPADM

## 2021-02-23 RX ORDER — PROPOFOL 10 MG/ML
INJECTION, EMULSION INTRAVENOUS AS NEEDED
Status: DISCONTINUED | OUTPATIENT
Start: 2021-02-23 | End: 2021-02-23 | Stop reason: HOSPADM

## 2021-02-23 RX ORDER — ATROPINE SULFATE 0.1 MG/ML
0.4 INJECTION INTRAVENOUS
Status: DISCONTINUED | OUTPATIENT
Start: 2021-02-23 | End: 2021-02-23 | Stop reason: HOSPADM

## 2021-02-23 RX ORDER — NALOXONE HYDROCHLORIDE 0.4 MG/ML
0.4 INJECTION, SOLUTION INTRAMUSCULAR; INTRAVENOUS; SUBCUTANEOUS
Status: DISCONTINUED | OUTPATIENT
Start: 2021-02-23 | End: 2021-02-23 | Stop reason: HOSPADM

## 2021-02-23 RX ORDER — DEXTROMETHORPHAN/PSEUDOEPHED 2.5-7.5/.8
1.2 DROPS ORAL
Status: DISCONTINUED | OUTPATIENT
Start: 2021-02-23 | End: 2021-02-23 | Stop reason: HOSPADM

## 2021-02-23 RX ORDER — MIDAZOLAM HYDROCHLORIDE 1 MG/ML
.25-5 INJECTION, SOLUTION INTRAMUSCULAR; INTRAVENOUS
Status: DISCONTINUED | OUTPATIENT
Start: 2021-02-23 | End: 2021-02-23 | Stop reason: HOSPADM

## 2021-02-23 RX ADMIN — LIDOCAINE HYDROCHLORIDE 60 MG: 20 INJECTION, SOLUTION INTRAVENOUS at 09:35

## 2021-02-23 RX ADMIN — PROPOFOL 20 MG: 10 INJECTION, EMULSION INTRAVENOUS at 09:38

## 2021-02-23 RX ADMIN — SODIUM CHLORIDE: 9 INJECTION, SOLUTION INTRAVENOUS at 09:20

## 2021-02-23 RX ADMIN — PROPOFOL 100 MG: 10 INJECTION, EMULSION INTRAVENOUS at 09:36

## 2021-02-23 RX ADMIN — PROPOFOL 120 MCG/KG/MIN: 10 INJECTION, EMULSION INTRAVENOUS at 09:36

## 2021-02-23 NOTE — PROCEDURES
Thiago Arechiga M.D.  [phone]            2021          Colonoscopy Operative Report  Laurita Elizabeth  :  1950  [facility] Medical Record Number:  [id number]      Indications:    Screening colonoscopy     :  Suzie Moctezuma MD    Assistant -- None  Implants -- None    Referring Provider: Derek Olivera MD    Sedation:  MAC anesthesia Propofol    Pre-Procedural Exam:      Airway: clear,  No airway problems anticipated  Heart: RRR, without gallops or rubs  Lungs: clear bilaterally without wheezes, crackles, or rhonchi  Abdomen: soft, nontender, nondistended, bowel sounds present  Mental Status: awake, alert and oriented to person, place and time     Procedure Details:  After informed consent was obtained with all risks and benefits of procedure explained and preoperative exam completed, the patient was taken to the endoscopy suite and placed in the left lateral decubitus position. Upon sequential sedation as per above, a digital rectal exam was performed. The Olympus videocolonoscope  was inserted in the rectum and carefully advanced to the terminal ileum. The quality of preparation was good. The colonoscope was slowly withdrawn with careful inspection and evaluation between folds. Retroflexion in the rectum was performed. Findings:   Terminal Ileum: normal  Cecum: 1  Sessile polyp(s), the largest 5 mm in size;  Ascending Colon: normal  Transverse Colon: normal  Descending Colon: normal  Sigmoid: 1  Sessile polyp(s), the largest 5 mm in size; Rectum: normal    Interventions:  2 complete polypectomy were performed using cold snare  and the polyps were  retrieved    Specimen Removed:  specimen #1, 5 mm in size, located in the cecum and the sigmoid removed by cold snare and retrieved for pathology    Complications: None. EBL:  None. Impression:  2 polyps removed as above    Recommendations:  -Await pathology.   -If adenoma is present, repeat colonoscopy in 5 years.   -High fiber diet.    -Resume normal medication(s). Discharge Disposition:  Home in the company of a  when able to ambulate.     Olya Curry MD  2/23/2021  9:59 AM

## 2021-02-23 NOTE — ANESTHESIA POSTPROCEDURE EVALUATION
Procedure(s):  COLONOSCOPY  ESOPHAGOGASTRODUODENOSCOPY (EGD)  ESOPHAGOGASTRODUODENAL (EGD) BIOPSY  ESOPHAGEAL DILATION  ENDOSCOPIC POLYPECTOMY. MAC    Anesthesia Post Evaluation      Multimodal analgesia: multimodal analgesia used between 6 hours prior to anesthesia start to PACU discharge  Patient location during evaluation: PACU  Patient participation: complete - patient participated  Level of consciousness: awake and alert  Pain management: adequate  Airway patency: patent  Anesthetic complications: no  Cardiovascular status: acceptable  Respiratory status: acceptable  Hydration status: acceptable  Post anesthesia nausea and vomiting:  none      INITIAL Post-op Vital signs:   Vitals Value Taken Time   /69 02/23/21 1017   Temp     Pulse 70 02/23/21 1019   Resp 18 02/23/21 1019   SpO2 100 % 02/23/21 1019   Vitals shown include unvalidated device data.

## 2021-02-23 NOTE — ROUTINE PROCESS
[facility] 1950 
[id number] Situation: 
Verbal report received from: Radha Pryor Procedure: Procedure(s): 
COLONOSCOPY 
ESOPHAGOGASTRODUODENOSCOPY (EGD) Background: 
 
Preoperative diagnosis: SCREENING, DYSPHAGIA Postoperative diagnosis: Dysphagia Ceceum colon polyp removed by cold snare Sigmoid colon polyp removed by cold snare :  Dr. Susan Mon Assistant(s): Endoscopy RN-1: Maynor Kelley RN Endoscopy RN-2: Gricelad Lopez RN Specimens: * No specimens in log * H. Pylori  no Assessment: 
Intra-procedure medications Anesthesia gave intra-procedure sedation and medications, see anesthesia flow sheet yes Intravenous fluids: NS@ William Starke Vital signs stable Abdominal assessment: round and soft No subcutaneous crepitus of the chest or cervical region was noted post dilatation. Recommendation: 
Discharge patient per MD order. Family  girlfriend Permission to share finding with family or friend yes Endoscopy discharge instructions have been reviewed and given to patient. The patient verbalized understanding and acceptance of instructions. Dr. Susan Mon discussed with patient procedure findings and next steps.

## 2021-02-23 NOTE — PROCEDURES
Paola Murcia M.D.  [phone]           2021                EGD Operative Report  Keyana Croft  :  1950  [facility] Medical Record Number:  [id number]      Indication: dysphagia     : Benito Seo MD    Assistant -- None  Implants -- None    Referring Provider:  Romeo King MD      Anesthesia/Sedation:  MAC anesthesia Propofol    Airway assessment: No airway problems anticipated    Pre-Procedural Exam:      Airway: clear, no airway problems anticipated  Heart: RRR, without gallops or rubs  Lungs: clear bilaterally without wheezes, crackles, or rhonchi  Abdomen: soft, nontender, nondistended, bowel sounds present  Mental Status: awake, alert and oriented to person, place and time       Procedure Details     After infomed consent was obtained for the procedure, with all risks and benefits of procedure explained the patient was taken to the endoscopy suite and placed in the left lateral decubitus position. Following sequential administration of sedation as per above, the endoscope was inserted into the mouth and advanced under direct vision to second portion of the duodenum. A careful inspection was made as the gastroscope was withdrawn, including a retroflexed view of the proximal stomach; findings and interventions are described below. Findings:   Esophagus:normal  Stomach: normal   Duodenum/jejunum: normal    Therapies:  esophageal dilation with 15-18mm sized balloon  biopsy of stomach - r/o H.pylori    Specimens: as above           Complications:   None; patient tolerated the procedure well. EBL:  None. Impression:    Normal EGD     Recommendations:    -Continue acid suppression. ,   -Await pathology.  -Proceed with colonoscopy today as planned    Benito Seo MD

## 2021-02-23 NOTE — PERIOP NOTES
0384 Procedure being performed under MAC; SANDI Jones at bedside monitoring patient. See anesthesia notes. 5760 Endoscope was pre-cleaned at bedside immediately following procedure by Renny Rowell. CRE balloon dilatation of the esophagus   15 mm Balloon inflated to 3 ATMs. 16.5 mm Balloon inflated to 4.5 ATMs. 18 mm Balloon inflated to 7 ATMs. No subcutaneous crepitus of the chest or cervical region was noted post dilatation. 9246 Patient received Lidocaine and Propofol, per SANDI Jones. Care of patient assumed from the anesthesia provider. Patient tolerated procedure well. Abdomen remains soft and non tender post procedure, no complaints or indication of discomfort noted at this time. See anesthesia note. Patient transferred to Endoscopy Recovery and report given to recovery nurse.

## 2021-02-23 NOTE — ANESTHESIA PREPROCEDURE EVALUATION
Anesthetic History   No history of anesthetic complications            Review of Systems / Medical History  Patient summary reviewed, nursing notes reviewed and pertinent labs reviewed    Pulmonary              Pertinent negatives: No COPD, asthma and recent URI  Comments: Mild reactive airway disease   Neuro/Psych   Within defined limits           Cardiovascular            Dysrhythmias : atrial fibrillation  Past MI, CAD and cardiac stents    Exercise tolerance: >4 METS  Comments: Hx of parosysmal atrial fibrillation     Stents placed in 2011 and 2014, no recent cardiac s/s   GI/Hepatic/Renal  Within defined limits              Endo/Other  Within defined limits           Other Findings              Physical Exam    Airway  Mallampati: II  TM Distance: 4 - 6 cm  Neck ROM: normal range of motion   Mouth opening: Normal     Cardiovascular    Rhythm: regular  Rate: normal         Dental  No notable dental hx       Pulmonary  Breath sounds clear to auscultation               Abdominal         Other Findings            Anesthetic Plan    ASA: 3  Anesthesia type: MAC            Anesthetic plan and risks discussed with: Patient

## 2021-02-23 NOTE — DISCHARGE INSTRUCTIONS
[address] [facility]. Nas Good M.D.  [phone]                 COLON and EGD DISCHARGE INSTRUCTIONS    2021    Lorie Evangelical  :  1950  Lorna Medical Record Number:  [id number]      DISCOMFORT:  Sore throat- throat lozenges or warm salt water gargle  Redness at IV site- apply warm compress to area; if redness or soreness persist- contact your physician  There may be a slight amount of blood passed from the rectum  Gaseous discomfort- walking, belching will help relieve any discomfort  You may not operate a vehicle for 12 hours  You may not engage in an occupation involving machinery or appliances for rest of today  You may not drink alcoholic beverages for at least 12 hours  Avoid making any critical decisions for at least 24 hour  DIET:   High fiber diet. - however -  remember your colon is empty and a heavy meal will produce gas. Avoid these foods:  vegetables, fried / greasy foods, carbonated drinks for today     ACTIVITY:  You may  resume your normal daily activities it is recommended that you spend the remainder of the day resting -  avoid any strenuous activity and driving. CALL M.D. ANY SIGN OF:   Increasing pain, nausea, vomiting  Abdominal distension (swelling)  New increased bleeding (oral or rectal)  Fever (chills)  Pain in chest area  Bloody discharge from nose or mouth  Shortness of breath      Follow-up Instructions:   Call Dr. Susan Mon if any questions at [phone]. Results of procedure / biopsy in 7 to 10 days, we will call you with these results.   Your endoscopy showed normal exam   Your colonoscopy showed 2 polyps

## 2021-02-23 NOTE — H&P
Minerva Ross M.D.  [phone]            History and Physical       NAME:  Brian Wyman   :   1950   MRN:   [id number]       Referring Physician:  Jose Herr MD      Consult Date: 2021 8:43 AM    Chief Complaint:  Dysphagia & Colon cancer screening    History of Present Illness:   Mr Sophia Porter is a 69year old male who presents for evaluation of difficulty swallowing on the request of his PCP Dr Rachelle Gomez. He reports his difficulty swallowing started about 6 weeks ago. Aba Quiñones has also noted epigastric pain over the past few weeks which is worse after eating. Aba Quiñones has difficulty swallowing both pills and solids but no trouble with liquids.   He has noticed a dry cough recently.  No change in bowel habits or dark tarry stool.  No weight loss.  He was evaluated by his PCP a week ago with Hgb normal.  He was started on Prilosec which seems to have helped.  Tobacco none.  Denies frequent use of NSAIDs.  Prior EGD 30 years ago. Prior colonoscopy 30 years ago. Orquidea Klein family history of colon cancer. PMH:  Past Medical History:   Diagnosis Date    Atrial fibrillation ([address] 2015    Had brief Afib  that resolved on its own withrout obvious recurrence     Back problem     CAD (coronary artery disease)     MI in  (RCA);  PCI to LAD      Dyslipidemia 2011    Embedded metal fragments     chest    Enlarged prostate     MI (myocardial infarction) ([address]     MI in        [address] [place]:  Past Surgical History:   Procedure Laterality Date    HX COLONOSCOPY  approx     HX CORONARY STENT PLACEMENT      HX HEART CATHETERIZATION      1 stents placed, 1 stent -total 2 stents    HX ORTHOPAEDIC      STRESS TEST CARDIOLITE         Allergies:  No Known Allergies    Home Medications:  Prior to Admission Medications   Prescriptions Last Dose Informant Patient Reported? Taking? MEN'S MULTI-VITAMIN PO 2021  Yes Yes   Sig: Take  by mouth. albuterol (PROVENTIL HFA, VENTOLIN HFA, PROAIR HFA) 90 mcg/actuation inhaler 11/23/2020  No Yes   Sig: Take 2 Puffs by inhalation every four (4) hours as needed for Wheezing or Shortness of Breath. aspirin delayed-release 81 mg tablet 2/21/2021  Yes Yes   Sig: Take  by mouth daily. cannabidiol, CBD, extract 100 mg/mL soln 2/21/2021  Yes Yes   Sig: Take  by mouth two (2) times a day. cyclobenzaprine (FLEXERIL) 5 mg tablet Unknown at Unknown time  No No   Sig: TAKE 1 TABLET BY MOUTH ONCE DAILY AS NEEDED FOR MUSCLE SPASMS   omeprazole (PriLOSEC OTC) 20 mg tablet 2/21/2021  Yes Yes   Sig: Take 20 mg by mouth daily. rosuvastatin (CRESTOR) 5 mg tablet 2/21/2021  Yes Yes   Sig: rosuvastatin 5 mg tablet   silodosin (RAPAFLO) 8 mg capsule 2/21/2021  Yes Yes   Sig: Take 8 mg by mouth every other day. tadalafiL (CIALIS) 5 mg tablet 2/21/2021  Yes Yes   Sig: Take 5 mg by mouth daily. ubidecarenone/vitamin E mixed (COQ10  PO) 2/21/2021  Yes Yes   Sig: Take  by mouth. Facility-Administered Medications: None       Hospital Medications:  No current facility-administered medications for this encounter.         Social History:  Social History     Tobacco Use    Smoking status: Never Smoker    Smokeless tobacco: Never Used   Substance Use Topics    Alcohol use: No       Family History:  Family History   Problem Relation Age of Onset    Heart Disease Mother     Heart Disease Father     Heart Disease Sister     Hypertension Sister     Heart Disease Brother     Asthma Brother              Review of Systems:      Constitutional: negative fever, negative chills, negative weight loss  Eyes:   negative visual changes  ENT:   negative sore throat, tongue or lip swelling  Respiratory:  negative cough, negative dyspnea  Cards:  negative for chest pain, palpitations, lower extremity edema  GI:   See HPI  :  negative for frequency, dysuria  Integument:  negative for rash and pruritus  Heme:  negative for easy bruising and gum/nose bleeding  Musculoskel: negative for myalgias,  back pain and muscle weakness  Neuro: negative for headaches, dizziness, vertigo  Psych:  negative for feelings of anxiety, depression       Objective:     Patient Vitals for the past 8 hrs:   BP Temp Pulse Resp SpO2 Height Weight   02/23/21 0804 (!) 157/82 98.1 °F (36.7 °C) 74 14 100 % 5' 10\" (1.778 m) 69.8 kg (153 lb 14.1 oz)     No intake/output data recorded. No intake/output data recorded. EXAM:     NEURO-a&o   HEENT-wnl   LUNGS-clear    COR-regular rate and rhythym     ABD-soft , no tenderness, no rebound, good bs     EXT-no edema     Data Review     No results for input(s): WBC, HGB, HCT, PLT, HGBEXT, HCTEXT, PLTEXT in the last 72 hours. No results for input(s): NA, K, CL, CO2, BUN, CREA, GLU, PHOS, CA in the last 72 hours. No results for input(s): AP, TBIL, TP, ALB, GLOB, GGT, AML, LPSE in the last 72 hours. No lab exists for component: SGOT, GPT, AMYP, HLPSE  No results for input(s): INR, PTP, APTT, INREXT in the last 72 hours. Patient Active Problem List   Diagnosis Code    Coronary artery disease I25.10    Dyslipidemia E78.5    PAF ([facility]oxysmal atrial fibrillation) ([facility] I48.0    Dyspepsia R10.13      Assessment:   · Dysphagia  · Colon cancer screening   Plan:   · EGD  · Colonoscopy today.      Signed By: Suzie Moctezuma MD     2/23/2021  8:43 AM

## 2021-07-12 DIAGNOSIS — M62.838 MUSCLE SPASM: ICD-10-CM

## 2021-07-12 RX ORDER — CYCLOBENZAPRINE HCL 5 MG
TABLET ORAL
Qty: 30 TABLET | Refills: 5 | Status: SHIPPED | OUTPATIENT
Start: 2021-07-12 | End: 2022-01-25 | Stop reason: SDUPTHER

## 2022-01-18 ENCOUNTER — TELEPHONE (OUTPATIENT)
Dept: FAMILY MEDICINE CLINIC | Age: 72
End: 2022-01-18

## 2022-01-18 NOTE — TELEPHONE ENCOUNTER
----- Message from Sarah Dec sent at 2022  2:07 PM EST -----  Subject: Appointment Request    Reason for Call: New Patient Request Appointment    QUESTIONS  Type of Appointment? Established Patient  Reason for appointment request? No appointments available during search  Additional Information for Provider? Pt. Siva Landa; Previous provider   Jacob Murphy MD; needs to be established with a physican; and needs   a physical ASAP  ---------------------------------------------------------------------------  --------------  CALL BACK INFO  What is the best way for the office to contact you? OK to leave message on   voicemail  Preferred Call Back Phone Number? [phone]  ---------------------------------------------------------------------------  --------------  SCRIPT ANSWERS  Relationship to Patient? Other  Representative Name? [place]  Additional information verified (besides Name and Date of Birth)? Address  Specialty Confirmation? Primary Care  Is this the first appointment to establish care for a ? No  Have you been diagnosed with, awaiting test results for, or told that you   are suspected of having COVID-19 (Coronavirus)? (If patient has tested   negative or was tested as a requirement for work, school, or travel and   not based on symptoms, answer no)? No  Within the past two weeks have you developed any of the following symptoms   (answer no if symptoms have been present longer than 2 weeks or began   more than 2 weeks ago)? Fever or Chills, Cough, Shortness of breath or   difficulty breathing, Loss of taste or smell, Sore throat, Nasal   congestion, Sneezing or runny nose, Fatigue or generalized body aches   (answer no if pain is specific to a body part e.g. back pain), Diarrhea,   Headache? No  Have you had close contact with someone with COVID-19 in the last 14 days? No  (Service Expert  click yes below to proceed with [place]   Scheduling)?  Yes

## 2022-01-18 NOTE — TELEPHONE ENCOUNTER
Called pt to schedule appt. , he told me to call Tisha Nixon. Tisha Nixon was upset and said it was unacceptable for pt to have to wait until 02/10 for a new to provider appt. Tisha Nixon would like to know if pt could be worked in sooner than 02/10 bc he was a pt of Dr Misha Arambula and this is his PCP office.

## 2022-01-19 NOTE — TELEPHONE ENCOUNTER
Keep appt for new pt in Feb unless he wants to see another provider. Let us know if he needs med refill prior to appt so he does not run out of meds.

## 2022-01-20 NOTE — TELEPHONE ENCOUNTER
LVM for patient to keep Feb appointment and let us know if there are any refills needed before appointment.

## 2022-01-25 ENCOUNTER — VIRTUAL VISIT (OUTPATIENT)
Dept: FAMILY MEDICINE CLINIC | Age: 72
End: 2022-01-25
Payer: MEDICARE

## 2022-01-25 DIAGNOSIS — U07.1 LAB TEST POSITIVE FOR DETECTION OF COVID-19 VIRUS: Primary | ICD-10-CM

## 2022-01-25 DIAGNOSIS — J06.9 URI WITH COUGH AND CONGESTION: ICD-10-CM

## 2022-01-25 DIAGNOSIS — M62.838 MUSCLE SPASM: ICD-10-CM

## 2022-01-25 DIAGNOSIS — I48.0 PAF (PAROXYSMAL ATRIAL FIBRILLATION) (HCC): ICD-10-CM

## 2022-01-25 DIAGNOSIS — E78.5 DYSLIPIDEMIA: ICD-10-CM

## 2022-01-25 PROCEDURE — 99443 PR PHYS/QHP TELEPHONE EVALUATION 21-30 MIN: CPT | Performed by: INTERNAL MEDICINE

## 2022-01-25 RX ORDER — ALBUTEROL SULFATE 90 UG/1
2 AEROSOL, METERED RESPIRATORY (INHALATION)
Qty: 1 EACH | Refills: 5 | Status: SHIPPED | OUTPATIENT
Start: 2022-01-25

## 2022-01-25 RX ORDER — DOXYCYCLINE 100 MG/1
CAPSULE ORAL
COMMUNITY
Start: 2022-01-19

## 2022-01-25 RX ORDER — CYCLOBENZAPRINE HCL 5 MG
TABLET ORAL
Qty: 30 TABLET | Refills: 5 | Status: SHIPPED | OUTPATIENT
Start: 2022-01-25

## 2022-01-25 RX ORDER — IBUPROFEN 800 MG/1
TABLET ORAL
COMMUNITY
Start: 2022-01-19

## 2022-01-25 NOTE — PROGRESS NOTES
Chief Complaint   Patient presents with   Lali Palacios, who was evaluated through a synchronous (real-time) audio only encounter, and/or his healthcare decision maker, is aware that it is a billable service, which includes applicable co-pays, with coverage as determined by his insurance carrier. He provided verbal consent to proceed and patient identification was verified. This visit was conducted pursuant to the emergency declaration under the [facility], [address] [place] [address] and the FRS and [employer] General Act. A caregiver was present when appropriate. Ability to conduct physical exam was limited. The patient was located at home in a state where the provider was licensed to provide care. --Yoni Workman MD on 1/29/2022 at 12:00 PM      Assessment & Plan:   Diagnoses and all orders for this visit:    1. Lab test positive for detection of COVID-19 virus   Continue to monitor symptoms. Information given for [employer]   Vitamin D, C and Zinc   Fluids. Worrisome symptoms discussed in detail. 2. PAF (paroxysmal atrial fibrillation) ([facility]  Assessment & Plan:  monitored by specialist. No acute findings meriting change in the plan  See's 1 x per year. 3. Dyslipidemia  -     METABOLIC PANEL, COMPREHENSIVE; Future  -     CBC WITH AUTOMATED DIFF; Future  -     LIPID PANEL; Future    4. URI with cough and congestion  -     albuterol (PROVENTIL HFA, VENTOLIN HFA, PROAIR HFA) 90 mcg/actuation inhaler; Take 2 Puffs by inhalation every four (4) hours as needed for Wheezing or Shortness of Breath. 5. Muscle spasm  -     cyclobenzaprine (FLEXERIL) 5 mg tablet; TAKE 1 (ONE) TABLET BY MOUTH ONCE DAILY AS NEEDED FOR MUSCLE SPASM    I spent at least 21 minutes on this visit with this established patient. We discussed the expected course, resolution and complications of the diagnosis(es) in detail. Medication risks, benefits, costs, interactions, and alternatives were discussed as indicated. I advised him to contact the office if his condition worsens, changes or fails to improve as anticipated. He expressed understanding with the diagnosis(es) and plan. Subjective:   71M who has been vaccinated, but no boosted who presents with onset of COVID-19 symptoms. He positive COVID-19 test on Saturday (3 days ago). He denies fever or chills. He does have fatigue and malaise. URI type symptoms. We also reviewed his [facility] and brought up to reconcile. Patient Active Problem List    Diagnosis Date Noted    Dyspepsia 10/27/2015    PAF (paroxysmal atrial fibrillation) (HCC) 03/09/2015    Dyslipidemia 07/25/2011    Coronary artery disease 06/22/2011     Current Outpatient Medications   Medication Sig Dispense Refill    doxycycline (VIBRAMYCIN) 100 mg capsule TAKE 1 CAPSULE BY MOUTH TWICE DAILY FOR 10 DAYS      ibuprofen (MOTRIN) 800 mg tablet TAKE 1 TABLET BY MOUTH EVERY 6 TO 8 HOURS AS NEEDED      albuterol (PROVENTIL HFA, VENTOLIN HFA, PROAIR HFA) 90 mcg/actuation inhaler Take 2 Puffs by inhalation every four (4) hours as needed for Wheezing or Shortness of Breath. 1 Each 5    cyclobenzaprine (FLEXERIL) 5 mg tablet TAKE 1 (ONE) TABLET BY MOUTH ONCE DAILY AS NEEDED FOR MUSCLE SPASM 30 Tablet 5    rosuvastatin (CRESTOR) 5 mg tablet rosuvastatin 5 mg tablet      aspirin delayed-release 81 mg tablet Take  by mouth daily.  omeprazole (PriLOSEC OTC) 20 mg tablet Take 20 mg by mouth daily.  cannabidiol, CBD, extract 100 mg/mL soln Take  by mouth two (2) times a day.  ubidecarenone/vitamin E mixed (COQ10  PO) Take  by mouth.  tadalafiL (CIALIS) 5 mg tablet Take 5 mg by mouth daily.  MEN'S MULTI-VITAMIN PO Take  by mouth.  silodosin (RAPAFLO) 8 mg capsule Take 8 mg by mouth every other day.        No Known Allergies  Past Medical History:   Diagnosis Date    Atrial fibrillation ([facility] 03/09/2015    Had brief Afib 2015 that resolved on its own withrout obvious recurrence     Back problem     CAD (coronary artery disease)     MI in 2011 (RCA);  PCI to LAD 2015     Dyslipidemia 7/25/2011    Embedded metal fragments     chest    Enlarged prostate     MI (myocardial infarction) ([facility]     MI in 2011     Past Surgical History:   Procedure Laterality Date    COLONOSCOPY N/A 2/23/2021    COLONOSCOPY performed by Pedro Palacio MD at [address] [facility] HX COLONOSCOPY  approx 1991    HX CORONARY STENT PLACEMENT      HX HEART CATHETERIZATION  2011    1 stents placed, 1 stent 2014-total 2 stents    HX ORTHOPAEDIC      STRESS TEST CARDIOLITE       Family History   Problem Relation Age of Onset    Heart Disease Mother     Heart Disease Father     Heart Disease Sister     Hypertension Sister     Heart Disease Brother     Asthma Brother      Social History     Tobacco Use    Smoking status: Never Smoker    Smokeless tobacco: Never Used   Substance Use Topics    Alcohol use: No       Review of Systems   Constitutional: Positive for malaise/fatigue. HENT: Positive for congestion and sinus pain. Eyes: Negative. Respiratory: Negative. Cardiovascular: Negative. Gastrointestinal: Negative. Genitourinary: Negative. Musculoskeletal: Negative. Skin: Negative. Neurological: Negative. Endo/Heme/Allergies: Negative. Psychiatric/Behavioral: Negative. All other systems reviewed and are negative. Objective: There were no vitals taken for this visit.        General: alert, cooperative, no distress   Mental  status: normal mood, behavior, speech and thought processes, able to follow commands   Psychiatric: normal affect, consistent with stated mood, no evidence of hallucinations

## 2022-02-07 ENCOUNTER — OFFICE VISIT (OUTPATIENT)
Dept: FAMILY MEDICINE CLINIC | Age: 72
End: 2022-02-07
Payer: MEDICARE

## 2022-02-07 VITALS
WEIGHT: 152 LBS | DIASTOLIC BLOOD PRESSURE: 72 MMHG | HEIGHT: 70 IN | TEMPERATURE: 97.1 F | RESPIRATION RATE: 22 BRPM | SYSTOLIC BLOOD PRESSURE: 136 MMHG | OXYGEN SATURATION: 99 % | HEART RATE: 64 BPM | BODY MASS INDEX: 21.76 KG/M2

## 2022-02-07 DIAGNOSIS — R03.0 ELEVATED BLOOD PRESSURE READING: ICD-10-CM

## 2022-02-07 DIAGNOSIS — Z00.00 MEDICARE ANNUAL WELLNESS VISIT, SUBSEQUENT: Primary | ICD-10-CM

## 2022-02-07 DIAGNOSIS — E78.5 DYSLIPIDEMIA: ICD-10-CM

## 2022-02-07 DIAGNOSIS — I48.0 PAF (PAROXYSMAL ATRIAL FIBRILLATION) (HCC): ICD-10-CM

## 2022-02-07 DIAGNOSIS — E55.9 VITAMIN D DEFICIENCY: ICD-10-CM

## 2022-02-07 PROCEDURE — G0439 PPPS, SUBSEQ VISIT: HCPCS | Performed by: INTERNAL MEDICINE

## 2022-02-07 PROCEDURE — 99214 OFFICE O/P EST MOD 30 MIN: CPT | Performed by: INTERNAL MEDICINE

## 2022-02-07 PROCEDURE — 1101F PT FALLS ASSESS-DOCD LE1/YR: CPT | Performed by: INTERNAL MEDICINE

## 2022-02-07 PROCEDURE — G8536 NO DOC ELDER MAL SCRN: HCPCS | Performed by: INTERNAL MEDICINE

## 2022-02-07 PROCEDURE — 3017F COLORECTAL CA SCREEN DOC REV: CPT | Performed by: INTERNAL MEDICINE

## 2022-02-07 PROCEDURE — G8427 DOCREV CUR MEDS BY ELIG CLIN: HCPCS | Performed by: INTERNAL MEDICINE

## 2022-02-07 PROCEDURE — G8510 SCR DEP NEG, NO PLAN REQD: HCPCS | Performed by: INTERNAL MEDICINE

## 2022-02-07 PROCEDURE — G8420 CALC BMI NORM PARAMETERS: HCPCS | Performed by: INTERNAL MEDICINE

## 2022-02-07 RX ORDER — ASCORBIC ACID 500 MG
1000 TABLET ORAL DAILY
COMMUNITY

## 2022-02-07 NOTE — PROGRESS NOTES
Identified pt with two pt identifiers(name and ). Chief Complaint   Patient presents with    Annual Wellness Visit    Coronary Artery Disease     not fasting        Health Maintenance Due   Topic    COVID-19 Vaccine (3 - Booster for Moderna series)    Lipid Screen     Medicare Yearly Exam        Wt Readings from Last 3 Encounters:   22 152 lb (68.9 kg)   21 153 lb 14.1 oz (69.8 kg)   21 160 lb (72.6 kg)     Temp Readings from Last 3 Encounters:   22 97.1 °F (36.2 °C) (Temporal)   21 97.7 °F (36.5 °C)   21 97.8 °F (36.6 °C) (Oral)     BP Readings from Last 3 Encounters:   22 136/72   21 (!) 147/69   21 122/62     Pulse Readings from Last 3 Encounters:   22 64   21 68   21 71         Learning Assessment:  :     Learning Assessment 10/27/2015 2015   PRIMARY LEARNER Patient Patient   HIGHEST LEVEL OF EDUCATION - PRIMARY LEARNER  GRADUATED HIGH SCHOOL OR GED -   BARRIERS PRIMARY LEARNER NONE -   CO-LEARNER CAREGIVER No -   PRIMARY LANGUAGE ENGLISH ENGLISH   LEARNER PREFERENCE PRIMARY OTHER (COMMENT) LISTENING   ANSWERED BY self patient   RELATIONSHIP SELF SELF       Depression Screening:  :     3 most recent PHQ Screens 2022   Little interest or pleasure in doing things Not at all   Feeling down, depressed, irritable, or hopeless Not at all   Total Score PHQ 2 0       Fall Risk Assessment:  :     Fall Risk Assessment, last 12 mths 2022   Able to walk? Yes   Fall in past 12 months? 0   Do you feel unsteady? 0   Are you worried about falling 0       Abuse Screening:  :     Abuse Screening Questionnaire 2022 2021 1/10/2020 7/10/2018 12/15/2017 10/27/2015   Do you ever feel afraid of your partner? N N N N N N   Are you in a relationship with someone who physically or mentally threatens you? N N N N N N   Is it safe for you to go home?  Y Y Y Y Y Y       Coordination of Care Questionnaire:  :     1) Have you been to an emergency room, urgent care clinic since your last visit? yes [facility] 1/19/22  Hospitalized since your last visit? no             2) Have you seen or consulted any other health care providers outside of [address] [place] [address] since your last visit? no  (Include any pap smears or colon screenings in this section.)    3) Do you have an Advance Directive on file? yes  Are you interested in receiving information about Advance Directives? no    Patient is accompanied by wife I have received verbal consent from Torrye Villalobos to discuss any/all medical information while they are present in the room. Reviewed record in preparation for visit and have obtained necessary documentation.

## 2022-02-07 NOTE — PATIENT INSTRUCTIONS
Well Visit, Over 72: Care Instructions  Overview     Well visits can help you stay healthy. Your doctor has checked your overall health and may have suggested ways to take good care of yourself. Your doctor also may have recommended tests. At home, you can help prevent illness with healthy eating, regular exercise, and other steps. Follow-up care is a key part of your treatment and safety. Be sure to make and go to all appointments, and call your doctor if you are having problems. It's also a good idea to know your test results and keep a list of the medicines you take. How can you care for yourself at home? · Get screening tests that you and your doctor decide on. Screening helps find diseases before any symptoms appear. · Eat healthy foods. Choose fruits, vegetables, whole grains, protein, and low-fat dairy foods. Limit fat, especially saturated fat. Reduce salt in your diet. · Limit alcohol. If you are a man, have no more than 2 drinks a day or 14 drinks a week. If you are a woman, have no more than 1 drink a day or 7 drinks a week. Since alcohol affects older adults differently, you may want to limit alcohol even more. Or you may not want to drink at all. · Get at least 30 minutes of exercise on most days of the week. Walking is a good choice. You also may want to do other activities, such as running, swimming, cycling, or playing tennis or team sports. · Reach and stay at a healthy weight. This will lower your risk for many problems, such as obesity, diabetes, heart disease, and high blood pressure. · Do not smoke. Smoking can make health problems worse. If you need help quitting, talk to your doctor about stop-smoking programs and medicines. These can increase your chances of quitting for good. · Care for your mental health. It is easy to get weighed down by worry and stress. Learn strategies to manage stress, like deep breathing and mindfulness, and stay connected with your family and community. If you find you often feel sad or hopeless, talk with your doctor. Treatment can help. · Talk to your doctor about whether you have any risk factors for sexually transmitted infections (STIs). You can help prevent STIs if you wait to have sex with a new partner (or partners) until you've each been tested for STIs. It also helps if you use condoms (male or female condoms) and if you limit your sex partners to one person who only has sex with you. Vaccines are available for some STIs. · If you think you may have a problem with alcohol or drug use, talk to your doctor. This includes prescription medicines (such as amphetamines and opioids) and illegal drugs (such as cocaine and methamphetamine). Your doctor can help you figure out what type of treatment is best for you. · Protect your skin from too much sun. When you're outdoors from 10 a.m. to 4 p.m., stay in the shade or cover up with clothing and a hat with a wide brim. Wear sunglasses that block UV rays. Even when it's cloudy, put broad-spectrum sunscreen (SPF 30 or higher) on any exposed skin. · See a dentist one or two times a year for checkups and to have your teeth cleaned. · Wear a seat belt in the car. When should you call for help? Watch closely for changes in your health, and be sure to contact your doctor if you have any problems or symptoms that concern you. Where can you learn more? Go to http://www.gray.com/  Enter [id number] in the search box to learn more about \"Well Visit, Over 65: Care Instructions. \"  Current as of: February 11, 2021               Content Version: 13.0  © 3236-5884 Healthwise, [employer]orporated. Care instructions adapted under license by [employer] (which disclaims liability or warranty for this information).  If you have questions about a medical condition or this instruction, always ask your healthcare professional. [address] any warranty or liability for your use of this information.

## 2022-02-07 NOTE — PROGRESS NOTES
CC:  Chief Complaint   Patient presents with    Annual Wellness Visit    Coronary Artery Disease     not fasting       This is the Subsequent Medicare Annual Wellness Exam, performed 12 months or more after the Initial AWV or the last Subsequent AWV    I have reviewed the patient's medical history in detail and updated the computerized patient record. 71M who presents with his wife for follow up and initial visit with me. He had previously been under the care of Dr. Neha Mckee. He reports doing well. Has a h/o HLD, HTN and CAD. He is followed by several specialist including urology, cardiology and neurology. HM is UTD. He reports that he had COVID-19 [place] in the year. Feeling better now, but has lingering cough that comes and goes. He has no other major concerns or complaints. Fasting for labs. Assessment/Plan   Education and counseling provided:  Are appropriate based on today's review and evaluation  End-of-Life planning (with patient's consent)  Pneumococcal Vaccine  Influenza Vaccine  Prostate cancer screening tests (PSA, covered annually)  Colorectal cancer screening tests  Cardiovascular screening blood test  Screening for glaucoma  Diabetes screening test    Diagnoses and all orders for this visit:    1. Medicare annual wellness visit, subsequent   Anticipatory guidance discussed. Immunizations reviewed   HM updated. 2. Dyslipidemia  -     METABOLIC PANEL, COMPREHENSIVE; Future  -     CBC WITH AUTOMATED DIFF; Future  -     LIPID PANEL; Future    3. Vitamin D deficiency  -     VITAMIN D, 25 HYDROXY; Future    4. PAF (paroxysmal atrial fibrillation) (HCC)   NSR. Followed by cardiology. 5. Elevated blood pressure reading   But currently not on BP medications. Will follow. I have discussed the diagnosis with the patient and the intended treatment plan as seen in the above orders.  The patient has received an after-visit summary and questions were answered concerning future plans. Asked to return should symptoms worsen or not improve with treatment. Any pending labs and studies will be relayed to patient when they become available. Pt verbalizes understanding of plan of care and denies further questions or concerns at this time. Follow-up and Dispositions    · Return in 1 year (on 2/7/2023), or if symptoms worsen or fail to improve, for Follow up 6 months for chronic issues. Depression Risk Factor Screening     3 most recent PHQ Screens 2/7/2022   Little interest or pleasure in doing things Not at all   Feeling down, depressed, irritable, or hopeless Not at all   Total Score PHQ 2 0       Alcohol & Drug Abuse Risk Screen    Do you average more than 1 drink per night or more than 7 drinks a week: No  In the past three months have you have had more than 4 drinks containing alcohol on one occasion: No          Functional Ability and Level of Safety    Hearing: Hearing is good. Activities of Daily Living: The home contains: no safety equipment. Patient does total self care      Ambulation: with no difficulty     Fall Risk:  Fall Risk Assessment, last 12 mths 1/25/2022   Able to walk? Yes   Fall in past 12 months? 0   Do you feel unsteady? 0   Are you worried about falling 0      Abuse Screen:  Patient is not abused       Cognitive Screening    Has your family/caregiver stated any concerns about your memory: no   Cognitive Screening: Normal - Clock Drawing Test, Mini Cog Test    OBJECTIVE:  General appearance: alert, well appearing, and in no distress and normal appearing weight. Chest: clear to auscultation, no wheezes, rales or rhonchi, symmetric air entry. CVS exam: normal rate, regular rhythm, normal S1, S2, no murmurs, rubs, clicks or gallops. Abdominal exam: soft, nontender, nondistended, no masses or organomegaly.   Exam of extremities: peripheral pulses normal, no pedal edema, no clubbing or cyanosis  Skin exam - normal coloration and turgor, no rashes, no suspicious skin lesions noted. Neurological exam reveals alert, oriented, normal speech, no focal findings or movement disorder noted. Health Maintenance Due     Health Maintenance Due   Topic Date Due    COVID-19 Vaccine (3 - Booster for Moderna series) 09/13/2021    Lipid Screen  02/05/2022    Medicare Yearly Exam  02/06/2022       Patient Care Team   Patient Care Team:  King Encarnacion MD as PCP - General (Pediatric Medicine)  King Encarnacion MD as PCP - [facility]  Latha Dodd MD (Urology)  Lida Melendez MD as Physician (Cardiology)  Jason Silva MD as Physician (Dermatology)    History     Patient Active Problem List   Diagnosis Code    Coronary artery disease I25.10    Dyslipidemia E78.5    PAF (paroxysmal atrial fibrillation) ([address] I48.0    Dyspepsia R10.13     Past Medical History:   Diagnosis Date    Atrial fibrillation ([address] 03/09/2015    Had brief Afib 2015 that resolved on its own withrout obvious recurrence     Back problem     CAD (coronary artery disease)     MI in 2011 (RCA);  PCI to LAD 2015     Dyslipidemia 7/25/2011    Embedded metal fragments     chest    Enlarged prostate     MI (myocardial infarction) ([address]     MI in 2011      Past Surgical History:   Procedure Laterality Date    COLONOSCOPY N/A 2/23/2021    COLONOSCOPY performed by Lina Arevalo MD at [facility] HX COLONOSCOPY  approx 1991    HX CORONARY STENT PLACEMENT      HX HEART CATHETERIZATION  2011    1 stents placed, 1 stent 2014-total 2 stents    HX ORTHOPAEDIC      STRESS TEST CARDIOLITE       Current Outpatient Medications   Medication Sig Dispense Refill    zinc 50 mg tab tablet Take 50 mg by mouth daily.  ascorbic acid, vitamin C, (Vitamin C) 500 mg tablet Take 1,000 mg by mouth daily.       ibuprofen (MOTRIN) 800 mg tablet TAKE 1 TABLET BY MOUTH EVERY 6 TO 8 HOURS AS NEEDED      albuterol (PROVENTIL HFA, VENTOLIN HFA, PROAIR HFA) 90 mcg/actuation inhaler Take 2 Puffs by inhalation every four (4) hours as needed for Wheezing or Shortness of Breath. 1 Each 5    cyclobenzaprine (FLEXERIL) 5 mg tablet TAKE 1 (ONE) TABLET BY MOUTH ONCE DAILY AS NEEDED FOR MUSCLE SPASM 30 Tablet 5    tadalafiL (CIALIS) 5 mg tablet Take 5 mg by mouth daily as needed.  rosuvastatin (CRESTOR) 5 mg tablet rosuvastatin 5 mg tablet      aspirin delayed-release 81 mg tablet Take  by mouth daily.  MEN'S MULTI-VITAMIN PO Take  by mouth.  cannabidiol, CBD, extract 100 mg/mL soln Take  by mouth two (2) times a day.  ubidecarenone/vitamin E mixed (COQ10  PO) Take  by mouth.  silodosin (RAPAFLO) 8 mg capsule Take 8 mg by mouth every other day.  doxycycline (VIBRAMYCIN) 100 mg capsule TAKE 1 CAPSULE BY MOUTH TWICE DAILY FOR 10 DAYS (Patient not taking: Reported on 2/7/2022)      omeprazole (PriLOSEC OTC) 20 mg tablet Take 20 mg by mouth daily. (Patient not taking: Reported on 2/7/2022)       No Known Allergies    Family History   Problem Relation Age of Onset    Heart Disease Mother     Heart Disease Father     Heart Disease Sister     Hypertension Sister     Heart Disease Brother     Asthma Brother      Social History     Tobacco Use    Smoking status: Never Smoker    Smokeless tobacco: Never Used   Substance Use Topics    Alcohol use: No     Vesna Finley MD     Patient Instructions        Well Visit, Over 72: Care Instructions  Overview     Well visits can help you stay healthy. Your doctor has checked your overall health and may have suggested ways to take good care of yourself. Your doctor also may have recommended tests. At home, you can help prevent illness with healthy eating, regular exercise, and other steps. Follow-up care is a key part of your treatment and safety. Be sure to make and go to all appointments, and call your doctor if you are having problems.  It's also a good idea to know your test results and keep a list of the medicines you take.  How can you care for yourself at home? · Get screening tests that you and your doctor decide on. Screening helps find diseases before any symptoms appear. · Eat healthy foods. Choose fruits, vegetables, whole grains, protein, and low-fat dairy foods. Limit fat, especially saturated fat. Reduce salt in your diet. · Limit alcohol. If you are a man, have no more than 2 drinks a day or 14 drinks a week. If you are a woman, have no more than 1 drink a day or 7 drinks a week. Since alcohol affects older adults differently, you may want to limit alcohol even more. Or you may not want to drink at all. · Get at least 30 minutes of exercise on most days of the week. Walking is a good choice. You also may want to do other activities, such as running, swimming, cycling, or playing tennis or team sports. · Reach and stay at a healthy weight. This will lower your risk for many problems, such as obesity, diabetes, heart disease, and high blood pressure. · Do not smoke. Smoking can make health problems worse. If you need help quitting, talk to your doctor about stop-smoking programs and medicines. These can increase your chances of quitting for good. · Care for your mental health. It is easy to get weighed down by worry and stress. Learn strategies to manage stress, like deep breathing and mindfulness, and stay connected with your family and community. If you find you often feel sad or hopeless, talk with your doctor. Treatment can help. · Talk to your doctor about whether you have any risk factors for sexually transmitted infections (STIs). You can help prevent STIs if you wait to have sex with a new partner (or partners) until you've each been tested for STIs. It also helps if you use condoms (male or female condoms) and if you limit your sex partners to one person who only has sex with you. Vaccines are available for some STIs. · If you think you may have a problem with alcohol or drug use, talk to your doctor. This includes prescription medicines (such as amphetamines and opioids) and illegal drugs (such as cocaine and methamphetamine). Your doctor can help you figure out what type of treatment is best for you. · Protect your skin from too much sun. When you're outdoors from 10 a.m. to 4 p.m., stay in the shade or cover up with clothing and a hat with a wide brim. Wear sunglasses that block UV rays. Even when it's cloudy, put broad-spectrum sunscreen (SPF 30 or higher) on any exposed skin. · See a dentist one or two times a year for checkups and to have your teeth cleaned. · Wear a seat belt in the car. When should you call for help? Watch closely for changes in your health, and be sure to contact your doctor if you have any problems or symptoms that concern you. Where can you learn more? Go to http://argentina-hasmukh.info/  Enter [id number] in the search box to learn more about \"Well Visit, Over 65: Care Instructions. \"  Current as of: February 11, 2021               Content Version: 13.0  © 8945-0111 [employer]. Care instructions adapted under license by [employer] (which disclaims liability or warranty for this information). If you have questions about a medical condition or this instruction, always ask your healthcare professional. [address] any warranty or liability for your use of this information. No

## 2022-02-08 ENCOUNTER — LAB ONLY (OUTPATIENT)
Dept: FAMILY MEDICINE CLINIC | Age: 72
End: 2022-02-08

## 2022-02-08 DIAGNOSIS — E78.5 DYSLIPIDEMIA: ICD-10-CM

## 2022-02-08 DIAGNOSIS — E55.9 VITAMIN D DEFICIENCY: ICD-10-CM

## 2022-02-09 LAB
25(OH)D3 SERPL-MCNC: 38.6 NG/ML (ref 30–100)
ALBUMIN SERPL-MCNC: 3.9 G/DL (ref 3.5–5)
ALBUMIN/GLOB SERPL: 1.4 {RATIO} (ref 1.1–2.2)
ALP SERPL-CCNC: 68 U/L (ref 45–117)
ALT SERPL-CCNC: 26 U/L (ref 12–78)
ANION GAP SERPL CALC-SCNC: 4 MMOL/L (ref 5–15)
AST SERPL-CCNC: 16 U/L (ref 15–37)
BASOPHILS # BLD: 0 K/UL (ref 0–0.1)
BASOPHILS NFR BLD: 0 % (ref 0–1)
BILIRUB SERPL-MCNC: 0.6 MG/DL (ref 0.2–1)
BUN SERPL-MCNC: 19 MG/DL (ref 6–20)
BUN/CREAT SERPL: 19 (ref 12–20)
CALCIUM SERPL-MCNC: 8.8 MG/DL (ref 8.5–10.1)
CHLORIDE SERPL-SCNC: 105 MMOL/L (ref 97–108)
CHOLEST SERPL-MCNC: 162 MG/DL
CO2 SERPL-SCNC: 30 MMOL/L (ref 21–32)
CREAT SERPL-MCNC: 1.02 MG/DL (ref 0.7–1.3)
DIFFERENTIAL METHOD BLD: ABNORMAL
EOSINOPHIL # BLD: 0 K/UL (ref 0–0.4)
EOSINOPHIL NFR BLD: 0 % (ref 0–7)
ERYTHROCYTE [DISTWIDTH] IN BLOOD BY AUTOMATED COUNT: 13.1 % (ref 11.5–14.5)
GLOBULIN SER CALC-MCNC: 2.7 G/DL (ref 2–4)
GLUCOSE SERPL-MCNC: 102 MG/DL (ref 65–100)
HCT VFR BLD AUTO: 45.2 % (ref 36.6–50.3)
HDLC SERPL-MCNC: 62 MG/DL
HDLC SERPL: 2.6 {RATIO} (ref 0–5)
HGB BLD-MCNC: 14.1 G/DL (ref 12.1–17)
IMM GRANULOCYTES # BLD AUTO: 0.1 K/UL (ref 0–0.04)
IMM GRANULOCYTES NFR BLD AUTO: 1 % (ref 0–0.5)
LDLC SERPL CALC-MCNC: 88.8 MG/DL (ref 0–100)
LYMPHOCYTES # BLD: 0.8 K/UL (ref 0.8–3.5)
LYMPHOCYTES NFR BLD: 14 % (ref 12–49)
MCH RBC QN AUTO: 31.3 PG (ref 26–34)
MCHC RBC AUTO-ENTMCNC: 31.2 G/DL (ref 30–36.5)
MCV RBC AUTO: 100.2 FL (ref 80–99)
MONOCYTES # BLD: 0.5 K/UL (ref 0–1)
MONOCYTES NFR BLD: 9 % (ref 5–13)
NEUTS SEG # BLD: 4.1 K/UL (ref 1.8–8)
NEUTS SEG NFR BLD: 76 % (ref 32–75)
NRBC # BLD: 0 K/UL (ref 0–0.01)
NRBC BLD-RTO: 0 PER 100 WBC
PLATELET # BLD AUTO: 289 K/UL (ref 150–400)
PMV BLD AUTO: 9.8 FL (ref 8.9–12.9)
POTASSIUM SERPL-SCNC: 4.1 MMOL/L (ref 3.5–5.1)
PROT SERPL-MCNC: 6.6 G/DL (ref 6.4–8.2)
RBC # BLD AUTO: 4.51 M/UL (ref 4.1–5.7)
RBC MORPH BLD: ABNORMAL
SODIUM SERPL-SCNC: 139 MMOL/L (ref 136–145)
TRIGL SERPL-MCNC: 56 MG/DL (ref ?–150)
VLDLC SERPL CALC-MCNC: 11.2 MG/DL
WBC # BLD AUTO: 5.5 K/UL (ref 4.1–11.1)

## 2022-02-11 ENCOUNTER — TELEPHONE (OUTPATIENT)
Dept: FAMILY MEDICINE CLINIC | Age: 72
End: 2022-02-11

## 2022-02-11 NOTE — TELEPHONE ENCOUNTER
----- Message from Laron Arias MD sent at 2/11/2022  9:28 AM EST -----  Please let patient know that his labs are stable/normal without any worrisome findings at this time. Continue with medications as prescribed. RTC every 4-6 months or as needed. Continue with current medications. Thanks!

## 2022-02-11 NOTE — PROGRESS NOTES
Please let patient know that his labs are stable/normal without any worrisome findings at this time. Continue with medications as prescribed. RTC every 4-6 months or as needed. Continue with current medications. Thanks!

## 2022-05-11 ENCOUNTER — OFFICE VISIT (OUTPATIENT)
Dept: FAMILY MEDICINE CLINIC | Age: 72
End: 2022-05-11
Payer: MEDICARE

## 2022-05-11 VITALS
SYSTOLIC BLOOD PRESSURE: 132 MMHG | HEIGHT: 70 IN | BODY MASS INDEX: 21.9 KG/M2 | HEART RATE: 86 BPM | OXYGEN SATURATION: 96 % | TEMPERATURE: 98.1 F | WEIGHT: 153 LBS | DIASTOLIC BLOOD PRESSURE: 82 MMHG | RESPIRATION RATE: 20 BRPM

## 2022-05-11 DIAGNOSIS — Z01.818 PRE-OP EVALUATION: ICD-10-CM

## 2022-05-11 DIAGNOSIS — H25.9 AGE-RELATED CATARACT OF BOTH EYES, UNSPECIFIED AGE-RELATED CATARACT TYPE: Primary | ICD-10-CM

## 2022-05-11 PROCEDURE — G8427 DOCREV CUR MEDS BY ELIG CLIN: HCPCS | Performed by: INTERNAL MEDICINE

## 2022-05-11 PROCEDURE — G8536 NO DOC ELDER MAL SCRN: HCPCS | Performed by: INTERNAL MEDICINE

## 2022-05-11 PROCEDURE — 99213 OFFICE O/P EST LOW 20 MIN: CPT | Performed by: INTERNAL MEDICINE

## 2022-05-11 PROCEDURE — G8420 CALC BMI NORM PARAMETERS: HCPCS | Performed by: INTERNAL MEDICINE

## 2022-05-11 PROCEDURE — 3017F COLORECTAL CA SCREEN DOC REV: CPT | Performed by: INTERNAL MEDICINE

## 2022-05-11 PROCEDURE — 1101F PT FALLS ASSESS-DOCD LE1/YR: CPT | Performed by: INTERNAL MEDICINE

## 2022-05-11 PROCEDURE — G8510 SCR DEP NEG, NO PLAN REQD: HCPCS | Performed by: INTERNAL MEDICINE

## 2022-05-11 NOTE — PATIENT INSTRUCTIONS
Cataract Surgery: What to Expect at [address] [place] [address] Drive had cataract surgery. It replaced your cloudy natural lens with a clear artificial one. After surgery, your eye may feel scratchy, sticky, or uncomfortable. It may also water more than usual.  Most people see better 1 to 3 days after surgery. But it could take 3 to 10 weeks to get the full benefits of surgery and to see as clearly as possible. Your doctor may send you home with a bandage, patch, or clear shield on your eye. This will keep you from rubbing your eye. Your doctor will also give you eyedrops to help your eye heal. Use them exactly as directed. You can read or watch TV right away, but things may look blurry. Most people are able to return to work or their normal routine in 1 to 3 days. After your eye heals, you may still need to wear glasses, especially for reading. This care sheet gives you a general idea about how long it will take for you to recover. But each person recovers at a different pace. Follow the steps below to get better as quickly as possible. How can you care for yourself at home? Activity    · Rest when you feel tired. Getting enough sleep will help you recover.     · You may have trouble judging distances for a few days. Move slowly, and be careful going up and down stairs and pouring hot liquids. Ask for help if you need it.     · Ask your doctor when it is okay to drive.     · Wear your eye bandage, patch, or shield for as long as your doctor recommends. You may only need to wear it when you sleep.     · You can shower or wash your hair the day after surgery. Keep water, soap, shampoo, hair spray, and shaving lotion out of your eye, especially for the first week.     · Do not rub or put pressure on your eye for at least 1 week.     · Do not wear eye makeup for 1 to 2 weeks.  You may also want to avoid face cream or lotion.     · Do not get your hair colored or permed for 10 days after surgery.     · Do not bend over or do any strenuous activities, such as biking, jogging, weight lifting, or aerobic exercise, for 2 weeks or until your doctor says it is okay.     · Avoid swimming, hot tubs, gardening, and dusting for 1 to 2 weeks.     · Wear sunglasses on bright days for at least 1 year after surgery. Medicines    · Your doctor will tell you if and when you can restart your medicines. You will also be given instructions about taking any new medicines.     · If you take aspirin or some other blood thinner, ask your doctor if and when to start taking it again. Make sure that you understand exactly what your doctor wants you to do.     · Follow your doctor's instructions for when to use your eyedrops. Always wash your hands before you put your drops in. To put in eyedrops:  ? Tilt your head back, and pull your lower eyelid down with one finger. ? Drop or squirt the medicine inside the lower lid. ? Close your eye for 30 to 60 seconds to let the drops or ointment move around. ? Do not touch the ointment or dropper tip to your eyelashes or any other surface.     · Follow your doctor's instructions for taking pain medicines. Follow-up care is a key part of your treatment and safety. Be sure to make and go to all appointments, and call your doctor if you are having problems. It's also a good idea to know your test results and keep a list of the medicines you take. When should you call for help? Call 911 anytime you think you may need emergency care. For example, call if:    · You passed out (lost consciousness).     · You have a sudden loss of vision.     · You have sudden chest pain, are short of breath, or cough up blood. Call your doctor now or seek immediate medical care if:    · You have signs of an eye infection, such as:  ? Pus or thick discharge coming from the eye.  ? Redness or swelling around the eye.  ?  A fever.     · You have new or worse eye pain.     · You have vision changes.     · You have symptoms of a blood clot in your leg (called a deep vein thrombosis), such as:  ? Pain in the calf, back of the knee, thigh, or groin. ? Redness and swelling in your leg or groin. Watch closely for changes in your health, and be sure to contact your doctor if:    · You do not get better as expected. Where can you learn more? Go to http://www.gray.com/  Enter R255 in the search box to learn more about \"Cataract Surgery: What to Expect at Home. \"  Current as of: January 24, 2022               Content Version: 13.2  © 4517-8403 [employer]. Care instructions adapted under license by [employer] (which disclaims liability or warranty for this information). If you have questions about a medical condition or this instruction, always ask your healthcare professional. [address] any warranty or liability for your use of this information. Cataract Surgery: Before Your Surgery  What is cataract surgery? Cataracts are cloudy areas in the lens of your eye. Your lens is behind the colored part of your eye (iris). Its job is to focus light onto the back of your eye. In some people, cataracts prevent light from reaching the back of the eye. This can cause vision problems. Cataract surgery helps you see better. It replaces your natural lens, which has become cloudy, with a clear artificial one. There are several types of cataract surgery. They include:  · Phacoemulsification. This is the most common type. The doctor makes a small cut (incision) in your eye. A special ultrasound tool is used to break your cloudy lens apart. Then the small pieces of the lens are removed and replaced with an artificial lens. Most people do not need stitches, because the incision is so small. · Extracapsular extraction. This uses a larger incision to remove the lens in one piece. It is replaced with an artificial lens. And the cut is stitched.   · Femtosecond laser-assisted cataract surgery (FLACS). This uses laser technology and replaces the natural lens with an artificial lens. Before surgery, you may be given medicine to help you relax. Medicine will be used to numb your eye. The surgery takes about 20 to 40 minutes. After surgery, you may have a bandage or shield on your eye. You will probably go home from surgery after 1 hour in the recovery room. You may be able to go back to work or your normal routine in a few days. Most people see better in 1 to 3 days. It could take 3 to 10 weeks for your eye to completely heal. After your eye heals, you may still need to wear glasses, especially for reading. How do you prepare for surgery? Surgery can be stressful. This information will help you understand what you can expect. And it will help you safely prepare for surgery. Preparing for surgery    · Be sure you have someone to take you home. Anesthesia and pain medicine will make it unsafe for you to drive or get home on your own.     · Understand exactly what surgery is planned, along with the risks, benefits, and other options.     · If you take aspirin or some other blood thinner, ask your doctor if you should stop taking it before your surgery. Make sure that you understand exactly what your doctor wants you to do. These medicines increase the risk of bleeding.     · Tell your doctor ALL the medicines, vitamins, supplements, and herbal remedies you take. Some may increase the risk of problems during your surgery. Your doctor will tell you if you should stop taking any of them before the surgery and how soon to do it.     · Make sure your doctor and the hospital have a copy of your advance directive. If you don't have one, you may want to prepare one. It lets others know your health care wishes. It's a good thing to have before any type of surgery or procedure. What happens on the day of surgery? · Follow the instructions exactly about when to stop eating and drinking.  If you don't, your surgery may be canceled. If your doctor told you to take your medicines on the day of surgery, take them with only a sip of water.     · Take a bath or shower before you come in for your surgery. Do not apply lotions, perfumes, deodorants, or nail polish.     · Take off all jewelry and piercings. At the hospital or surgery center   · Bring a picture ID.     · The area for surgery is often marked to make sure there are no errors.     · You will be kept comfortable and safe by your anesthesia provider. The anesthesia may make you sleep. Or it may just numb the area being worked on.     · The surgery will take about 20 to 40 minutes. When should you call your doctor? · You have questions or concerns.     · You don't understand how to prepare for your surgery.     · You become ill before the surgery (such as fever, flu, or a cold).     · You need to reschedule or have changed your mind about having the surgery. Where can you learn more? Go to http://www.gray.com/  Enter K474 in the search box to learn more about \"Cataract Surgery: Before Your Surgery. \"  Current as of: January 24, 2022               Content Version: 13.2  © 2006-2022 Healthwise, Incorporated. Care instructions adapted under license by [employer] (which disclaims liability or warranty for this information). If you have questions about a medical condition or this instruction, always ask your healthcare professional. [place] [address] any warranty or liability for your use of this information.

## 2022-05-11 NOTE — PROGRESS NOTES
Identified pt with two pt identifiers(name and ). Chief Complaint   Patient presents with    Pre-op Exam     Cataract RT  LT 22        Health Maintenance Due   Topic    COVID-19 Vaccine (3 - Booster for Moderna series)       Wt Readings from Last 3 Encounters:   22 153 lb (69.4 kg)   22 152 lb (68.9 kg)   21 153 lb 14.1 oz (69.8 kg)     Temp Readings from Last 3 Encounters:   22 98.1 °F (36.7 °C) (Temporal)   22 97.1 °F (36.2 °C) (Temporal)   21 97.7 °F (36.5 °C)     BP Readings from Last 3 Encounters:   22 132/82   22 136/72   21 (!) 147/69     Pulse Readings from Last 3 Encounters:   22 86   22 64   21 68         Learning Assessment:  :     Learning Assessment 10/27/2015 2015   PRIMARY LEARNER Patient Patient   HIGHEST LEVEL OF EDUCATION - PRIMARY LEARNER  GRADUATED HIGH SCHOOL OR GED -   BARRIERS PRIMARY LEARNER NONE -   Winnie Garcia CAREGIVER No -   PRIMARY LANGUAGE ENGLISH ENGLISH   LEARNER PREFERENCE PRIMARY OTHER (COMMENT) LISTENING   ANSWERED BY self patient   RELATIONSHIP SELF SELF       Depression Screening:  :     3 most recent PHQ Screens 2022   Little interest or pleasure in doing things Not at all   Feeling down, depressed, irritable, or hopeless Not at all   Total Score PHQ 2 0       Fall Risk Assessment:  :     Fall Risk Assessment, last 12 mths 2022   Able to walk? Yes   Fall in past 12 months? 0   Do you feel unsteady? 0   Are you worried about falling 0       Abuse Screening:  :     Abuse Screening Questionnaire 2022 2022 2021 1/10/2020 7/10/2018 12/15/2017 10/27/2015   Do you ever feel afraid of your partner? N N N N N N N   Are you in a relationship with someone who physically or mentally threatens you? N N N N N N N   Is it safe for you to go home?  Y Y Y Y Y Y Y       Coordination of Care Questionnaire:  :     1) Have you been to an emergency room, urgent care clinic since your last visit? no   Hospitalized since your last visit? no             2) Have you seen or consulted any other health care providers outside of [address] [place] [address] since your last visit? no  (Include any pap smears or colon screenings in this section.)    3) Do you have an Advance Directive on file? yes  Are you interested in receiving information about Advance Directives? no    Patient is accompanied by wife I have received verbal consent from Veronica Finn to discuss any/all medical information while they are present in the room. 4.  For patients aged 39-70: Has the patient had a colonoscopy / FIT/ Cologuard? Yes - no Care Gap present      If the patient is female:    5. For patients aged 41-77: Has the patient had a mammogram within the past 2 years? NA - based on age or sex      10. For patients aged 21-65: Has the patient had a pap smear?  NA - based on age or sex

## 2022-05-11 NOTE — PROGRESS NOTES
Preoperative Evaluation For Cataract Surgery    Chief Complaint   Patient presents with    Pre-op Exam     Cataract RT  LT 22       Date of Exam: 2022    Yvonne Olivier is a 66 y.o. male (:1950) who presents for preoperative evaluation. We have be asked by Dr. Mery Gomez to complete this preoperative evaluation for cataract surgery. He is scheduled to have removal of cataract and implantation of intraocular lens. First surgery on right eye is 2022 and the second surgery on the left eye is 2022. Latex Allergy: no    Problem List:     Patient Active Problem List    Diagnosis Date Noted    Dyspepsia 10/27/2015    PAF (paroxysmal atrial fibrillation) ([facility] [address] 2015    Dyslipidemia 2011    Coronary artery disease 2011     Medical History:     Past Medical History:   Diagnosis Date    Atrial fibrillation ([facility] [address] 2015    Had brief Afib  that resolved on its own withrout obvious recurrence     Back problem     CAD (coronary artery disease)     MI in  (RCA);  PCI to LAD      Dyslipidemia 2011    Embedded metal fragments     chest    Enlarged prostate     MI (myocardial infarction) ([facility] [address]     MI in        Allergies:   No Known Allergies     Medications:     Current Outpatient Medications   Medication Sig    ascorbic acid, vitamin C, (Vitamin C) 500 mg tablet Take 1,000 mg by mouth daily.  ibuprofen (MOTRIN) 800 mg tablet TAKE 1 TABLET BY MOUTH EVERY 6 TO 8 HOURS AS NEEDED    albuterol (PROVENTIL HFA, VENTOLIN HFA, PROAIR HFA) 90 mcg/actuation inhaler Take 2 Puffs by inhalation every four (4) hours as needed for Wheezing or Shortness of Breath.  cyclobenzaprine (FLEXERIL) 5 mg tablet TAKE 1 (ONE) TABLET BY MOUTH ONCE DAILY AS NEEDED FOR MUSCLE SPASM    rosuvastatin (CRESTOR) 5 mg tablet rosuvastatin 5 mg tablet    aspirin delayed-release 81 mg tablet Take  by mouth daily.  MEN'S MULTI-VITAMIN PO Take  by mouth.     cannabidiol, CBD, extract 100 mg/mL soln Take  by mouth two (2) times a day.  ubidecarenone/vitamin E mixed (COQ10  PO) Take  by mouth.  silodosin (RAPAFLO) 8 mg capsule Take 8 mg by mouth every other day.  zinc 50 mg tab tablet Take 50 mg by mouth daily. (Patient not taking: Reported on 5/11/2022)    doxycycline (VIBRAMYCIN) 100 mg capsule TAKE 1 CAPSULE BY MOUTH TWICE DAILY FOR 10 DAYS (Patient not taking: Reported on 2/7/2022)    tadalafiL (CIALIS) 5 mg tablet Take 5 mg by mouth daily as needed. (Patient not taking: Reported on 5/11/2022)    omeprazole (PriLOSEC OTC) 20 mg tablet Take 20 mg by mouth daily. (Patient not taking: Reported on 2/7/2022)       Surgical History:     Past Surgical History:   Procedure Laterality Date    COLONOSCOPY N/A 2/23/2021    COLONOSCOPY performed by Efraín Gant MD at [facility] HX COLONOSCOPY  approx 1991    HX CORONARY STENT PLACEMENT      HX HEART CATHETERIZATION  2011    1 stents placed, 1 stent 2014-total 2 stents    HX ORTHOPAEDIC      STRESS TEST CARDIOLITE       Social History:     Social History     Socioeconomic History    Marital status: LEGALLY    Tobacco Use    Smoking status: Never Smoker    Smokeless tobacco: Never Used   Vaping Use    Vaping Use: Never used   Substance and Sexual Activity    Alcohol use: No    Drug use: No    Sexual activity: Yes     Partners: Female       Anesthesia Complications: None  History of abnormal bleeding : None  History of Blood Transfusions: no  Health Care Directive or Living Will: yes    Objective:     ROS:    Feeling well. No dyspnea or chest pain on exertion. No abdominal pain, change in bowel habits, black or bloody stools. No urinary tract or prostatic symptoms. No neurological complaints. OBJECTIVE:   The patient appears well, alert, oriented x 3, in no distress.    Visit Vitals  /82 (BP 1 Location: Right arm, BP Patient Position: Sitting, BP Cuff Size: Adult)   Pulse 86   Temp 98.1 °F (36.7 °C) (Temporal)   Resp 20   Ht 5' 10\" (1.778 m)   Wt 153 lb (69.4 kg)   SpO2 96%   BMI 21.95 kg/m²     ENT normal.  Neck supple. No adenopathy or thyromegaly. CHRSI. Lungs are clear, good air entry, no wheezes, rhonchi or rales. Cardiovascular:S1 and S2 normal, no murmurs, regular rate and rhythm. Abdomen is soft without tenderness, guarding, mass or organomegaly. Extremities show no edema, normal peripheral pulses. Neurological is normal without focal findings. DIAGNOSTICS:   1. EKG: Not required  2. CXR: Not required  3. Labs: Not required    IMPRESSION:   Diagnoses and all orders for this visit:    1. Age-related cataract of both eyes, unspecified age-related cataract type    2. Pre-op evaluation    Low risk for planned surgery  No contraindications to planned surgery    Per ACC/AHA guidelines, the patient is a low risk for surgery and may proceed to planned surgery with the above noted risks. These risk and pre-operative risk factors were discussed with the patient. Needed labs and studies were reviewed and found to be in acceptable range to proceed with planned procedure.       Haven Pallas, MD   5/11/2022

## 2023-12-07 RX ORDER — ALBUTEROL SULFATE 90 UG/1
2 AEROSOL, METERED RESPIRATORY (INHALATION) EVERY 4 HOURS PRN
Qty: 8.5 G | Refills: 5 | Status: SHIPPED | OUTPATIENT
Start: 2023-12-07

## 2023-12-28 ENCOUNTER — OFFICE VISIT (OUTPATIENT)
Age: 73
End: 2023-12-28
Payer: OTHER GOVERNMENT

## 2023-12-28 VITALS
WEIGHT: 151 LBS | BODY MASS INDEX: 21.62 KG/M2 | HEIGHT: 70 IN | SYSTOLIC BLOOD PRESSURE: 138 MMHG | DIASTOLIC BLOOD PRESSURE: 64 MMHG | TEMPERATURE: 98.4 F | OXYGEN SATURATION: 94 % | HEART RATE: 75 BPM

## 2023-12-28 DIAGNOSIS — E78.2 MIXED HYPERLIPIDEMIA: ICD-10-CM

## 2023-12-28 DIAGNOSIS — F34.1 DYSTHYMIA: ICD-10-CM

## 2023-12-28 DIAGNOSIS — E55.9 VITAMIN D DEFICIENCY: ICD-10-CM

## 2023-12-28 DIAGNOSIS — K14.8 MUCOCELE OF TONGUE: ICD-10-CM

## 2023-12-28 DIAGNOSIS — I48.0 PAROXYSMAL ATRIAL FIBRILLATION (HCC): ICD-10-CM

## 2023-12-28 DIAGNOSIS — R63.0 POOR APPETITE: ICD-10-CM

## 2023-12-28 DIAGNOSIS — Z00.00 MEDICARE ANNUAL WELLNESS VISIT, SUBSEQUENT: Primary | ICD-10-CM

## 2023-12-28 DIAGNOSIS — M62.838 MUSCLE SPASM: ICD-10-CM

## 2023-12-28 PROCEDURE — G0439 PPPS, SUBSEQ VISIT: HCPCS | Performed by: INTERNAL MEDICINE

## 2023-12-28 PROCEDURE — 1123F ACP DISCUSS/DSCN MKR DOCD: CPT | Performed by: INTERNAL MEDICINE

## 2023-12-28 PROCEDURE — 99214 OFFICE O/P EST MOD 30 MIN: CPT | Performed by: INTERNAL MEDICINE

## 2023-12-28 PROCEDURE — G8420 CALC BMI NORM PARAMETERS: HCPCS | Performed by: INTERNAL MEDICINE

## 2023-12-28 PROCEDURE — G8427 DOCREV CUR MEDS BY ELIG CLIN: HCPCS | Performed by: INTERNAL MEDICINE

## 2023-12-28 PROCEDURE — 3017F COLORECTAL CA SCREEN DOC REV: CPT | Performed by: INTERNAL MEDICINE

## 2023-12-28 PROCEDURE — G8484 FLU IMMUNIZE NO ADMIN: HCPCS | Performed by: INTERNAL MEDICINE

## 2023-12-28 PROCEDURE — 1036F TOBACCO NON-USER: CPT | Performed by: INTERNAL MEDICINE

## 2023-12-28 RX ORDER — MIRTAZAPINE 7.5 MG/1
7.5 TABLET, FILM COATED ORAL NIGHTLY
Qty: 90 TABLET | Refills: 1 | Status: SHIPPED | OUTPATIENT
Start: 2023-12-28

## 2023-12-28 RX ORDER — TIZANIDINE 2 MG/1
TABLET ORAL
Qty: 30 TABLET | Refills: 5 | Status: SHIPPED | OUTPATIENT
Start: 2023-12-28

## 2023-12-28 RX ORDER — IBUPROFEN 600 MG/1
TABLET ORAL
COMMUNITY
Start: 2023-11-28

## 2023-12-28 RX ORDER — ONDANSETRON 4 MG/1
TABLET, ORALLY DISINTEGRATING ORAL
COMMUNITY
Start: 2023-12-13

## 2023-12-28 RX ORDER — DILTIAZEM HYDROCHLORIDE 120 MG/1
120 CAPSULE, EXTENDED RELEASE ORAL DAILY
COMMUNITY
Start: 2023-10-30

## 2023-12-28 NOTE — ASSESSMENT & PLAN NOTE
Monitored by specialist- no acute findings meriting change in the plan  He is being followed by Dr. Varinder Mcneil at [address] [facility]). Currently not on anticoagulation - only ASA. EP has seen and will follow outpatient with Holter Mnitor. No AC or rate control meds for now.

## 2024-01-02 ENCOUNTER — NURSE ONLY (OUTPATIENT)
Age: 74
End: 2024-01-02

## 2024-01-02 DIAGNOSIS — E78.2 MIXED HYPERLIPIDEMIA: ICD-10-CM

## 2024-01-02 DIAGNOSIS — E55.9 VITAMIN D DEFICIENCY: ICD-10-CM

## 2024-01-02 LAB
ALBUMIN SERPL-MCNC: 3.7 G/DL (ref 3.5–5)
ALBUMIN/GLOB SERPL: 1.2 (ref 1.1–2.2)
ALP SERPL-CCNC: 76 U/L (ref 45–117)
ALT SERPL-CCNC: 22 U/L (ref 12–78)
ANION GAP SERPL CALC-SCNC: 4 MMOL/L (ref 5–15)
AST SERPL-CCNC: 12 U/L (ref 15–37)
BASOPHILS # BLD: 0 K/UL (ref 0–0.1)
BASOPHILS NFR BLD: 0 % (ref 0–1)
BILIRUB SERPL-MCNC: 0.5 MG/DL (ref 0.2–1)
BUN SERPL-MCNC: 13 MG/DL (ref 6–20)
BUN/CREAT SERPL: 13 (ref 12–20)
CALCIUM SERPL-MCNC: 8.4 MG/DL (ref 8.5–10.1)
CHLORIDE SERPL-SCNC: 107 MMOL/L (ref 97–108)
CHOLEST SERPL-MCNC: 155 MG/DL
CO2 SERPL-SCNC: 31 MMOL/L (ref 21–32)
CREAT SERPL-MCNC: 0.99 MG/DL (ref 0.7–1.3)
DIFFERENTIAL METHOD BLD: ABNORMAL
EOSINOPHIL # BLD: 0 K/UL (ref 0–0.4)
EOSINOPHIL NFR BLD: 0 % (ref 0–7)
ERYTHROCYTE [DISTWIDTH] IN BLOOD BY AUTOMATED COUNT: 11.9 % (ref 11.5–14.5)
GLOBULIN SER CALC-MCNC: 3 G/DL (ref 2–4)
GLUCOSE SERPL-MCNC: 122 MG/DL (ref 65–100)
HCT VFR BLD AUTO: 43.2 % (ref 36.6–50.3)
HDLC SERPL-MCNC: 61 MG/DL
HDLC SERPL: 2.5 (ref 0–5)
HGB BLD-MCNC: 14 G/DL (ref 12.1–17)
IMM GRANULOCYTES # BLD AUTO: 0 K/UL (ref 0–0.04)
IMM GRANULOCYTES NFR BLD AUTO: 0 % (ref 0–0.5)
LDLC SERPL CALC-MCNC: 84 MG/DL (ref 0–100)
LYMPHOCYTES # BLD: 0.5 K/UL (ref 0.8–3.5)
LYMPHOCYTES NFR BLD: 7 % (ref 12–49)
MCH RBC QN AUTO: 31.5 PG (ref 26–34)
MCHC RBC AUTO-ENTMCNC: 32.4 G/DL (ref 30–36.5)
MCV RBC AUTO: 97.1 FL (ref 80–99)
MONOCYTES # BLD: 0.5 K/UL (ref 0–1)
MONOCYTES NFR BLD: 7 % (ref 5–13)
NEUTS SEG # BLD: 5.8 K/UL (ref 1.8–8)
NEUTS SEG NFR BLD: 86 % (ref 32–75)
NRBC # BLD: 0 K/UL (ref 0–0.01)
NRBC BLD-RTO: 0 PER 100 WBC
PLATELET # BLD AUTO: 274 K/UL (ref 150–400)
PMV BLD AUTO: 9.1 FL (ref 8.9–12.9)
POTASSIUM SERPL-SCNC: 4.3 MMOL/L (ref 3.5–5.1)
PROT SERPL-MCNC: 6.7 G/DL (ref 6.4–8.2)
RBC # BLD AUTO: 4.45 M/UL (ref 4.1–5.7)
RBC MORPH BLD: ABNORMAL
SODIUM SERPL-SCNC: 142 MMOL/L (ref 136–145)
TRIGL SERPL-MCNC: 50 MG/DL
VLDLC SERPL CALC-MCNC: 10 MG/DL
WBC # BLD AUTO: 6.8 K/UL (ref 4.1–11.1)

## 2024-01-03 LAB — 25(OH)D3 SERPL-MCNC: 46.3 NG/ML (ref 30–100)

## 2024-01-05 ENCOUNTER — TELEPHONE (OUTPATIENT)
Age: 74
End: 2024-01-05

## 2024-01-05 NOTE — TELEPHONE ENCOUNTER
Labs reviewed with [facility] and they verbalized understanding   He had the appt and after they left he came down with a bad head cold. So that would make sense.

## 2024-01-05 NOTE — TELEPHONE ENCOUNTER
----- Message from Cindy Simon MD sent at 1/3/2024 12:52 PM EST -----  Please let patient know that in general, his labs were stable.   However, he did have what we call a left shift on his blood cell count. Sometimes, this indicates an infection. Has he had a recent viral or other infection? Any fever or night sweats?  If so, then this is the reason. If not, would like to recheck in 2-weeks. Let me know.   Thanks!

## 2024-01-08 DIAGNOSIS — D72.89 LEFT SHIFT WITHOUT DIAGNOSIS OF SPECIFIC INFECTION: Primary | ICD-10-CM

## 2024-01-10 ENCOUNTER — TELEPHONE (OUTPATIENT)
Age: 74
End: 2024-01-10

## 2024-01-10 NOTE — TELEPHONE ENCOUNTER
Pt went to [facility] ER 01/02. Sinus, drainage and post nasal drip. [facility] only gave him Asprin, nasal spray and a medication for cough. Tested negative for strep, flu and Covid. No high grade fever. They are requesting an antibiotic be sent to [facility] in [place].     Please call Bhumi at [phone] once medication has been sent.    They are not interested in an appointment, because he was seen less than 2 weeks ago, and last week at the ER.

## 2024-01-10 NOTE — TELEPHONE ENCOUNTER
Please request record from [facility].    He can be added as virtual on Friday 11:30 AM

## 2024-01-12 ENCOUNTER — TELEMEDICINE (OUTPATIENT)
Age: 74
End: 2024-01-12
Payer: COMMERCIAL

## 2024-01-12 DIAGNOSIS — J40 BRONCHITIS: ICD-10-CM

## 2024-01-12 DIAGNOSIS — I48.0 PAROXYSMAL ATRIAL FIBRILLATION (HCC): ICD-10-CM

## 2024-01-12 DIAGNOSIS — B96.89 ACUTE BACTERIAL SINUSITIS: Primary | ICD-10-CM

## 2024-01-12 DIAGNOSIS — J01.90 ACUTE BACTERIAL SINUSITIS: Primary | ICD-10-CM

## 2024-01-12 PROCEDURE — 99441 PR PHYS/QHP TELEPHONE EVALUATION 5-10 MIN: CPT | Performed by: FAMILY MEDICINE

## 2024-01-12 RX ORDER — AMOXICILLIN AND CLAVULANATE POTASSIUM 875; 125 MG/1; MG/1
1 TABLET, FILM COATED ORAL 2 TIMES DAILY
Qty: 20 TABLET | Refills: 0 | Status: SHIPPED | OUTPATIENT
Start: 2024-01-12 | End: 2024-01-22

## 2024-01-12 SDOH — ECONOMIC STABILITY: INCOME INSECURITY: HOW HARD IS IT FOR YOU TO PAY FOR THE VERY BASICS LIKE FOOD, HOUSING, MEDICAL CARE, AND HEATING?: NOT HARD AT ALL

## 2024-01-12 SDOH — ECONOMIC STABILITY: FOOD INSECURITY: WITHIN THE PAST 12 MONTHS, YOU WORRIED THAT YOUR FOOD WOULD RUN OUT BEFORE YOU GOT MONEY TO BUY MORE.: NEVER TRUE

## 2024-01-12 SDOH — ECONOMIC STABILITY: HOUSING INSECURITY
IN THE LAST 12 MONTHS, WAS THERE A TIME WHEN YOU DID NOT HAVE A STEADY PLACE TO SLEEP OR SLEPT IN A SHELTER (INCLUDING NOW)?: NO

## 2024-01-12 SDOH — ECONOMIC STABILITY: FOOD INSECURITY: WITHIN THE PAST 12 MONTHS, THE FOOD YOU BOUGHT JUST DIDN'T LAST AND YOU DIDN'T HAVE MONEY TO GET MORE.: NEVER TRUE

## 2024-01-12 ASSESSMENT — PATIENT HEALTH QUESTIONNAIRE - PHQ9
SUM OF ALL RESPONSES TO PHQ9 QUESTIONS 1 & 2: 0
10. IF YOU CHECKED OFF ANY PROBLEMS, HOW DIFFICULT HAVE THESE PROBLEMS MADE IT FOR YOU TO DO YOUR WORK, TAKE CARE OF THINGS AT HOME, OR GET ALONG WITH OTHER PEOPLE: 0
5. POOR APPETITE OR OVEREATING: 0
7. TROUBLE CONCENTRATING ON THINGS, SUCH AS READING THE NEWSPAPER OR WATCHING TELEVISION: 0
SUM OF ALL RESPONSES TO PHQ QUESTIONS 1-9: 0
SUM OF ALL RESPONSES TO PHQ QUESTIONS 1-9: 0
2. FEELING DOWN, DEPRESSED OR HOPELESS: 0
9. THOUGHTS THAT YOU WOULD BE BETTER OFF DEAD, OR OF HURTING YOURSELF: 0
4. FEELING TIRED OR HAVING LITTLE ENERGY: 0
6. FEELING BAD ABOUT YOURSELF - OR THAT YOU ARE A FAILURE OR HAVE LET YOURSELF OR YOUR FAMILY DOWN: 0
8. MOVING OR SPEAKING SO SLOWLY THAT OTHER PEOPLE COULD HAVE NOTICED. OR THE OPPOSITE, BEING SO FIGETY OR RESTLESS THAT YOU HAVE BEEN MOVING AROUND A LOT MORE THAN USUAL: 0
3. TROUBLE FALLING OR STAYING ASLEEP: 0
SUM OF ALL RESPONSES TO PHQ QUESTIONS 1-9: 0
SUM OF ALL RESPONSES TO PHQ QUESTIONS 1-9: 0
1. LITTLE INTEREST OR PLEASURE IN DOING THINGS: 0

## 2024-01-12 NOTE — PROGRESS NOTES
Identified pt with two pt identifiers(name and ).    Chief Complaint   Patient presents with    Sinus Problem     Sinus congestion that started   Headaches and eye pain  Sore throat  Coughing up mucus that yellow        Health Maintenance Due   Topic    Respiratory Syncytial Virus (RSV) Pregnant or age 60 yrs+ (1 - 1-dose 60+ series)    Flu vaccine (1)    COVID-19 Vaccine (3 - 2023-24 season)       Wt Readings from Last 3 Encounters:   23 68.5 kg (151 lb)   22 69.4 kg (153 lb)   22 68.9 kg (152 lb)     Temp Readings from Last 3 Encounters:   23 98.4 °F (36.9 °C) (Temporal)     BP Readings from Last 3 Encounters:   23 138/64   22 132/82   22 136/72     Pulse Readings from Last 3 Encounters:   23 75   22 86   22 64           Depression Screening:  :         2024     8:21 AM 2023     1:30 PM 2022    10:00 AM 2022    10:36 AM   PHQ-9 Questionaire   Little interest or pleasure in doing things 0 0 0 0   Feeling down, depressed, or hopeless 0 0 0 0   Trouble falling or staying asleep, or sleeping too much 0      Feeling tired or having little energy 0      Poor appetite or overeating 0      Feeling bad about yourself - or that you are a failure or have let yourself or your family down 0      Trouble concentrating on things, such as reading the newspaper or watching television 0      Moving or speaking so slowly that other people could have noticed. Or the opposite - being so fidgety or restless that you have been moving around a lot more than usual 0      Thoughts that you would be better off dead, or of hurting yourself in some way 0      PHQ-9 Total Score 0 0 0 0   If you checked off any problems, how difficult have these problems made it for you to do your work, take care of things at home, or get along with other people? 0           Fall Risk Assessment:  :         2023     1:30 PM   Fall Risk   2 or more falls in past

## 2024-01-12 NOTE — PROGRESS NOTES
Documentation:  I communicated with the patient and/or health care decision maker about sinus congestion and cough.   Details of this discussion including any medical advice provided: pt has been sick almost 2 weeks.  Has been to [facility]. Tested negative COVID, strep, FLU.  Prod cough, thick yellow phlegm. No fever. No SOB or wheezes. Taking Tussin.    Total Time: minutes: 5-10 minutes    Segundo Ramirez was evaluated through a synchronous (real-time) audio encounter. Patient identification was verified at the start of the visit. He (or guardian if applicable) is aware that this is a billable service, which includes applicable co-pays. This visit was conducted with the patient's (and/or legal guardian's) verbal consent. He has not had a related appointment within my department in the past 7 days or scheduled within the next 24 hours.   The patient was located at Home: [address] [place] [id number].  The provider was located at Facility (Appt Dept): [address] [place] [address].    Note: not billable if this call serves to triage the patient into an appointment for the relevant concern    Segundo Ramirez is a 73 y.o. male evaluated via telephone on 1/12/2024 for Sinus Problem (Sinus congestion that started January 1st/Headaches and eye pain/Sore throat/Coughing up mucus that yellow)  .    Segundo was seen today for sinus problem.    Diagnoses and all orders for this visit:    Acute bacterial sinusitis  -     amoxicillin-clavulanate (AUGMENTIN) 875-125 MG per tablet; Take 1 tablet by mouth 2 times daily for 10 days    Paroxysmal atrial fibrillation (HCC)    Bronchitis  -     amoxicillin-clavulanate (AUGMENTIN) 875-125 MG per tablet; Take 1 tablet by mouth 2 times daily for 10 days      FU JEANINEN    Kiarra Zaman MD

## 2024-01-30 ENCOUNTER — OFFICE VISIT (OUTPATIENT)
Age: 74
End: 2024-01-30

## 2024-01-30 VITALS
BODY MASS INDEX: 21.62 KG/M2 | OXYGEN SATURATION: 98 % | RESPIRATION RATE: 18 BRPM | WEIGHT: 151 LBS | HEART RATE: 75 BPM | DIASTOLIC BLOOD PRESSURE: 78 MMHG | SYSTOLIC BLOOD PRESSURE: 120 MMHG | HEIGHT: 70 IN

## 2024-01-30 DIAGNOSIS — D10.1 FIBROMA OF TONGUE: Primary | ICD-10-CM

## 2024-01-30 ASSESSMENT — ENCOUNTER SYMPTOMS
CHOKING: 0
VOMITING: 0
TROUBLE SWALLOWING: 0
STRIDOR: 0
APNEA: 0
SINUS PRESSURE: 0
ABDOMINAL PAIN: 0
EYE DISCHARGE: 0
SHORTNESS OF BREATH: 0
SINUS PAIN: 0
SORE THROAT: 0
BACK PAIN: 0
VOICE CHANGE: 0
RHINORRHEA: 0
COUGH: 0
EYE ITCHING: 0
NAUSEA: 0

## 2024-01-30 NOTE — PROGRESS NOTES
Subjective:    Segundo Ramirez   73 y.o.   1950     New Patient Visit    Chief Complaint   Patient presents with    New Patient     Mucocele of tongue       History of Present Illness: 72yo male with PMH CAD, HLD, pAfib, BPH who presents for tongue cyst. Patient reports small cyst has been present at the L tip of his tongue for years. Usually does not bother him but sometimes will catch in a gap in his bottom teeth. In the past he has popped it/stuck a needle in it and has noticed clear fluid. It will always recur in the same spot. Now he feels the lesion has become more hard and is now white. He denies history of any tobacco use.     Review of Systems  Review of Systems   Constitutional:  Negative for activity change, appetite change, chills, fatigue and fever.   HENT:  Positive for mouth sores. Negative for congestion, ear discharge, ear pain, nosebleeds, postnasal drip, rhinorrhea, sinus pressure, sinus pain, sneezing, sore throat, tinnitus, trouble swallowing and voice change.    Eyes:  Negative for discharge, itching and visual disturbance.   Respiratory:  Negative for apnea, cough, choking, shortness of breath and stridor.    Cardiovascular:  Negative for chest pain and palpitations.   Gastrointestinal:  Negative for abdominal pain, nausea and vomiting.   Endocrine: Negative for cold intolerance and heat intolerance.   Genitourinary:  Negative for difficulty urinating and dysuria.   Musculoskeletal:  Negative for arthralgias, back pain, gait problem, myalgias, neck pain and neck stiffness.   Skin:  Negative for rash and wound.   Allergic/Immunologic: Negative for environmental allergies and food allergies.   Neurological:  Negative for dizziness, speech difficulty, light-headedness and headaches.   Hematological:  Negative for adenopathy. Does not bruise/bleed easily.   Psychiatric/Behavioral:  Negative for confusion and sleep disturbance. The patient is not nervous/anxious.            Past Medical History:

## 2024-02-01 LAB
CPT BILLING CODE: NORMAL
DIAGNOSIS SYNOPSIS:: NORMAL
DX ICD CODE: NORMAL
DX ICD CODE: NORMAL
PATH REPORT.FINAL DX SPEC: NORMAL
PATH REPORT.GROSS SPEC: NORMAL
PATH REPORT.MICROSCOPIC SPEC OTHER STN: NORMAL
PATH REPORT.SITE OF ORIGIN SPEC: NORMAL
PATHOLOGIST NAME: NORMAL
PAYMENT PROCEDURE: NORMAL

## 2024-04-24 DIAGNOSIS — F34.1 DYSTHYMIA: ICD-10-CM

## 2024-04-24 DIAGNOSIS — R63.0 POOR APPETITE: ICD-10-CM

## 2024-04-24 RX ORDER — MIRTAZAPINE 7.5 MG/1
7.5 TABLET, FILM COATED ORAL NIGHTLY
Qty: 90 TABLET | Refills: 1 | Status: SHIPPED | OUTPATIENT
Start: 2024-04-24

## 2024-08-19 ENCOUNTER — TELEPHONE (OUTPATIENT)
Age: 74
End: 2024-08-19

## 2024-08-19 DIAGNOSIS — Z91.030 BEE STING ALLERGY: Primary | ICD-10-CM

## 2024-08-19 RX ORDER — EPINEPHRINE 0.3 MG/.3ML
0.3 INJECTION SUBCUTANEOUS ONCE
Qty: 0.3 ML | Refills: 0 | Status: SHIPPED | OUTPATIENT
Start: 2024-08-19 | End: 2024-08-22 | Stop reason: SDUPTHER

## 2024-08-19 NOTE — TELEPHONE ENCOUNTER
Patient's wife called requesting a prescription for an Epipen be sent to pharmacy. His current Epipen has  and patient is severely allergic to bees. They have been seeing so many bees around their home, so patient's wife is worried.     Bhumi [phone]     [facility] [address] [place] [address] [address] - P [phone] - F [phone] [[id number]]

## 2024-08-19 NOTE — TELEPHONE ENCOUNTER
When I spoke with patient's wife this morning, she requested Epipen be sent to [facility] [address] [place] [address] [address] - P [phone] - F [phone] [[id number]].I tried to call her to confirm it was ok to be sent to [facility]. She answered, but I had trouble hearing her. Call was disconnected.

## 2024-08-22 DIAGNOSIS — Z91.030 BEE STING ALLERGY: ICD-10-CM

## 2024-08-22 RX ORDER — EPINEPHRINE 0.3 MG/.3ML
0.3 INJECTION SUBCUTANEOUS ONCE
Qty: 0.3 ML | Refills: 0 | Status: SHIPPED | OUTPATIENT
Start: 2024-08-22 | End: 2024-08-22

## 2024-08-22 NOTE — TELEPHONE ENCOUNTER
EPINEPHrine (EPIPEN 2-TIM) 0.3 MG/0.3ML SOAJ injection [[id number]]     [facility] [address] [place] [address] [address] - P [phone] - F [phone]     Please see last telephone encounter. This needs to be sent to [facility] instead. [facility] no longer accepts patient's insurance. Wife called and patient was nearly stung by a bee yesterday. I have updated patient's preferred pharmacy in his chart.

## 2024-12-01 DIAGNOSIS — R63.0 POOR APPETITE: ICD-10-CM

## 2024-12-01 DIAGNOSIS — F34.1 DYSTHYMIA: ICD-10-CM

## 2024-12-02 NOTE — MR AVS SNAPSHOT
Forms received from: [facility]    Phone number listed: [phone]   Fax listed: [phone]  Date received: 11/29/24  Form description: Order # [id number]  Once forms are completed, please return to Home [facility] via fax.  Is patient requesting to be contacted when forms are completed: na  Phone: na  Form placed: Marisol Navarrete in basket    Visit Information Date & Time Provider Department Dept. Phone Encounter #  
 3/30/2017 10:00 AM Parish VelardeGallo [phone] [id number] Your Appointments 1/17/2018 10:20 AM  
ESTABLISHED PATIENT with Malcom Arnett MD  
[facility] ([facility]IG[facility] SCHEDULING) Appt Note: annual  
 [address] [facility] [place] [address] [facility] [address] [address] Upcoming Health Maintenance Date Due Hepatitis C Screening 1950 COLONOSCOPY 11/1/1967 Pneumococcal 65+ Low/Medium Risk (2 of 2 - PPSV23) 8/19/2016 MEDICARE YEARLY EXAM 1/12/2017 GLAUCOMA SCREENING Q2Y 7/28/2017 DTaP/Tdap/Td series (3 - Td) 8/19/2025 Allergies as of 3/30/2017  Review Complete On: 3/30/2017 By: Parish Velarde MD  
 No Known Allergies Current Immunizations  Reviewed on 3/10/2015 Name Date Influenza High Dose Vaccine PF 9/28/2015 Pneumococcal Conjugate (PCV-13) 8/19/2015 Pneumococcal Polysaccharide (PPSV-23)  Incomplete Tdap 8/19/2015, 8/22/2011 Zoster Vaccine, Live 4/22/2010 Not reviewed this visit You Were Diagnosed With   
  
 Codes Comments Medicare annual wellness visit, initial    -  Primary ICD-10-CM: Z00.00 ICD-9-CM: V70.0 Screening for alcoholism     ICD-10-CM: Z13.89 ICD-9-CM: V79.1 Screening for depression     ICD-10-CM: Z13.89 ICD-9-CM: V79.0 Need for hepatitis C screening test     ICD-10-CM: Z11.59 
ICD-9-CM: V73.89 Encounter for immunization     ICD-10-CM: X78 ICD-9-CM: V03.89 Dyslipidemia     ICD-10-CM: E78.5 ICD-9-CM: 272.4 Fatigue, unspecified type     ICD-10-CM: R53.83 ICD-9-CM: 780.79 Screening for malignant neoplasm of colon     ICD-10-CM: Z12.11 ICD-9-CM: V76.51 Vitals BP Pulse Temp Resp Height(growth percentile) Weight(growth percentile) 140/81 (BP 1 Location: Left arm, BP Patient Position: Sitting) 78 98 °F (36.7 °C) (Oral) 16 5' 10\" (1.778 m) 156 lb 9.6 oz (71 kg) SpO2 BMI Smoking Status 99% 22.47 kg/m2 Never Smoker BMI and BSA Data Body Mass Index Body Surface Area  
 22.47 kg/m 2 1.87 m 2 Preferred Pharmacy Pharmacy Name Phone [address], [address] [facility] [phone] Your Updated Medication List  
  
   
This list is accurate as of: 3/30/17 10:49 AM.  Always use your most recent med list.  
  
  
  
  
 albuterol 90 mcg/actuation inhaler Commonly known as:  PROVENTIL HFA, VENTOLIN HFA, PROAIR HFA Take 2 Puffs by inhalation every four (4) hours as needed for Wheezing or Shortness of Breath. aspirin 81 mg chewable tablet Take 1 Tab by mouth daily. atorvastatin 40 mg tablet Commonly known as:  LIPITOR Take 1 Tab by mouth daily. CO Q-10 100 mg capsule Generic drug:  co-enzyme Q-10 Take 100 mg by mouth daily. cyclobenzaprine 5 mg tablet Commonly known as:  FLEXERIL  
TAKE 1 TABLET BY MOUTH ONCE DAILY AS NEEDED FOR MUSCLE SPASMS  
  
 multivitamin tablet Commonly known as:  ONE A DAY Take 1 Tab by mouth daily. PROBIOTIC 4X 10-15 mg Tbec Generic drug:  B.infantis-B.ani-B.long-B.bifi Take  by mouth daily. silodosin 8 mg capsule Commonly known as:  RAPAFLO Take 8 mg by mouth every other day. We Performed the Following ADMIN PNEUMOCOCCAL VACCINE [ HCPCS] [facility] 68 [[id number] HCPCS] HEPATITIS C AB [82664 CPT(R)] LIPID PANEL [08608 CPT(R)] METABOLIC PANEL, COMPREHENSIVE [27407 CPT(R)] PNEUMOCOCCAL POLYSACCHARIDE VACCINE, 23-VALENT, ADULT OR IMMUNOSUPPRESSED PT DOSE, [10839 CPT(R)] REFERRAL TO GASTROENTEROLOGY [GOD53 Custom] Comments: For screening colonoscopy TESTOSTERONE, TOTAL, ADULT MALE [74371 CPT(R)] TSH RFX ON ABNORMAL TO FREE T4 [[id number] Custom] Referral Information Referral ID Referred By Referred To  
  
 [id number] [facility] [address] [place] [address],[address] Floor, [place] [address] Phone: [phone] Fax: [phone] Visits Status Start Date End Date 1 New Request 3/30/17 3/30/18 If your referral has a status of pending review or denied, additional information will be sent to support the outcome of this decision. Patient Instructions The best way to stay healthy is to live a healthy lifestyle. A healthy lifestyle includes regular exercise, eating a well-balanced diet, keeping a healthy weight and not smoking. Regular physical exams and screening tests are another important way to take care of yourself. Preventive exams provided by health care providers can find health problems early when treatment works best and can keep you from getting certain diseases or illnesses. Preventive services include exams, lab tests, screenings, shots, monitoring and information to help you take care of your own health. All people over 65 should have a pneumonia shot. Pneumonia shots are usually only needed once in a lifetime unless your doctor decides differently. In addition to your physical exam, some screening tests are recommended: 
 
All people over 65 should have a yearly flu shot. People over 65 are at medium to high risk for Hepatitis B. Three shots are needed for complete protection. Bone mass measurement (dexa scan) is recommended every two years. Diabetes Mellitus screening is recommended every year. Glaucoma is an eye disease caused by high pressure in the eye. An eye exam is recommended every year. Cardiovascular screening tests that check your cholesterol and other blood fat (lipid) levels are recommended every five years.   
 
Colorectal Cancer screening tests help to find pre-cancerous polyps (growths in the colon) so they can be removed before they turn into cancer. Tests ordered for screening depend on your personal and family history risk factors. Prostate Cancer Screening (annually up to age 76) Screening for breast cancer is recommended yearly with a Mammogram. 
 
Screening for cervical and vaginal cancer is recommended with a pelvic and Pap test every two years. However if you have had an abnormal pap in the past  three years or at high risk for cervical or vaginal cancer Medicare will cover a pap test and a pelvic exam every year. Here is a list of your current Health Maintenance items with a due date: 
Health Maintenance Due Topic Date Due  
 Hepatitis C Test  1950   Lab has been ordered  Colonoscopy  11/01/1967  Pneumococcal Vaccine (2 of 2 - PPSV23) 08/19/2016  Done today 3/30/17  Annual Well Visit  01/12/2017  Done today 3/30/17 Pneumococcal Polysaccharide Vaccine: What You Need to Know Why get vaccinated? Vaccination can protect older adults (and some children and younger adults) from pneumococcal disease. Pneumococcal disease is caused by bacteria that can spread from person to person through close contact. It can cause ear infections, and it can also lead to more serious infections of the: 
· Lungs (pneumonia), · Blood (bacteremia), and 
· Covering of the brain and spinal cord (meningitis). Meningitis can cause deafness and brain damage, and it can be fatal. 
Anyone can get pneumococcal disease, but children under 3years of age, people with certain medical conditions, adults over 72years of age, and cigarette smokers are at the highest risk. About 18,000 older adults die each year from pneumococcal disease in the [place]. Treatment of pneumococcal infections with penicillin and other drugs used to be more effective. But some strains of the disease have become resistant to these drugs.  This makes prevention of the disease, through vaccination, even more important. Pneumococcal polysaccharide vaccine (PPSV23) Pneumococcal polysaccharide vaccine (PPSV23) protects against 23 types of pneumococcal bacteria. It will not prevent all pneumococcal disease. PPSV23 is recommended for: · All adults 72years of age and older, · Anyone 2 through 59years of age with certain long-term health problems, 
· Anyone 2 through 59years of age with a weakened immune system, 
· Adults 23 through 59years of age who smoke cigarettes or have asthma. Most people need only one dose of PPSV. A second dose is recommended for certain high-risk groups. People 72 and older should get a dose even if they have gotten one or more doses of the vaccine before they turned 65. Your healthcare provider can give you more information about these recommendations. Most healthy adults develop protection within 2 to 3 weeks of getting the shot. Some people should not get this vaccine · Anyone who has had a life-threatening allergic reaction to PPSV should not get another dose. · Anyone who has a severe allergy to any component of PPSV should not receive it. Tell your provider if you have any severe allergies. · Anyone who is moderately or severely ill when the shot is scheduled may be asked to wait until they recover before getting the vaccine. Someone with a mild illness can usually be vaccinated. · Children less than 3years of age should not receive this vaccine. · There is no evidence that PPSV is harmful to either a pregnant woman or to her fetus. However, as a precaution, women who need the vaccine should be vaccinated before becoming pregnant, if possible. Risks of a vaccine reaction With any medicine, including vaccines, there is a chance of side effects. These are usually mild and go away on their own, but serious reactions are also possible.  
About half of people who get PPSV have mild side effects, such as redness or pain where the shot is given, which go away within about two days. Less than 1 out of 100 people develop a fever, muscle aches, or more severe local reactions. Problems that could happen after any vaccine: · People sometimes faint after a medical procedure, including vaccination. Sitting or lying down for about 15 minutes can help prevent fainting, and injuries caused by a fall. Tell your doctor if you feel dizzy, or have vision changes or ringing in the ears. · Some people get severe pain in the shoulder and have difficulty moving the arm where a shot was given. This happens very rarely. · Any medication can cause a severe allergic reaction. Such reactions from a vaccine are very rare, estimated at about 1 in a million doses, and would happen within a few minutes to a few hours after the vaccination. As with any medicine, there is a very remote chance of a vaccine causing a serious injury or death. The safety of vaccines is always being monitored. For more information, visit: www.cdc.gov/vaccinesafety/ What if there is a serious reaction? What should I look for? Look for anything that concerns you, such as signs of a severe allergic reaction, very high fever, or unusual behavior. Signs of a severe allergic reaction can include hives, swelling of the face and throat, difficulty breathing, a fast heartbeat, dizziness, and weakness. These would usually start a few minutes to a few hours after the vaccination. What should I do? If you think it is a severe allergic reaction or other emergency that can't wait, call 9-1-1 or get to the nearest hospital. Otherwise, call your doctor. Afterward, the reaction should be reported to the Vaccine Adverse Event Reporting System (VAERS). Your doctor might file this report, or you can do it yourself through the VAERS web site at www.vaers. hhs.gov, or by calling 3-944.832.9037. VAERS does not give medical advice. How can I learn more? · Ask your doctor. He or she can give you the vaccine package insert or suggest other sources of information. · Call your local or state health department. · Contact the Centers for Disease Control and Prevention (CDC): 
¨ Call 9-640.733.2834 (1-800-CDC-INFO) or ¨ Visit CDC's website at www.cdc.gov/vaccines Vaccine Information Statement PPSV Vaccine 
(04/24/2015) Department of Health and [place] Centers for Disease Control and Prevention Many Vaccine Information Statements are available in Japanese and other languages. See www.immunize.org/vis. Hojas de información Sobre Vacunas están disponibles en español y en muchos otros idiomas. Visite TripConnectScale.si. Care instructions adapted under license by your healthcare professional. If you have questions about a medical condition or this instruction, always ask your healthcare professional. [address] any warranty or liability for your use of this information. A Healthy Lifestyle: Care Instructions Your Care Instructions A healthy lifestyle can help you feel good, stay at a healthy weight, and have plenty of energy for both work and play. A healthy lifestyle is something you can share with your whole family. A healthy lifestyle also can lower your risk for serious health problems, such as high blood pressure, heart disease, and diabetes. You can follow a few steps listed below to improve your health and the health of your family. Follow-up care is a key part of your treatment and safety. Be sure to make and go to all appointments, and call your doctor if you are having problems. Its also a good idea to know your test results and keep a list of the medicines you take. How can you care for yourself at home? · Do not eat too much sugar, fat, or fast foods. You can still have dessert and treats now and then. The goal is moderation. · Start small to improve your eating habits.  Pay attention to portion sizes, drink less juice and soda pop, and eat more fruits and vegetables. ¨ Eat a healthy amount of food. A 3-ounce serving of meat, for example, is about the size of a deck of cards. Fill the rest of your plate with vegetables and whole grains. ¨ Limit the amount of soda and sports drinks you have every day. Drink more water when you are thirsty. ¨ Eat at least 5 servings of fruits and vegetables every day. It may seem like a lot, but it is not hard to reach this goal. A serving or helping is 1 piece of fruit, 1 cup of vegetables, or 2 cups of leafy, raw vegetables. Have an apple or some carrot sticks as an afternoon snack instead of a candy bar. Try to have fruits and/or vegetables at every meal. 
· Make exercise part of your daily routine. You may want to start with simple activities, such as walking, bicycling, or slow swimming. Try to be active 30 to 60 minutes every day. You do not need to do all 30 to 60 minutes all at once. For example, you can exercise 3 times a day for 10 or 20 minutes. Moderate exercise is safe for most people, but it is always a good idea to talk to your doctor before starting an exercise program. 
· Keep moving. Angela Bonds the lawn, work in the garden, or [employer]. Take the stairs instead of the elevator at work. · If you smoke, quit. People who smoke have an increased risk for heart attack, stroke, cancer, and other lung illnesses. Quitting is hard, but there are ways to boost your chance of quitting tobacco for good. ¨ Use nicotine gum, patches, or lozenges. ¨ Ask your doctor about stop-smoking programs and medicines. ¨ Keep trying. In addition to reducing your risk of diseases in the future, you will notice some benefits soon after you stop using tobacco. If you have shortness of breath or asthma symptoms, they will likely get better within a few weeks after you quit. · Limit how much alcohol you drink.  Moderate amounts of alcohol (up to 2 drinks a day for men, 1 drink a day for women) are okay. But drinking too much can lead to liver problems, high blood pressure, and other health problems. Family health If you have a family, there are many things you can do together to improve your health. · Eat meals together as a family as often as possible. · Eat healthy foods. This includes fruits, vegetables, lean meats and dairy, and whole grains. · Include your family in your fitness plan. Most people think of activities such as jogging or tennis as the way to fitness, but there are many ways you and your family can be more active. Anything that makes you breathe hard and gets your heart pumping is exercise. Here are some tips: 
¨ Walk to do errands or to take your child to school or the bus. ¨ Go for a family bike ride after dinner instead of watching TV. Where can you learn more? Go to http://argentina-hasmukh.info/. Enter L050 in the search box to learn more about \"A Healthy Lifestyle: Care Instructions. \" Current as of: July 26, 2016 Content Version: 11.2 © 7494-2272 iJento. Care instructions adapted under license by [employer] (which disclaims liability or warranty for this information). If you have questions about a medical condition or this instruction, always ask your healthcare professional. [address] any warranty or liability for your use of this information. Introducing [facility]! Roberto Leija introduces [employer] patient portal. Now you can access parts of your medical record, email your doctor's office, and request medication refills online. 1. In your internet browser, go to [email or web address] 2. Click on the First Time User? Click Here link in the Sign In box. You will see the New Member Sign Up page. 3. Enter your [employer] Access Code exactly as it appears below.  You will not need to use this code after youve completed the sign-up process. If you do not sign up before the expiration date, you must request a new code. · [email or web address] Access Code: Ruthanna Kehr Expires: 6/28/2017 10:17 AM 
 
4. Enter the last four digits of your Social Security Number (xxxx) and Date of Birth (mm/dd/yyyy) as indicated and click Submit. You will be taken to the next sign-up page. 5. Create a Refresh.io ID. This will be your Refresh.io login ID and cannot be changed, so think of one that is secure and easy to remember. 6. Create a Refresh[employer]io password. You can change your password at any time. 7. Enter your Password Reset Question and Answer. This can be used at a later time if you forget your password. 8. Enter your e-mail address. You will receive e-mail notification when new information is available in [address] 9. Click Sign Up. You can now view and download portions of your medical record. 10. Click the Download Summary menu link to download a portable copy of your medical information. If you have questions, please visit the Frequently Asked Questions section of the [email or web address] website. Remember, Ref[employer]io is NOT to be used for urgent needs. For medical emergencies, dial 911. Now available from your iPhone and Android! Please provide this summary of care documentation to your next provider. Your primary care clinician is listed as Katarina Mckenzie. If you have any questions after today's visit, please call [phone].

## 2024-12-03 ENCOUNTER — TELEPHONE (OUTPATIENT)
Age: 74
End: 2024-12-03

## 2024-12-03 DIAGNOSIS — R63.0 POOR APPETITE: ICD-10-CM

## 2024-12-03 DIAGNOSIS — F34.1 DYSTHYMIA: ICD-10-CM

## 2024-12-03 RX ORDER — MIRTAZAPINE 7.5 MG/1
7.5 TABLET, FILM COATED ORAL NIGHTLY
Qty: 30 TABLET | Refills: 0 | OUTPATIENT
Start: 2024-12-03

## 2024-12-03 RX ORDER — MIRTAZAPINE 7.5 MG/1
7.5 TABLET, FILM COATED ORAL NIGHTLY
Qty: 30 TABLET | Refills: 0 | Status: SHIPPED | OUTPATIENT
Start: 2024-12-03

## 2024-12-03 NOTE — TELEPHONE ENCOUNTER
Bhumi called and was confused as to why patient needed a Medication Refill appointment. She stated that patient does not have any major health issues/concerns, had his Annual on 12/28/23 and isn't due for his next annual until January.     I explained to her that per Dr Simon's clinical notes on 12/28/23, patient should return to office \"Every 4-6 months for follow up.\" She stated that is new to her and she will discuss that with Dr Simon at upcoming appointment.     She is requesting a refill because patient is completely out of medication and stated she was under the impression that as long as he has an appointment scheduled that pcp would send prescription to pharmacy.

## 2024-12-09 ENCOUNTER — OFFICE VISIT (OUTPATIENT)
Age: 74
End: 2024-12-09
Payer: MEDICARE

## 2024-12-09 VITALS
OXYGEN SATURATION: 99 % | SYSTOLIC BLOOD PRESSURE: 138 MMHG | HEART RATE: 62 BPM | WEIGHT: 155 LBS | BODY MASS INDEX: 22.19 KG/M2 | HEIGHT: 70 IN | DIASTOLIC BLOOD PRESSURE: 70 MMHG | TEMPERATURE: 97.7 F | RESPIRATION RATE: 20 BRPM

## 2024-12-09 DIAGNOSIS — Z23 ENCOUNTER FOR IMMUNIZATION: ICD-10-CM

## 2024-12-09 DIAGNOSIS — R73.09 ELEVATED GLUCOSE: ICD-10-CM

## 2024-12-09 DIAGNOSIS — R53.83 MALAISE AND FATIGUE: ICD-10-CM

## 2024-12-09 DIAGNOSIS — I48.0 PAROXYSMAL ATRIAL FIBRILLATION (HCC): ICD-10-CM

## 2024-12-09 DIAGNOSIS — F32.89 OTHER DEPRESSION: ICD-10-CM

## 2024-12-09 DIAGNOSIS — R53.81 MALAISE AND FATIGUE: ICD-10-CM

## 2024-12-09 DIAGNOSIS — Z00.00 MEDICARE ANNUAL WELLNESS VISIT, SUBSEQUENT: Primary | ICD-10-CM

## 2024-12-09 DIAGNOSIS — M62.838 MUSCLE SPASM: ICD-10-CM

## 2024-12-09 DIAGNOSIS — E55.9 VITAMIN D DEFICIENCY: ICD-10-CM

## 2024-12-09 DIAGNOSIS — E78.2 MIXED HYPERLIPIDEMIA: ICD-10-CM

## 2024-12-09 PROCEDURE — 99214 OFFICE O/P EST MOD 30 MIN: CPT | Performed by: INTERNAL MEDICINE

## 2024-12-09 PROCEDURE — 90653 IIV ADJUVANT VACCINE IM: CPT | Performed by: INTERNAL MEDICINE

## 2024-12-09 RX ORDER — MIRTAZAPINE 15 MG/1
15 TABLET, FILM COATED ORAL NIGHTLY
Qty: 90 TABLET | Refills: 1 | Status: SHIPPED | OUTPATIENT
Start: 2024-12-09

## 2024-12-09 RX ORDER — TIZANIDINE 2 MG/1
TABLET ORAL
Qty: 30 TABLET | Refills: 5 | Status: SHIPPED | OUTPATIENT
Start: 2024-12-09

## 2024-12-09 ASSESSMENT — PATIENT HEALTH QUESTIONNAIRE - PHQ9
SUM OF ALL RESPONSES TO PHQ QUESTIONS 1-9: 0
4. FEELING TIRED OR HAVING LITTLE ENERGY: NOT AT ALL
2. FEELING DOWN, DEPRESSED OR HOPELESS: NOT AT ALL
SUM OF ALL RESPONSES TO PHQ QUESTIONS 1-9: 0
3. TROUBLE FALLING OR STAYING ASLEEP: NOT AT ALL
8. MOVING OR SPEAKING SO SLOWLY THAT OTHER PEOPLE COULD HAVE NOTICED. OR THE OPPOSITE, BEING SO FIGETY OR RESTLESS THAT YOU HAVE BEEN MOVING AROUND A LOT MORE THAN USUAL: NOT AT ALL
SUM OF ALL RESPONSES TO PHQ QUESTIONS 1-9: 0
SUM OF ALL RESPONSES TO PHQ9 QUESTIONS 1 & 2: 0
6. FEELING BAD ABOUT YOURSELF - OR THAT YOU ARE A FAILURE OR HAVE LET YOURSELF OR YOUR FAMILY DOWN: NOT AT ALL
SUM OF ALL RESPONSES TO PHQ QUESTIONS 1-9: 0
9. THOUGHTS THAT YOU WOULD BE BETTER OFF DEAD, OR OF HURTING YOURSELF: NOT AT ALL
5. POOR APPETITE OR OVEREATING: NOT AT ALL
10. IF YOU CHECKED OFF ANY PROBLEMS, HOW DIFFICULT HAVE THESE PROBLEMS MADE IT FOR YOU TO DO YOUR WORK, TAKE CARE OF THINGS AT HOME, OR GET ALONG WITH OTHER PEOPLE: NOT DIFFICULT AT ALL
7. TROUBLE CONCENTRATING ON THINGS, SUCH AS READING THE NEWSPAPER OR WATCHING TELEVISION: NOT AT ALL
1. LITTLE INTEREST OR PLEASURE IN DOING THINGS: NOT AT ALL

## 2024-12-09 ASSESSMENT — LIFESTYLE VARIABLES
HOW OFTEN DO YOU HAVE A DRINK CONTAINING ALCOHOL: NEVER
HOW MANY STANDARD DRINKS CONTAINING ALCOHOL DO YOU HAVE ON A TYPICAL DAY: PATIENT DOES NOT DRINK

## 2024-12-09 NOTE — PROGRESS NOTES
Identified pt with two pt identifiers(name and )    Chief Complaint   Patient presents with    Medication Refill     Patient is here for med refill. No concerns. Would like flu.    Medicare AWV        Health Maintenance Due   Topic    Annual Wellness Visit (Medicare Advantage)     Flu vaccine (1)    COVID-19 Vaccine (3 - 2023-24 season)    Respiratory Syncytial Virus (RSV) Pregnant or age 60 yrs+ (1 - 1-dose 75+ series)    Lipids        Wt Readings from Last 3 Encounters:   24 70.3 kg (155 lb)   24 68.5 kg (151 lb)   23 68.5 kg (151 lb)     Temp Readings from Last 3 Encounters:   24 97.7 °F (36.5 °C) (Temporal)   23 98.4 °F (36.9 °C) (Temporal)     BP Readings from Last 3 Encounters:   24 138/70   24 120/78   23 138/64     Pulse Readings from Last 3 Encounters:   24 62   24 75   23 75           Depression Screening:  :         2024    10:20 AM 2024     8:21 AM 2023     1:30 PM 2022    10:00 AM 2022    10:36 AM   PHQ-9 Questionaire   Little interest or pleasure in doing things 0 0 0 0 0   Feeling down, depressed, or hopeless 0 0 0 0 0   Trouble falling or staying asleep, or sleeping too much 0 0      Feeling tired or having little energy 0 0      Poor appetite or overeating 0 0      Feeling bad about yourself - or that you are a failure or have let yourself or your family down 0 0      Trouble concentrating on things, such as reading the newspaper or watching television 0 0      Moving or speaking so slowly that other people could have noticed. Or the opposite - being so fidgety or restless that you have been moving around a lot more than usual 0 0      Thoughts that you would be better off dead, or of hurting yourself in some way 0 0      PHQ-9 Total Score 0 0 0 0 0   If you checked off any problems, how difficult have these problems made it for you to do your work, take care of things at home, or get along with other 
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   Seeing orthopedist.          Dentist Screen:  Have you seen the dentist within the past year?: (!) No    Intervention:  Advised to schedule with their dentist      Safety:  Do you have non-slip mats or non-slip surfaces or shower bars or grab bars in your shower or bathtub?: (!) No  Interventions:  See AVS for additional education material             Objective   Vitals:    12/09/24 1020   BP: 138/70   Site: Left Upper Arm   Position: Sitting   Cuff Size: Medium Adult   Pulse: 62   Resp: 20   Temp: 97.7 °F (36.5 °C)   TempSrc: Temporal   SpO2: 99%   Weight: 70.3 kg (155 lb)   Height: 1.778 m (5' 10\")      Body mass index is 22.24 kg/m².     Wt Readings from Last 3 Encounters:   12/09/24 70.3 kg (155 lb)   01/30/24 68.5 kg (151 lb)   12/28/23 68.5 kg (151 lb)      General: alert, cooperative, no distress   Mental  status: normal mood, behavior, speech, dress, motor activity, and thought processes, able to follow commands   HENT: NCAT   Neck: no visualized mass   Resp: no respiratory distress   Neuro: no gross deficits   Skin: no discoloration or lesions of concern on visible areas   Psychiatric: normal affect, consistent with stated mood, no evidence of hallucinations             No Known Allergies  Prior to Visit Medications    Medication Sig Taking? Authorizing Provider   tiZANidine (ZANAFLEX) 2 MG tablet 1-2 tablets, 1-2 hours prior to bedtime Yes Cindy Simon MD   mirtazapine (REMERON) 15 MG tablet Take 1 tablet by mouth nightly Yes Cindy Simon MD   mirtazapine (REMERON) 7.5 MG tablet Take 1 tablet by mouth nightly at bedtime. Yes Cindy Simon MD   EPINEPHrine (EPIPEN 2-TIM) 0.3 MG/0.3ML SOAJ injection Inject 0.3 mLs into the muscle once for 1 dose Use as directed for allergic reaction Yes Cindy Simon MD   dilTIAZem (TIAZAC) 120 MG extended release capsule Take 1 capsule by mouth daily Yes Provider,

## 2024-12-09 NOTE — PATIENT INSTRUCTIONS

## 2024-12-10 ENCOUNTER — LAB (OUTPATIENT)
Age: 74
End: 2024-12-10

## 2024-12-10 DIAGNOSIS — R53.81 MALAISE AND FATIGUE: ICD-10-CM

## 2024-12-10 DIAGNOSIS — R73.09 ELEVATED GLUCOSE: ICD-10-CM

## 2024-12-10 DIAGNOSIS — R53.83 MALAISE AND FATIGUE: ICD-10-CM

## 2024-12-10 DIAGNOSIS — E78.2 MIXED HYPERLIPIDEMIA: ICD-10-CM

## 2024-12-10 DIAGNOSIS — E55.9 VITAMIN D DEFICIENCY: ICD-10-CM

## 2024-12-12 ENCOUNTER — TELEPHONE (OUTPATIENT)
Age: 74
End: 2024-12-12

## 2024-12-12 LAB
25(OH)D3 SERPL-MCNC: 42.3 NG/ML (ref 30–100)
ALBUMIN SERPL-MCNC: 4 G/DL (ref 3.5–5)
ALBUMIN/GLOB SERPL: 1.5 (ref 1.1–2.2)
ALP SERPL-CCNC: 81 U/L (ref 45–117)
ALT SERPL-CCNC: 22 U/L (ref 12–78)
ANION GAP SERPL CALC-SCNC: 3 MMOL/L (ref 2–12)
AST SERPL-CCNC: 15 U/L (ref 15–37)
BASOPHILS # BLD: 0.1 K/UL (ref 0–0.1)
BASOPHILS NFR BLD: 1 % (ref 0–1)
BILIRUB SERPL-MCNC: 0.4 MG/DL (ref 0.2–1)
BUN SERPL-MCNC: 17 MG/DL (ref 6–20)
BUN/CREAT SERPL: 18 (ref 12–20)
CALCIUM SERPL-MCNC: 9.2 MG/DL (ref 8.5–10.1)
CHLORIDE SERPL-SCNC: 107 MMOL/L (ref 97–108)
CHOLEST SERPL-MCNC: 221 MG/DL
CO2 SERPL-SCNC: 30 MMOL/L (ref 21–32)
CREAT SERPL-MCNC: 0.97 MG/DL (ref 0.7–1.3)
DIFFERENTIAL METHOD BLD: ABNORMAL
EOSINOPHIL # BLD: 0.1 K/UL (ref 0–0.4)
EOSINOPHIL NFR BLD: 2 % (ref 0–7)
ERYTHROCYTE [DISTWIDTH] IN BLOOD BY AUTOMATED COUNT: 12 % (ref 11.5–14.5)
EST. AVERAGE GLUCOSE BLD GHB EST-MCNC: 105 MG/DL
GLOBULIN SER CALC-MCNC: 2.6 G/DL (ref 2–4)
GLUCOSE SERPL-MCNC: 106 MG/DL (ref 65–100)
HBA1C MFR BLD: 5.3 % (ref 4–5.6)
HCT VFR BLD AUTO: 45.5 % (ref 36.6–50.3)
HDLC SERPL-MCNC: 54 MG/DL
HDLC SERPL: 4.1 (ref 0–5)
HGB BLD-MCNC: 14.6 G/DL (ref 12.1–17)
IMM GRANULOCYTES # BLD AUTO: 0.1 K/UL (ref 0–0.04)
IMM GRANULOCYTES NFR BLD AUTO: 1 % (ref 0–0.5)
LDLC SERPL CALC-MCNC: 150.6 MG/DL (ref 0–100)
LYMPHOCYTES # BLD: 0.5 K/UL (ref 0.8–3.5)
LYMPHOCYTES NFR BLD: 9 % (ref 12–49)
MCH RBC QN AUTO: 31.1 PG (ref 26–34)
MCHC RBC AUTO-ENTMCNC: 32.1 G/DL (ref 30–36.5)
MCV RBC AUTO: 96.8 FL (ref 80–99)
MONOCYTES # BLD: 0.6 K/UL (ref 0–1)
MONOCYTES NFR BLD: 12 % (ref 5–13)
NEUTS SEG # BLD: 3.6 K/UL (ref 1.8–8)
NEUTS SEG NFR BLD: 75 % (ref 32–75)
NRBC # BLD: 0 K/UL (ref 0–0.01)
NRBC BLD-RTO: 0 PER 100 WBC
PLATELET # BLD AUTO: 241 K/UL (ref 150–400)
PMV BLD AUTO: 9.8 FL (ref 8.9–12.9)
POTASSIUM SERPL-SCNC: 4.1 MMOL/L (ref 3.5–5.1)
PROT SERPL-MCNC: 6.6 G/DL (ref 6.4–8.2)
RBC # BLD AUTO: 4.7 M/UL (ref 4.1–5.7)
RBC MORPH BLD: ABNORMAL
SODIUM SERPL-SCNC: 140 MMOL/L (ref 136–145)
T4 FREE SERPL-MCNC: 1 NG/DL (ref 0.8–1.5)
TRIGL SERPL-MCNC: 82 MG/DL
TSH SERPL DL<=0.05 MIU/L-ACNC: 2.7 UIU/ML (ref 0.36–3.74)
VLDLC SERPL CALC-MCNC: 16.4 MG/DL
WBC # BLD AUTO: 5 K/UL (ref 4.1–11.1)

## 2024-12-12 NOTE — TELEPHONE ENCOUNTER
----- Message from Dr. Cindy Simon MD sent at 12/12/2024 12:32 PM EST -----  Please let patient and wife know that his labs are stable.   Only concern is that his cholesterol levels are back up again.   He may want to check with his cardiologist about restarting cholesterol lowering medications.   The 10-year ASCVD risk score (Travis DK, et al., 2019) is: 27.9%  This is a very high risk for heart attack and stroke. Normal is <7.5%.   Other labs are stable.   Thanks you.

## 2025-06-22 DIAGNOSIS — F32.89 OTHER DEPRESSION: ICD-10-CM

## 2025-06-23 RX ORDER — MIRTAZAPINE 15 MG/1
15 TABLET, FILM COATED ORAL NIGHTLY
Qty: 90 TABLET | Refills: 0 | Status: SHIPPED | OUTPATIENT
Start: 2025-06-23

## 2025-08-06 ENCOUNTER — TELEPHONE (OUTPATIENT)
Age: 75
End: 2025-08-06

## (undated) DEVICE — BANDAGE COMPR 9 FTX4 IN SMOOTH COMFORTABLE SYNTH ESMRK LF

## (undated) DEVICE — STERILE POLYISOPRENE POWDER-FREE SURGICAL GLOVES: Brand: PROTEXIS

## (undated) DEVICE — ESOPHAGEAL/PYLORIC/COLONIC/BILIARY WIREGUIDED BALLOON DILATATION CATHETER: Brand: CRE™ PRO

## (undated) DEVICE — CUFF BLD PRSS AD CLTH SGL TB W/ BAYNT CONN ROUNDED CORNER

## (undated) DEVICE — CATH IV AUTOGRD BC PNK 20GA 25 -- INSYTE

## (undated) DEVICE — ELECTRODE,RADIOTRANSLUCENT,FOAM,3PK: Brand: MEDLINE

## (undated) DEVICE — POLYP TRAP: Brand: TRAPEASE®

## (undated) DEVICE — FORCEPS BX L240CM JAW DIA2.8MM L CAP W/ NDL MIC MESH TOOTH

## (undated) DEVICE — ADULT SPO2 SENSOR: Brand: NELLCOR

## (undated) DEVICE — BAG SPEC BIOHZRD 10 X 10 IN --

## (undated) DEVICE — SET GRAV CK VLV NEEDLESS ST 3 GANGED 4WAY STPCOCK HI FLO 10

## (undated) DEVICE — SYR ASSEMB INFL BLLN 60ML --

## (undated) DEVICE — STRETCH BANDAGE ROLL: Brand: DERMACEA

## (undated) DEVICE — NEEDLE HYPO 18GA L1.5IN PNK S STL HUB POLYPR SHLD REG BVL

## (undated) DEVICE — BITEBLOCK ENDOSCP 60FR MAXI WHT POLYETH STURDY W/ VELC WVN

## (undated) DEVICE — DRAPE,U/ SHT,SPLIT,PLAS,STERIL: Brand: MEDLINE

## (undated) DEVICE — SURGICAL PROCEDURE PACK BASIN MAJ SET CUST NO CAUT

## (undated) DEVICE — SUTURE MCRYL SZ 4-0 L27IN ABSRB UD L19MM PS-2 1/2 CIR PRIM Y426H

## (undated) DEVICE — BASIC PACK: Brand: CONVERTORS

## (undated) DEVICE — BAG BELONG PT PERS CLEAR HANDL

## (undated) DEVICE — KIT COLON W/ 1.1OZ LUB AND 2 END

## (undated) DEVICE — OCCLUSIVE GAUZE STRIP,3% BISMUTH TRIBROMOPHENATE IN PETROLATUM BLEND: Brand: XEROFORM

## (undated) DEVICE — STERILE POLYISOPRENE POWDER-FREE SURGICAL GLOVES WITH EMOLLIENT COATING: Brand: PROTEXIS

## (undated) DEVICE — CANNULA CUSH AD W/ 14FT TBG

## (undated) DEVICE — TOWEL SURG W17XL27IN STD BLU COT NONFENESTRATED PREWASHED

## (undated) DEVICE — MASTISOL ADHESIVE LIQ 2/3ML

## (undated) DEVICE — NEEDLE HYPO 25GA L1.5IN BVL ORIENTED ECLIPSE

## (undated) DEVICE — SOLIDIFIER MEDC 1200ML -- CONVERT TO 356117

## (undated) DEVICE — INTENDED FOR TISSUE SEPARATION, AND OTHER PROCEDURES THAT REQUIRE A SHARP SURGICAL BLADE TO PUNCTURE OR CUT.: Brand: BARD-PARKER ® CARBON RIB-BACK BLADES

## (undated) DEVICE — GAUZE SPONGES,12 PLY: Brand: CURITY

## (undated) DEVICE — CONTAINER SPEC 20 ML LID NEUT BUFF FORMALIN 10 % POLYPR STS

## (undated) DEVICE — SUTURE PROL SZ 2-0 L18IN NONABSORBABLE BLU FS L26MM 3/8 CIR 8685H

## (undated) DEVICE — Device

## (undated) DEVICE — ROCKER SWITCH PENCIL BLADE ELECTRODE, HOLSTER: Brand: EDGE

## (undated) DEVICE — SOLUTION IV 1000ML 0.9% SOD CHL

## (undated) DEVICE — SNARE ENDOSCP M L240CM W27MM SHTH DIA2.4MM CHN 2.8MM OVL

## (undated) DEVICE — DRAPE,EXTREMITY,89X128,STERILE: Brand: MEDLINE

## (undated) DEVICE — (D)SYR 10ML 1/5ML GRAD NSAF -- PKGING CHANGE USE ITEM 338027

## (undated) DEVICE — DEVON™ KNEE AND BODY STRAP 60" X 3" (1.5 M X 7.6 CM): Brand: DEVON

## (undated) DEVICE — BLADE SAW W5.5XL25MM THK0.38MM CUT THK0.43MM REPL S STL SAG

## (undated) DEVICE — ARGYLE FRAZIER SURGICAL SUCTION INSTRUMENT 10 FR/CH (3.3 MM): Brand: ARGYLE

## (undated) DEVICE — 1200 GUARD II KIT W/5MM TUBE W/O VAC TUBE: Brand: GUARDIAN

## (undated) DEVICE — SIMPLICITY FLUFF UNDERPAD 23X36, MODERATE: Brand: SIMPLICITY

## (undated) DEVICE — (D)STRIP SKN CLSR 0.5X4IN WHT --

## (undated) DEVICE — BLADE SAW OSC COARSE 25X9MM --

## (undated) DEVICE — (D)PREP SKN CHLRAPRP APPL 26ML -- CONVERT TO ITEM 371833